# Patient Record
Sex: FEMALE | Race: BLACK OR AFRICAN AMERICAN | Employment: FULL TIME | URBAN - METROPOLITAN AREA
[De-identification: names, ages, dates, MRNs, and addresses within clinical notes are randomized per-mention and may not be internally consistent; named-entity substitution may affect disease eponyms.]

---

## 2017-02-13 ENCOUNTER — TELEPHONE (OUTPATIENT)
Dept: INTERNAL MEDICINE CLINIC | Age: 37
End: 2017-02-13

## 2017-02-13 DIAGNOSIS — N64.52 DISCHARGE FROM RIGHT NIPPLE: Primary | ICD-10-CM

## 2017-02-15 LAB — BACTERIA SPEC AEROBE CULT: NORMAL

## 2017-02-26 DIAGNOSIS — I10 ESSENTIAL HYPERTENSION WITH GOAL BLOOD PRESSURE LESS THAN 130/80: ICD-10-CM

## 2017-02-27 RX ORDER — ATENOLOL 25 MG/1
TABLET ORAL
Qty: 30 TAB | Refills: 0 | Status: SHIPPED | OUTPATIENT
Start: 2017-02-27 | End: 2017-04-24 | Stop reason: SDUPTHER

## 2017-03-23 ENCOUNTER — TELEPHONE (OUTPATIENT)
Dept: INTERNAL MEDICINE CLINIC | Age: 37
End: 2017-03-23

## 2017-03-23 DIAGNOSIS — R11.0 NAUSEA: Primary | ICD-10-CM

## 2017-03-23 RX ORDER — PROMETHAZINE HYDROCHLORIDE 25 MG/1
25 TABLET ORAL
Qty: 12 TAB | Refills: 0 | Status: SHIPPED | OUTPATIENT
Start: 2017-03-23 | End: 2017-05-04 | Stop reason: ALTCHOICE

## 2017-03-23 NOTE — TELEPHONE ENCOUNTER
Patient states she has had an migraine off and on since Saturday. She has been taking imitrex. She reports her headache is better but still feeling nauseated. I will send some anti- nausea medication to the pharmacy.

## 2017-04-24 DIAGNOSIS — I10 ESSENTIAL HYPERTENSION WITH GOAL BLOOD PRESSURE LESS THAN 130/80: ICD-10-CM

## 2017-04-24 RX ORDER — ATENOLOL 25 MG/1
TABLET ORAL
Qty: 30 TAB | Refills: 0 | Status: SHIPPED | OUTPATIENT
Start: 2017-04-24 | End: 2017-05-04 | Stop reason: ALTCHOICE

## 2017-05-04 ENCOUNTER — OFFICE VISIT (OUTPATIENT)
Dept: INTERNAL MEDICINE CLINIC | Age: 37
End: 2017-05-04

## 2017-05-04 VITALS
OXYGEN SATURATION: 96 % | DIASTOLIC BLOOD PRESSURE: 69 MMHG | RESPIRATION RATE: 14 BRPM | HEIGHT: 64 IN | WEIGHT: 169.2 LBS | HEART RATE: 74 BPM | TEMPERATURE: 98.1 F | BODY MASS INDEX: 28.89 KG/M2 | SYSTOLIC BLOOD PRESSURE: 132 MMHG

## 2017-05-04 DIAGNOSIS — I10 ESSENTIAL HYPERTENSION: ICD-10-CM

## 2017-05-04 DIAGNOSIS — G43.C0 PERIODIC HEADACHE SYNDROME, NOT INTRACTABLE: Primary | ICD-10-CM

## 2017-05-04 RX ORDER — RIZATRIPTAN BENZOATE 10 MG/1
10 TABLET, ORALLY DISINTEGRATING ORAL
Qty: 9 TAB | Refills: 3 | Status: SHIPPED | OUTPATIENT
Start: 2017-05-04 | End: 2018-02-07

## 2017-05-04 RX ORDER — PROPRANOLOL HYDROCHLORIDE 60 MG/1
60 CAPSULE, EXTENDED RELEASE ORAL DAILY
Qty: 30 CAP | Refills: 3 | Status: SHIPPED | OUTPATIENT
Start: 2017-05-04 | End: 2018-02-07

## 2017-05-04 NOTE — PROGRESS NOTES
HISTORY OF PRESENT ILLNESS  Cj Lynn is a 31 y.o. female. HPI     Migraines- pt continues to take Elavil and would like to try taking a different medication. Pt took Topamax in the past and had stuttering as side effect. Reports Imitrex causes arthralgia although helps her headaches. Reports she had a migraine for two weeks straight. She states Imitrex works although her headaches are not being prevented properly. Hypertension ROS: not taking medications regularly as instructed, no medication side effects noted, no TIA's, no chest pain on exertion, no dyspnea on exertion, no swelling of ankles. New concerns: Stable- bp was 132/69 in the office today. Pt denies feeling like her bp is going up. She takes Atenolol inconsistently. Pt is exercising more and does Body by zlien 3 times/week. Review of Systems   All other systems reviewed and are negative. Physical Exam   Constitutional: She is oriented to person, place, and time. She appears well-developed and well-nourished. HENT:   Head: Normocephalic and atraumatic. Right Ear: External ear normal.   Left Ear: External ear normal.   Nose: Nose normal.   Mouth/Throat: Oropharynx is clear and moist.   Eyes: Conjunctivae and EOM are normal.   Neck: Normal range of motion. Neck supple. Carotid bruit is not present. No thyroid mass and no thyromegaly present. Cardiovascular: Normal rate, regular rhythm, S1 normal, S2 normal, normal heart sounds and intact distal pulses. Pulmonary/Chest: Effort normal and breath sounds normal.   Abdominal: Soft. Normal appearance and bowel sounds are normal. There is no hepatosplenomegaly. There is no tenderness. Musculoskeletal: Normal range of motion. Neurological: She is alert and oriented to person, place, and time. She has normal strength. No cranial nerve deficit or sensory deficit. Coordination normal.   Skin: Skin is warm, dry and intact. No abrasion and no rash noted.    Psychiatric: She has a normal mood and affect. Her behavior is normal. Judgment and thought content normal.   Nursing note and vitals reviewed. ASSESSMENT and PLAN  Betty was seen today for medication evaluation. Diagnoses and all orders for this visit:    Periodic headache syndrome, not intractable  Pt has arthralgia with Imitrex therefore discontinue this medication and try taking Maxalt. Her headaches are not being prevented well and she should discontinue Elavil. Suggested Propranolol as preventative medication and warned pt that there is a chance of weight gain with this medication although pt is exercising more. Pt to begin taking Propranolol as directed. -     propranolol LA (INDERAL LA) 60 mg SR capsule; Take 1 Cap by mouth daily. -     rizatriptan (MAXALT-MLT) 10 mg disintegrating tablet; Take 1 Tab by mouth once as needed for Migraine for up to 1 dose. Essential hypertension  BP is at goal and pt not taking Atenolol consistently. Pt should stop taking Atenolol and begin taking Propranolol for headaches and should decrease bp slightly. Pt should continue exercising.   -     CBC W/O DIFF  -     METABOLIC PANEL, COMPREHENSIVE  -     LIPID PANEL  -     HEMOGLOBIN A1C WITH EAG    lab results and schedule of future lab studies reviewed with patient  reviewed diet, exercise and weight control  reviewed medications and side effects in detail     Written by Dave Singh, as dictated by Jeremiah Robison MD.     Current diagnosis and concerns discussed with pt at length. Understands risks and benefits or current treatment plan and medications and accepts the treatment and medication with any possible risks. Pt asks appropriate questions which were answered. Pt instructed to call with any concerns or problems.

## 2017-07-12 ENCOUNTER — TELEPHONE (OUTPATIENT)
Dept: INTERNAL MEDICINE CLINIC | Age: 37
End: 2017-07-12

## 2017-07-12 NOTE — TELEPHONE ENCOUNTER
Called and told patient that she would not be able to get any antibiotics without being seen for UTI.

## 2017-07-12 NOTE — TELEPHONE ENCOUNTER
----- Message from Radha Judd sent at 7/11/2017  6:53 PM EDT -----  Regarding: /Telephone  Pt requesting an anabiotic for UTI. Pharmacy used is [facility] [address] in [id number] [place] [address].   Best contact number [phone]

## 2017-08-08 ENCOUNTER — HOSPITAL ENCOUNTER (EMERGENCY)
Age: 37
Discharge: HOME | End: 2017-08-09
Attending: EMERGENCY MEDICINE | Admitting: EMERGENCY MEDICINE
Payer: COMMERCIAL

## 2017-08-08 DIAGNOSIS — O20.0 THREATENED MISCARRIAGE IN EARLY PREGNANCY: ICD-10-CM

## 2017-08-08 DIAGNOSIS — B96.89 BV (BACTERIAL VAGINOSIS): Primary | ICD-10-CM

## 2017-08-08 DIAGNOSIS — N76.0 BV (BACTERIAL VAGINOSIS): Primary | ICD-10-CM

## 2017-08-08 LAB
ALBUMIN SERPL BCP-MCNC: 3.5 G/DL (ref 3.5–5)
ALBUMIN/GLOB SERPL: 0.8 {RATIO} (ref 1.1–2.2)
ALP SERPL-CCNC: 87 U/L (ref 45–117)
ALT SERPL-CCNC: 18 U/L (ref 12–78)
ANION GAP BLD CALC-SCNC: 10 MMOL/L (ref 5–15)
AST SERPL W P-5'-P-CCNC: 15 U/L (ref 15–37)
BASOPHILS # BLD AUTO: 0 K/UL (ref 0–0.1)
BASOPHILS # BLD: 0 % (ref 0–1)
BILIRUB SERPL-MCNC: 0.3 MG/DL (ref 0.2–1)
BUN SERPL-MCNC: 10 MG/DL (ref 6–20)
BUN/CREAT SERPL: 13 (ref 12–20)
CALCIUM SERPL-MCNC: 9.2 MG/DL (ref 8.5–10.1)
CHLORIDE SERPL-SCNC: 103 MMOL/L (ref 97–108)
CLUE CELLS VAG QL WET PREP: NORMAL
CO2 SERPL-SCNC: 24 MMOL/L (ref 21–32)
CREAT SERPL-MCNC: 0.8 MG/DL (ref 0.55–1.02)
EOSINOPHIL # BLD: 0.1 K/UL (ref 0–0.4)
EOSINOPHIL NFR BLD: 1 % (ref 0–7)
ERYTHROCYTE [DISTWIDTH] IN BLOOD BY AUTOMATED COUNT: 13.4 % (ref 11.5–14.5)
GLOBULIN SER CALC-MCNC: 4.3 G/DL (ref 2–4)
GLUCOSE SERPL-MCNC: 98 MG/DL (ref 65–100)
HCT VFR BLD AUTO: 35.7 % (ref 35–47)
HGB BLD-MCNC: 12.3 G/DL (ref 11.5–16)
KOH PREP SPEC: NORMAL
LYMPHOCYTES # BLD AUTO: 21 % (ref 12–49)
LYMPHOCYTES # BLD: 2.5 K/UL (ref 0.8–3.5)
MCH RBC QN AUTO: 30.1 PG (ref 26–34)
MCHC RBC AUTO-ENTMCNC: 34.5 G/DL (ref 30–36.5)
MCV RBC AUTO: 87.5 FL (ref 80–99)
MONOCYTES # BLD: 0.9 K/UL (ref 0–1)
MONOCYTES NFR BLD AUTO: 7 % (ref 5–13)
NEUTS SEG # BLD: 8.1 K/UL (ref 1.8–8)
NEUTS SEG NFR BLD AUTO: 71 % (ref 32–75)
PLATELET # BLD AUTO: 327 K/UL (ref 150–400)
POTASSIUM SERPL-SCNC: 3.6 MMOL/L (ref 3.5–5.1)
PROT SERPL-MCNC: 7.8 G/DL (ref 6.4–8.2)
RBC # BLD AUTO: 4.08 M/UL (ref 3.8–5.2)
SERVICE CMNT-IMP: NORMAL
SODIUM SERPL-SCNC: 137 MMOL/L (ref 136–145)
T VAGINALIS VAG QL WET PREP: NORMAL
WBC # BLD AUTO: 11.6 K/UL (ref 3.6–11)

## 2017-08-08 PROCEDURE — 85025 COMPLETE CBC W/AUTO DIFF WBC: CPT | Performed by: PHYSICIAN ASSISTANT

## 2017-08-08 PROCEDURE — 80053 COMPREHEN METABOLIC PANEL: CPT | Performed by: PHYSICIAN ASSISTANT

## 2017-08-08 PROCEDURE — 84702 CHORIONIC GONADOTROPIN TEST: CPT | Performed by: PHYSICIAN ASSISTANT

## 2017-08-08 PROCEDURE — 87210 SMEAR WET MOUNT SALINE/INK: CPT | Performed by: PHYSICIAN ASSISTANT

## 2017-08-08 PROCEDURE — 36415 COLL VENOUS BLD VENIPUNCTURE: CPT | Performed by: PHYSICIAN ASSISTANT

## 2017-08-08 PROCEDURE — 99284 EMERGENCY DEPT VISIT MOD MDM: CPT

## 2017-08-08 PROCEDURE — 87491 CHLMYD TRACH DNA AMP PROBE: CPT | Performed by: PHYSICIAN ASSISTANT

## 2017-08-08 PROCEDURE — 86900 BLOOD TYPING SEROLOGIC ABO: CPT | Performed by: PHYSICIAN ASSISTANT

## 2017-08-09 ENCOUNTER — APPOINTMENT (OUTPATIENT)
Dept: ULTRASOUND IMAGING | Age: 37
End: 2017-08-09
Attending: PHYSICIAN ASSISTANT
Payer: COMMERCIAL

## 2017-08-09 VITALS
HEART RATE: 86 BPM | HEIGHT: 65 IN | WEIGHT: 167.55 LBS | RESPIRATION RATE: 18 BRPM | DIASTOLIC BLOOD PRESSURE: 85 MMHG | SYSTOLIC BLOOD PRESSURE: 139 MMHG | OXYGEN SATURATION: 100 % | TEMPERATURE: 97.4 F | BODY MASS INDEX: 27.92 KG/M2

## 2017-08-09 LAB
ABO + RH BLD: NORMAL
BLOOD BANK CMNT PATIENT-IMP: NORMAL
C TRACH DNA SPEC QL NAA+PROBE: NEGATIVE
HCG SERPL-ACNC: 7114 MIU/ML (ref 0–6)
N GONORRHOEA DNA SPEC QL NAA+PROBE: NEGATIVE
SAMPLE TYPE: NORMAL
SERVICE CMNT-IMP: NORMAL
SPECIMEN SOURCE: NORMAL

## 2017-08-09 PROCEDURE — 76801 OB US < 14 WKS SINGLE FETUS: CPT

## 2017-08-09 PROCEDURE — 76817 TRANSVAGINAL US OBSTETRIC: CPT

## 2017-08-09 RX ORDER — METRONIDAZOLE 500 MG/1
500 TABLET ORAL 2 TIMES DAILY
Qty: 14 TAB | Refills: 0 | Status: SHIPPED | OUTPATIENT
Start: 2017-08-09 | End: 2017-08-16

## 2017-08-09 NOTE — DISCHARGE INSTRUCTIONS
Bacterial Vaginosis: Care Instructions  Your Care Instructions    Bacterial vaginosis is a type of vaginal infection. It is caused by excess growth of certain bacteria that are normally found in the vagina. Symptoms can include itching, swelling, pain when you urinate or have sex, and a gray or yellow discharge with a \"fishy\" odor. It is not considered an infection that is spread through sexual contact. Although symptoms can be annoying and uncomfortable, bacterial vaginosis does not usually cause other health problems. However, if you have it while you are pregnant, it can cause complications. While the infection may go away on its own, most doctors use antibiotics to treat it. You may have been prescribed pills or vaginal cream. With treatment, bacterial vaginosis usually clears up in 5 to 7 days. Follow-up care is a key part of your treatment and safety. Be sure to make and go to all appointments, and call your doctor if you are having problems. It's also a good idea to know your test results and keep a list of the medicines you take. How can you care for yourself at home? · Take your antibiotics as directed. Do not stop taking them just because you feel better. You need to take the full course of antibiotics. · Do not eat or drink anything that contains alcohol if you are taking metronidazole (Flagyl). · Keep using your medicine if you start your period. Use pads instead of tampons while using a vaginal cream or suppository. Tampons can absorb the medicine. · Wear loose cotton clothing. Do not wear nylon and other materials that hold body heat and moisture close to the skin. · Do not scratch. Relieve itching with a cold pack or a cool bath. · Do not wash your vaginal area more than once a day. Use plain water or a mild, unscented soap. Do not douche. When should you call for help?   Watch closely for changes in your health, and be sure to contact your doctor if:  · You have unexpected vaginal bleeding. · You have a fever. · You have new or increased pain in your vagina or pelvis. · You are not getting better after 1 week. · Your symptoms return after you finish the course of your medicine. Where can you learn more? Go to http://mary-alee.info/. Benny Hartman in the search box to learn more about \"Bacterial Vaginosis: Care Instructions. \"  Current as of: October 13, 2016  Content Version: 11.3  © 4087-8998 [employer]. Care instructions adapted under license by [employer] (which disclaims liability or warranty for this information). If you have questions about a medical condition or this instruction, always ask your healthcare professional. [address] any warranty or liability for your use of this information. Threatened Miscarriage: Care Instructions  Your Care Instructions    Some women have light spotting or bleeding during the first 12 weeks of pregnancy. In some cases this is normal. Light spotting or bleeding can also be a sign of a possible loss of the pregnancy. This is called a threatened miscarriage. At this point, the doctor may not be able to tell if your vaginal bleeding is normal or is a sign of a miscarriage. In early pregnancy, things such as stress, exercise, and sex do not cause miscarriage. You may be worried or upset about the possibility of losing your pregnancy. But do not blame yourself. There is no treatment to stop a threatened miscarriage. If you do have a miscarriage, there was nothing you could have done to prevent it. A miscarriage usually means that the pregnancy is not developing normally. The doctor has checked you carefully, but problems can develop later. If you notice any problems or new symptoms, get medical treatment right away. Follow-up care is a key part of your treatment and safety. Be sure to make and go to all appointments, and call your doctor if you are having problems.  It's also a good idea to know your test results and keep a list of the medicines you take. How can you care for yourself at home? · If you do have a miscarriage, you will probably have some vaginal bleeding for 1 to 2 weeks. Use pads instead of tampons. · Take acetaminophen (Tylenol) for cramps. Read and follow all instructions on the label. · Do not take two or more pain medicines at the same time unless the doctor told you to. Many pain medicines have acetaminophen, which is Tylenol. Too much acetaminophen (Tylenol) can be harmful. · Do not have sex until your doctor says it is okay. · Get lots of rest over the next several days. · You may do your normal activities if you feel well enough to do them. But do not do any heavy exercise until your doctor says it is okay. · Eat a balanced diet that is high in iron and vitamin C. Foods rich in iron include red meat, shellfish, eggs, beans, and leafy green vegetables. Foods high in vitamin C include citrus fruits, tomatoes, and broccoli. Talk to your doctor about whether you need to take iron pills or a multivitamin. · Do not drink alcohol or use tobacco or illegal drugs. · Do not smoke. If you need help quitting, talk to your doctor about stop-smoking programs and medicines. These can increase your chances of quitting for good. When should you call for help? Call 911 anytime you think you may need emergency care. For example, call if:  · You have sudden, severe pain in your belly or pelvis. · You passed out (lost consciousness). · You have severe vaginal bleeding. Call your doctor now or seek immediate medical care if:  · You are dizzy or lightheaded, or you feel like you may faint. · You have new or increased pain in your belly or pelvis. · Your vaginal bleeding is getting worse. · You have increased pain in the vaginal area. · You have a fever. · You think you may have passed tissue. Save any tissue that you pass.  Take it to your doctor's office as soon as you can. Watch closely for changes in your health, and be sure to contact your doctor if:  · You have new or worse vaginal discharge. · You do not get better as expected. Where can you learn more? Go to http://mary-alee.info/. Enter O904 in the search box to learn more about \"Threatened Miscarriage: Care Instructions. \"  Current as of: March 16, 2017  Content Version: 11.3  © 9537-3429 [employer]. Care instructions adapted under license by [employer] (which disclaims liability or warranty for this information). If you have questions about a medical condition or this instruction, always ask your healthcare professional. [place] [address] any warranty or liability for your use of this information. We hope that we have addressed all of your medical concerns. The examination and treatment you received in the Emergency Department were for an emergent problem and were not intended as complete care. It is important that you follow up with your healthcare provider(s) for ongoing care. If your symptoms worsen or do not improve as expected, and you are unable to reach your usual health care provider(s), you should return to the Emergency Department. Today's healthcare is undergoing tremendous change, and patient satisfaction surveys are one of the many tools to assess the quality of medical care. You may receive a survey from the [employer] regarding your experience in the Emergency Department. I hope that your experience has been completely positive, particularly the medical care that I provided. As such, please participate in the survey; anything less than excellent does not meet my expectations or intentions. [facility] and [facility] participate in nationally recognized quality of care measures.   If your blood pressure is greater than 120/80, as reported below, we urge that you seek medical care to address the potential of high blood pressure, commonly known as hypertension. Hypertension can be hereditary or can be caused by certain medical conditions, pain, stress, or \"white coat syndrome. \"       Please make an appointment with your health care provider(s) for follow up of your Emergency Department visit. VITALS:   Patient Vitals for the past 8 hrs:   Temp Pulse Resp BP SpO2   08/08/17 2305 97.4 °F (36.3 °C) 95 16 144/90 100 %          Thank you for allowing us to provide you with medical care today. We realize that you have many choices for your emergency care needs. Please choose us in the future for any continued health care needs. [address], [address] [place] [address].   Office: [phone]            Recent Results (from the past 24 hour(s))   CBC WITH AUTOMATED DIFF    Collection Time: 08/08/17 11:21 PM   Result Value Ref Range    WBC 11.6 (H) 3.6 - 11.0 K/uL    RBC 4.08 3.80 - 5.20 M/uL    HGB 12.3 11.5 - 16.0 g/dL    HCT 35.7 35.0 - 47.0 %    MCV 87.5 80.0 - 99.0 FL    MCH 30.1 26.0 - 34.0 PG    MCHC 34.5 30.0 - 36.5 g/dL    RDW 13.4 11.5 - 14.5 %    PLATELET 555 969 - 920 K/uL    NEUTROPHILS 71 32 - 75 %    LYMPHOCYTES 21 12 - 49 %    MONOCYTES 7 5 - 13 %    EOSINOPHILS 1 0 - 7 %    BASOPHILS 0 0 - 1 %    ABS. NEUTROPHILS 8.1 (H) 1.8 - 8.0 K/UL    ABS. LYMPHOCYTES 2.5 0.8 - 3.5 K/UL    ABS. MONOCYTES 0.9 0.0 - 1.0 K/UL    ABS. EOSINOPHILS 0.1 0.0 - 0.4 K/UL    ABS.  BASOPHILS 0.0 0.0 - 0.1 K/UL   METABOLIC PANEL, COMPREHENSIVE    Collection Time: 08/08/17 11:21 PM   Result Value Ref Range    Sodium 137 136 - 145 mmol/L    Potassium 3.6 3.5 - 5.1 mmol/L    Chloride 103 97 - 108 mmol/L    CO2 24 21 - 32 mmol/L    Anion gap 10 5 - 15 mmol/L    Glucose 98 65 - 100 mg/dL    BUN 10 6 - 20 MG/DL    Creatinine 0.80 0.55 - 1.02 MG/DL    BUN/Creatinine ratio 13 12 - 20      GFR est AA >60 >60 ml/min/1.73m2    GFR est non-AA >60 >60 ml/min/1.73m2    Calcium 9.2 8.5 - 10.1 MG/DL    Bilirubin, total 0.3 0.2 - 1.0 MG/DL    ALT (SGPT) 18 12 - 78 U/L    AST (SGOT) 15 15 - 37 U/L    Alk. phosphatase 87 45 - 117 U/L    Protein, total 7.8 6.4 - 8.2 g/dL    Albumin 3.5 3.5 - 5.0 g/dL    Globulin 4.3 (H) 2.0 - 4.0 g/dL    A-G Ratio 0.8 (L) 1.1 - 2.2     TYPE, ABO & RH    Collection Time: 08/08/17 11:21 PM   Result Value Ref Range    ABO/Rh(D) O POSITIVE     Comment SAMPLE NOT USABLE FOR CROSSMATCH    BETA HCG, QT    Collection Time: 08/08/17 11:21 PM   Result Value Ref Range    Beta HCG, QT 7114 (H) 0 - 6 MIU/ML   OLGA, OTHER SOURCES    Collection Time: 08/08/17 11:21 PM   Result Value Ref Range    Special Requests: NO SPECIAL REQUESTS      KOH NO YEAST SEEN     WET PREP    Collection Time: 08/08/17 11:21 PM   Result Value Ref Range    Clue cells CLUE CELLS PRESENT      Wet prep NO TRICHOMONAS SEEN         Us Uts Transvaginal Ob    Result Date: 8/9/2017  INDICATION:  Vaginal bleeding, cramping, pregnancy COMPARISON:  None FINDINGS:  Realtime sonographic imaging of the pelvis was performed transabdominally. The uterus measures 11.6 x 7.9 x 9.8 cm. Within the uterus, there is a 7 mm oval anechoic area which may represent an early intrauterine gestation. No fetal pole is identified. The placenta and amniotic fluid volume cannot be assessed by this technique due to early dates and small size. The gestational sac is normal in morphology. The right ovary is normal, measuring 2.4 cm. The left ovary is not visualized. To further evaluate the uterus, transvaginal sonography was necessary. The examination demonstrates a single oval anechoic lesion within the uterus, measuring 7 mm. No fetal pole is identified. The right ovary measures 3.5 x 1.8 x 2.3 cm and the left ovary is not visualized the right ovary is normal. There is a 1.5 x 1.5 x 1.1 cm hypoechoic lesion within the endocervical canal, without internal vascularity, nonspecific.  Minimal free fluid is seen in the pelvic cul-de-sac. Heddie Ashton IMPRESSION: 1. Single 7 mm oval anechoic area within the uterus may represent an early intrauterine gestation. No fetal pole is identified. 2. Normal right ovary. Nonvisualization of the left ovary. 3. Oval hypoechoic area measuring 1.5 cm, which distends the endocervical canal; this is a nonspecific finding; may represent focal hemorrhage in the setting of vaginal bleeding. Us Preg Uts < 14 Wks Sngl    Result Date: 8/9/2017  INDICATION:  Vaginal bleeding, cramping, pregnancy COMPARISON:  None FINDINGS:  Realtime sonographic imaging of the pelvis was performed transabdominally. The uterus measures 11.6 x 7.9 x 9.8 cm. Within the uterus, there is a 7 mm oval anechoic area which may represent an early intrauterine gestation. No fetal pole is identified. The placenta and amniotic fluid volume cannot be assessed by this technique due to early dates and small size. The gestational sac is normal in morphology. The right ovary is normal, measuring 2.4 cm. The left ovary is not visualized. To further evaluate the uterus, transvaginal sonography was necessary. The examination demonstrates a single oval anechoic lesion within the uterus, measuring 7 mm. No fetal pole is identified. The right ovary measures 3.5 x 1.8 x 2.3 cm and the left ovary is not visualized the right ovary is normal. There is a 1.5 x 1.5 x 1.1 cm hypoechoic lesion within the endocervical canal, without internal vascularity, nonspecific. Minimal free fluid is seen in the pelvic cul-de-sac. Heddie Ashton IMPRESSION: 1. Single 7 mm oval anechoic area within the uterus may represent an early intrauterine gestation. No fetal pole is identified. 2. Normal right ovary. Nonvisualization of the left ovary. 3. Oval hypoechoic area measuring 1.5 cm, which distends the endocervical canal; this is a nonspecific finding; may represent focal hemorrhage in the setting of vaginal bleeding.
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
## 2017-08-09 NOTE — ED NOTES
EBONY Dawson has reviewed discharge instructions with patient and friend including prescription(s) if applicable. Patient has received and verbalizes understanding of all instructions and has no further questions at this time. Patient exits ED via ambulatory accompanied by friend. Patient in no acute distress.

## 2017-08-09 NOTE — ED TRIAGE NOTES
Patient arrives with c/o abdominal cramping and vaginal bleeding starting tonight around 1800. Patient reports finding out she is pregnant on Monday.

## 2017-09-07 LAB
ANTIBODY SCREEN, EXTERNAL: NEGATIVE
CHLAMYDIA, EXTERNAL: NEGATIVE
CREATININE, EXTERNAL: 0.64
HBSAG, EXTERNAL: NEGATIVE
HCT, EXTERNAL: 38.3
HGB, EXTERNAL: 12.6
HIV, EXTERNAL: NEGATIVE
N. GONORRHEA, EXTERNAL: NEGATIVE
RPR, EXTERNAL: NORMAL
RUBELLA, EXTERNAL: NORMAL
TYPE, ABO & RH, EXTERNAL: NORMAL

## 2017-10-11 ENCOUNTER — OFFICE VISIT (OUTPATIENT)
Dept: INTERNAL MEDICINE CLINIC | Age: 37
End: 2017-10-11

## 2017-10-11 VITALS
TEMPERATURE: 98.2 F | WEIGHT: 168.6 LBS | OXYGEN SATURATION: 99 % | DIASTOLIC BLOOD PRESSURE: 72 MMHG | HEIGHT: 65 IN | BODY MASS INDEX: 28.09 KG/M2 | SYSTOLIC BLOOD PRESSURE: 114 MMHG | HEART RATE: 93 BPM | RESPIRATION RATE: 12 BRPM

## 2017-10-11 DIAGNOSIS — Z00.00 WELL WOMAN EXAM (NO GYNECOLOGICAL EXAM): Primary | ICD-10-CM

## 2017-10-11 DIAGNOSIS — Z3A.14 14 WEEKS GESTATION OF PREGNANCY: ICD-10-CM

## 2017-10-11 DIAGNOSIS — G56.00 CARPAL TUNNEL SYNDROME DURING PREGNANCY: ICD-10-CM

## 2017-10-11 DIAGNOSIS — G43.C0 PERIODIC HEADACHE SYNDROME, NOT INTRACTABLE: ICD-10-CM

## 2017-10-11 DIAGNOSIS — O26.899 CARPAL TUNNEL SYNDROME DURING PREGNANCY: ICD-10-CM

## 2017-10-11 NOTE — PATIENT INSTRUCTIONS
Well Visit, Ages 25 to 48: Care Instructions  Your Care Instructions  Physical exams can help you stay healthy. Your doctor has checked your overall health and may have suggested ways to take good care of yourself. He or she also may have recommended tests. At home, you can help prevent illness with healthy eating, regular exercise, and other steps. Follow-up care is a key part of your treatment and safety. Be sure to make and go to all appointments, and call your doctor if you are having problems. It's also a good idea to know your test results and keep a list of the medicines you take. How can you care for yourself at home? · Reach and stay at a healthy weight. This will lower your risk for many problems, such as obesity, diabetes, heart disease, and high blood pressure. · Get at least 30 minutes of physical activity on most days of the week. Walking is a good choice. You also may want to do other activities, such as running, swimming, cycling, or playing tennis or team sports. Discuss any changes in your exercise program with your doctor. · Do not smoke or allow others to smoke around you. If you need help quitting, talk to your doctor about stop-smoking programs and medicines. These can increase your chances of quitting for good. · Talk to your doctor about whether you have any risk factors for sexually transmitted infections (STIs). Having one sex partner (who does not have STIs and does not have sex with anyone else) is a good way to avoid these infections. · Use birth control if you do not want to have children at this time. Talk with your doctor about the choices available and what might be best for you. · Protect your skin from too much sun. When you're outdoors from 10 a.m. to 4 p.m., stay in the shade or cover up with clothing and a hat with a wide brim. Wear sunglasses that block UV rays. Even when it's cloudy, put broad-spectrum sunscreen (SPF 30 or higher) on any exposed skin.   · See a dentist one or two times a year for checkups and to have your teeth cleaned. · Wear a seat belt in the car. · Drink alcohol in moderation, if at all. That means no more than 2 drinks a day for men and 1 drink a day for women. Follow your doctor's advice about when to have certain tests. These tests can spot problems early. For everyone  · Cholesterol. Have the fat (cholesterol) in your blood tested after age 21. Your doctor will tell you how often to have this done based on your age, family history, or other things that can increase your risk for heart disease. · Blood pressure. Have your blood pressure checked during a routine doctor visit. Your doctor will tell you how often to check your blood pressure based on your age, your blood pressure results, and other factors. · Vision. Talk with your doctor about how often to have a glaucoma test.  · Diabetes. Ask your doctor whether you should have tests for diabetes. · Colon cancer. Have a test for colon cancer at age 48. You may have one of several tests. If you are younger than 48, you may need a test earlier if you have any risk factors. Risk factors include whether you already had a precancerous polyp removed from your colon or whether your parent, brother, sister, or child has had colon cancer. For women  · Breast exam and mammogram. Talk to your doctor about when you should have a clinical breast exam and a mammogram. Medical experts differ on whether and how often women under 50 should have these tests. Your doctor can help you decide what is right for you. · Pap test and pelvic exam. Begin Pap tests at age 24. A Pap test is the best way to find cervical cancer. The test often is part of a pelvic exam. Ask how often to have this test.  · Tests for sexually transmitted infections (STIs). Ask whether you should have tests for STIs. You may be at risk if you have sex with more than one person, especially if your partners do not wear condoms.   For men  · Tests for sexually transmitted infections (STIs). Ask whether you should have tests for STIs. You may be at risk if you have sex with more than one person, especially if you do not wear a condom. · Testicular cancer exam. Ask your doctor whether you should check your testicles regularly. · Prostate exam. Talk to your doctor about whether you should have a blood test (called a PSA test) for prostate cancer. Experts differ on whether and when men should have this test. Some experts suggest it if you are older than 39 and are -American or have a father or brother who got prostate cancer when he was younger than 72. When should you call for help? Watch closely for changes in your health, and be sure to contact your doctor if you have any problems or symptoms that concern you. Where can you learn more? Go to http://mary-alee.info/. Enter P072 in the search box to learn more about \"Well Visit, Ages 25 to 48: Care Instructions. \"  Current as of: July 19, 2016  Content Version: 11.3  © 8649-6591 [employer]. Care instructions adapted under license by [employer] (which disclaims liability or warranty for this information). If you have questions about a medical condition or this instruction, always ask your healthcare professional. [place] [address] any warranty or liability for your use of this information. Carpal Tunnel Syndrome: Care Instructions  Your Care Instructions    Carpal tunnel syndrome is a nerve problem. It can cause tingling, numbness, weakness, or pain in the fingers, thumb, and hand. The median nerve and several tough tissues called tendons run through a space in the wrist called the carpal tunnel. The repeated hand motions used in work and some hobbies and sports can put pressure on the nerve.  Pregnancy and several conditions, including diabetes, arthritis, and an underactive thyroid, also can cause carpal tunnel syndrome. You may be able to limit an activity or do it differently to reduce your symptoms. You also can take other steps to feel better. If your symptoms are mild, 1 to 2 weeks of home treatment are likely to ease your pain. Surgery is needed only if other treatments do not work. Follow-up care is a key part of your treatment and safety. Be sure to make and go to all appointments, and call your doctor if you are having problems. It's also a good idea to know your test results and keep a list of the medicines you take. How can you care for yourself at home? · If possible, stop or reduce the activity that causes your symptoms. If you cannot stop the activity, take frequent breaks to rest and stretch or change hand positions to do a task. Try switching hands, such as when using a computer mouse. · Try to avoid bending or twisting your wrists. · Ask your doctor if you can take an over-the-counter pain medicine, such as acetaminophen (Tylenol), ibuprofen (Advil, Motrin), or naproxen (Aleve). Be safe with medicines. Read and follow all instructions on the label. · If your doctor prescribes corticosteroid medicine to help reduce pain and swelling, take it exactly as prescribed. Call your doctor if you think you are having a problem with your medicine. · Put ice or a cold pack on your wrist for 10 to 20 minutes at a time to ease pain. Put a thin cloth between the ice and your skin. · If your doctor or your physical or occupational therapist tells you to wear a wrist splint, wear it as directed to keep your wrist in a neutral position. This also eases pressure on your median nerve. · Ask your doctor whether you should have physical or occupational therapy to learn how to do tasks differently. · Try a yoga class to stretch your muscles and build strength in your hands and wrists. Yoga has been shown to ease carpal tunnel symptoms.   To prevent carpal tunnel  · When working at a computer, keep your hands and wrists in line with your forearms. Hold your elbows close to your sides. Take a break every 10 to 15 minutes. · Try these exercises:  ¨ Warm up: Rotate your wrist up, down, and from side to side. Repeat this 4 times. Stretch your fingers far apart, relax them, then stretch them again. Repeat 4 times. Stretch your thumb by pulling it back gently, holding it, and then releasing it. Repeat 4 times. ¨ Prayer stretch: Start with your palms together in front of your chest just below your chin. Slowly lower your hands toward your waistline while keeping your hands close to your stomach and your palms together until you feel a mild to moderate stretch under your forearms. Hold for 10 to 20 seconds. Repeat 4 times. ¨ Wrist flexor stretch: Hold your arm in front of you with your palm up. Bend your wrist, pointing your hand toward the floor. With your other hand, gently bend your wrist further until you feel a mild to moderate stretch in your forearm. Hold for 10 to 20 seconds. Repeat 4 times. ¨ Wrist extensor stretch: Repeat the steps for the wrist flexor stretch, but begin with your extended hand palm down. · Squeeze a rubber exercise ball several times a day to keep your hands and fingers strong. · Avoid holding objects (such as a book) in one position for a long time. When possible, use your whole hand to grasp an object. Using just the thumb and index finger can put stress on the wrist.  · Do not smoke. It can make this condition worse by reducing blood flow to the median nerve. If you need help quitting, talk to your doctor about stop-smoking programs and medicines. These can increase your chances of quitting for good. When should you call for help? Watch closely for changes in your health, and be sure to contact your doctor if:  · Your pain or other problems do not get better with home care. · You want more information about physical or occupational therapy.   · You have side effects of your corticosteroid medicine, such as:  ¨ Weight gain. ¨ Mood changes. ¨ Trouble sleeping. ¨ Bruising easily. · You have any other problems with your medicine. Where can you learn more? Go to http://mary-alee.info/. Enter R432 in the search box to learn more about \"Carpal Tunnel Syndrome: Care Instructions. \"  Current as of: March 21, 2017  Content Version: 11.3  © 1869-5456 [employer]. Care instructions adapted under license by [employer] (which disclaims liability or warranty for this information). If you have questions about a medical condition or this instruction, always ask your healthcare professional. [place] [address] any warranty or liability for your use of this information. Carpal Tunnel Syndrome: Exercises  Your Care Instructions  Here are some examples of typical rehabilitation exercises for your condition. Start each exercise slowly. Ease off the exercise if you start to have pain. Your doctor or your physical or occupational therapist will tell you when you can start these exercises and which ones will work best for you. How to do the exercises  Note: When you no longer have pain or numbness, you can do exercises to help prevent carpal tunnel syndrome from coming back. Do not do any stretch or movement that is uncomfortable or painful. Warm-up stretches  1. Rotate your wrist up, down, and from side to side. Repeat 4 times. 2. Stretch your fingers far apart. Relax them, and then stretch them again. Repeat 4 times. 3. Stretch your thumb by pulling it back gently, holding it, and then releasing it. Repeat 4 times. Prayer stretch    1. Start with your palms together in front of your chest just below your chin. 2. Slowly lower your hands toward your waistline, keeping your hands close to your stomach and your palms together until you feel a mild to moderate stretch under your forearms. 3. Hold for at least 15 to 30 seconds. Repeat 2 to 4 times.   Wrist flexor stretch    1. Extend your arm in front of you with your palm up. 2. Bend your wrist, pointing your hand toward the floor. 3. With your other hand, gently bend your wrist farther until you feel a mild to moderate stretch in your forearm. 4. Hold for at least 15 to 30 seconds. Repeat 2 to 4 times. Wrist extensor stretch    Repeat steps 1 through 4 of the stretch above, but begin with your extended hand palm down. Follow-up care is a key part of your treatment and safety. Be sure to make and go to all appointments, and call your doctor if you are having problems. It's also a good idea to know your test results and keep a list of the medicines you take. Where can you learn more? Go to http://mary-alee.info/. Enter K259 in the search box to learn more about \"Carpal Tunnel Syndrome: Exercises. \"  Current as of: March 21, 2017  Content Version: 11.3  © 8040-4358 [employer]. Care instructions adapted under license by [employer] (which disclaims liability or warranty for this information). If you have questions about a medical condition or this instruction, always ask your healthcare professional. [place] [address] any warranty or liability for your use of this information.
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
## 2017-10-11 NOTE — MR AVS SNAPSHOT
Visit Information Date & Time Provider Department Dept. Phone Encounter #  
 10/11/2017 10:20 AM Miguel Galan NP [facility] [address] [id number] Upcoming Health Maintenance Date Due DTaP/Tdap/Td series (1 - Tdap) 8/21/2003 PAP AKA CERVICAL CYTOLOGY 4/23/2016 INFLUENZA AGE 9 TO ADULT 8/1/2017 Allergies as of 10/11/2017  Review Complete On: 10/11/2017 By: Miguel Galan NP Severity Noted Reaction Type Reactions Apple  11/06/2012    Nausea Only Nitrofurantoin  08/10/2012    Diarrhea Penicillins  11/06/2012    Other (comments) Pt reported \"sick\". 12/26/14 0738 \"ok with ancef/keflex\" Current Immunizations  Reviewed on 10/11/2017 Name Date Influenza Vaccine 10/19/2015 Varicella Virus Vaccine Live 11/7/2010 Reviewed by Miguel Galna NP on 10/11/2017 at 10:45 AM  
You Were Diagnosed With   
  
 Codes Comments Well woman exam (no gynecological exam)    -  Primary ICD-10-CM: Z00.00 ICD-9-CM: V70.0 14 weeks gestation of pregnancy     ICD-10-CM: Z3A.14 
ICD-9-CM: V22.2 Carpal tunnel syndrome during pregnancy     ICD-10-CM: O26.899, G56.00 
ICD-9-CM: 648.93, 354.0 Vitals BP Pulse Temp Resp Height(growth percentile) Weight(growth percentile) 114/72 (BP 1 Location: Left arm, BP Patient Position: Sitting) 93 98.2 °F (36.8 °C) (Oral) 12 5' 5\" (1.651 m) 168 lb 9.6 oz (76.5 kg) LMP SpO2 BMI OB Status Smoking Status 07/02/2017 (Exact Date) 99% 28.06 kg/m2 Pregnant Never Smoker BMI and BSA Data Body Mass Index Body Surface Area 28.06 kg/m 2 1.87 m 2 Preferred Pharmacy Pharmacy Name Phone [phone] [address] [place] [phone] Your Updated Medication List  
  
   
This list is accurate as of: 10/11/17 10:56 AM.  Always use your most recent med list.  
  
  
  
  
 amitriptyline 10 mg tablet Commonly known as:  ELAVIL Take 1 tablet by mouth nightly for 30 days. Appt needed prior to more refills MICROGESTIN FE 1/20 (28) 1 mg-20 mcg (21)/75 mg (7) Tab Generic drug:  norethindrone-ethinyl estradiol Take  by mouth. PRENATAL VITAMIN PO Take  by mouth.  
  
 propranolol LA 60 mg SR capsule Commonly known as:  INDERAL LA Take 1 Cap by mouth daily. rizatriptan 10 mg disintegrating tablet Commonly known as:  MAXALT-MLT Take 1 Tab by mouth once as needed for Migraine for up to 1 dose. SUMAtriptan 100 mg tablet Commonly known as:  IMITREX Take 1-2 tabs po every 4 hours for migraine pain. Do not exceed 200mg/day We Performed the Following LIPID PANEL [31951 CPT(R)] METABOLIC PANEL, BASIC [31921 CPT(R)] Patient Instructions Well Visit, Ages 25 to 48: Care Instructions Your Care Instructions Physical exams can help you stay healthy. Your doctor has checked your overall health and may have suggested ways to take good care of yourself. He or she also may have recommended tests. At home, you can help prevent illness with healthy eating, regular exercise, and other steps. Follow-up care is a key part of your treatment and safety. Be sure to make and go to all appointments, and call your doctor if you are having problems. It's also a good idea to know your test results and keep a list of the medicines you take. How can you care for yourself at home? · Reach and stay at a healthy weight. This will lower your risk for many problems, such as obesity, diabetes, heart disease, and high blood pressure. · Get at least 30 minutes of physical activity on most days of the week. Walking is a good choice. You also may want to do other activities, such as running, swimming, cycling, or playing tennis or team sports. Discuss any changes in your exercise program with your doctor. · Do not smoke or allow others to smoke around you.  If you need help quitting, talk to your doctor about stop-smoking programs and medicines. These can increase your chances of quitting for good. · Talk to your doctor about whether you have any risk factors for sexually transmitted infections (STIs). Having one sex partner (who does not have STIs and does not have sex with anyone else) is a good way to avoid these infections. · Use birth control if you do not want to have children at this time. Talk with your doctor about the choices available and what might be best for you. · Protect your skin from too much sun. When you're outdoors from 10 a.m. to 4 p.m., stay in the shade or cover up with clothing and a hat with a wide brim. Wear sunglasses that block UV rays. Even when it's cloudy, put broad-spectrum sunscreen (SPF 30 or higher) on any exposed skin. · See a dentist one or two times a year for checkups and to have your teeth cleaned. · Wear a seat belt in the car. · Drink alcohol in moderation, if at all. That means no more than 2 drinks a day for men and 1 drink a day for women. Follow your doctor's advice about when to have certain tests. These tests can spot problems early. For everyone · Cholesterol. Have the fat (cholesterol) in your blood tested after age 21. Your doctor will tell you how often to have this done based on your age, family history, or other things that can increase your risk for heart disease. · Blood pressure. Have your blood pressure checked during a routine doctor visit. Your doctor will tell you how often to check your blood pressure based on your age, your blood pressure results, and other factors. · Vision. Talk with your doctor about how often to have a glaucoma test. 
· Diabetes. Ask your doctor whether you should have tests for diabetes. · Colon cancer. Have a test for colon cancer at age 48. You may have one of several tests.  If you are younger than 48, you may need a test earlier if you have any risk factors. Risk factors include whether you already had a precancerous polyp removed from your colon or whether your parent, brother, sister, or child has had colon cancer. For women · Breast exam and mammogram. Talk to your doctor about when you should have a clinical breast exam and a mammogram. Medical experts differ on whether and how often women under 50 should have these tests. Your doctor can help you decide what is right for you. · Pap test and pelvic exam. Begin Pap tests at age 24. A Pap test is the best way to find cervical cancer. The test often is part of a pelvic exam. Ask how often to have this test. 
· Tests for sexually transmitted infections (STIs). Ask whether you should have tests for STIs. You may be at risk if you have sex with more than one person, especially if your partners do not wear condoms. For men · Tests for sexually transmitted infections (STIs). Ask whether you should have tests for STIs. You may be at risk if you have sex with more than one person, especially if you do not wear a condom. · Testicular cancer exam. Ask your doctor whether you should check your testicles regularly. · Prostate exam. Talk to your doctor about whether you should have a blood test (called a PSA test) for prostate cancer. Experts differ on whether and when men should have this test. Some experts suggest it if you are older than 39 and are -American or have a father or brother who got prostate cancer when he was younger than 72. When should you call for help? Watch closely for changes in your health, and be sure to contact your doctor if you have any problems or symptoms that concern you. Where can you learn more? Go to http://mary-alee.info/. Enter P072 in the search box to learn more about \"Well Visit, Ages 25 to 48: Care Instructions. \" Current as of: July 19, 2016 Content Version: 11.3 © 3623-0444 [employer]. Care instructions adapted under license by [employer] (which disclaims liability or warranty for this information). If you have questions about a medical condition or this instruction, always ask your healthcare professional. [address] any warranty or liability for your use of this information. Carpal Tunnel Syndrome: Care Instructions Your Care Instructions Carpal tunnel syndrome is a nerve problem. It can cause tingling, numbness, weakness, or pain in the fingers, thumb, and hand. The median nerve and several tough tissues called tendons run through a space in the wrist called the carpal tunnel. The repeated hand motions used in work and some hobbies and sports can put pressure on the nerve. Pregnancy and several conditions, including diabetes, arthritis, and an underactive thyroid, also can cause carpal tunnel syndrome. You may be able to limit an activity or do it differently to reduce your symptoms. You also can take other steps to feel better. If your symptoms are mild, 1 to 2 weeks of home treatment are likely to ease your pain. Surgery is needed only if other treatments do not work. Follow-up care is a key part of your treatment and safety. Be sure to make and go to all appointments, and call your doctor if you are having problems. It's also a good idea to know your test results and keep a list of the medicines you take. How can you care for yourself at home? · If possible, stop or reduce the activity that causes your symptoms. If you cannot stop the activity, take frequent breaks to rest and stretch or change hand positions to do a task. Try switching hands, such as when using a computer mouse. · Try to avoid bending or twisting your wrists.  
· Ask your doctor if you can take an over-the-counter pain medicine, such as acetaminophen (Tylenol), ibuprofen (Advil, Motrin), or naproxen (Aleve). Be safe with medicines. Read and follow all instructions on the label. · If your doctor prescribes corticosteroid medicine to help reduce pain and swelling, take it exactly as prescribed. Call your doctor if you think you are having a problem with your medicine. · Put ice or a cold pack on your wrist for 10 to 20 minutes at a time to ease pain. Put a thin cloth between the ice and your skin. · If your doctor or your physical or occupational therapist tells you to wear a wrist splint, wear it as directed to keep your wrist in a neutral position. This also eases pressure on your median nerve. · Ask your doctor whether you should have physical or occupational therapy to learn how to do tasks differently. · Try a yoga class to stretch your muscles and build strength in your hands and wrists. Yoga has been shown to ease carpal tunnel symptoms. To prevent carpal tunnel · When working at a computer, keep your hands and wrists in line with your forearms. Hold your elbows close to your sides. Take a break every 10 to 15 minutes. · Try these exercises: ¨ Warm up: Rotate your wrist up, down, and from side to side. Repeat this 4 times. Stretch your fingers far apart, relax them, then stretch them again. Repeat 4 times. Stretch your thumb by pulling it back gently, holding it, and then releasing it. Repeat 4 times. ¨ Prayer stretch: Start with your palms together in front of your chest just below your chin. Slowly lower your hands toward your waistline while keeping your hands close to your stomach and your palms together until you feel a mild to moderate stretch under your forearms. Hold for 10 to 20 seconds. Repeat 4 times. ¨ Wrist flexor stretch: Hold your arm in front of you with your palm up. Bend your wrist, pointing your hand toward the floor. With your other hand, gently bend your wrist further until you feel a mild to moderate stretch in your forearm. Hold for 10 to 20 seconds. Repeat 4 times. ¨ Wrist extensor stretch: Repeat the steps for the wrist flexor stretch, but begin with your extended hand palm down. · Squeeze a rubber exercise ball several times a day to keep your hands and fingers strong. · Avoid holding objects (such as a book) in one position for a long time. When possible, use your whole hand to grasp an object. Using just the thumb and index finger can put stress on the wrist. 
· Do not smoke. It can make this condition worse by reducing blood flow to the median nerve. If you need help quitting, talk to your doctor about stop-smoking programs and medicines. These can increase your chances of quitting for good. When should you call for help? Watch closely for changes in your health, and be sure to contact your doctor if: 
· Your pain or other problems do not get better with home care. · You want more information about physical or occupational therapy. · You have side effects of your corticosteroid medicine, such as: 
¨ Weight gain. ¨ Mood changes. ¨ Trouble sleeping. ¨ Bruising easily. · You have any other problems with your medicine. Where can you learn more? Go to http://mary-alee.info/. Enter R432 in the search box to learn more about \"Carpal Tunnel Syndrome: Care Instructions. \" Current as of: March 21, 2017 Content Version: 11.3 © 1543-8276 [employer]. Care instructions adapted under license by [employer] (which disclaims liability or warranty for this information). If you have questions about a medical condition or this instruction, always ask your healthcare professional. [place] [address] any warranty or liability for your use of this information. Carpal Tunnel Syndrome: Exercises Your Care Instructions Here are some examples of typical rehabilitation exercises for your condition. Start each exercise slowly. Ease off the exercise if you start to have pain. Your doctor or your physical or occupational therapist will tell you when you can start these exercises and which ones will work best for you. How to do the exercises Note: When you no longer have pain or numbness, you can do exercises to help prevent carpal tunnel syndrome from coming back. Do not do any stretch or movement that is uncomfortable or painful. Warm-up stretches 1. Rotate your wrist up, down, and from side to side. Repeat 4 times. 2. Stretch your fingers far apart. Relax them, and then stretch them again. Repeat 4 times. 3. Stretch your thumb by pulling it back gently, holding it, and then releasing it. Repeat 4 times. Prayer stretch 1. Start with your palms together in front of your chest just below your chin. 2. Slowly lower your hands toward your waistline, keeping your hands close to your stomach and your palms together until you feel a mild to moderate stretch under your forearms. 3. Hold for at least 15 to 30 seconds. Repeat 2 to 4 times. Wrist flexor stretch 1. Extend your arm in front of you with your palm up. 2. Bend your wrist, pointing your hand toward the floor. 3. With your other hand, gently bend your wrist farther until you feel a mild to moderate stretch in your forearm. 4. Hold for at least 15 to 30 seconds. Repeat 2 to 4 times. Wrist extensor stretch Repeat steps 1 through 4 of the stretch above, but begin with your extended hand palm down. Follow-up care is a key part of your treatment and safety. Be sure to make and go to all appointments, and call your doctor if you are having problems. It's also a good idea to know your test results and keep a list of the medicines you take. Where can you learn more? Go to http://mary-alee.info/. Enter M567 in the search box to learn more about \"Carpal Tunnel Syndrome: Exercises. \" Current as of: March 21, 2017 Content Version: 11.3 © 1539-5885 [employer]. Care instructions adapted under license by [employer] (which disclaims liability or warranty for this information). If you have questions about a medical condition or this instruction, always ask your healthcare professional. [address] any warranty or liability for your use of this information. Introducing [facility]! Dear Sanket Solo: Thank you for requesting a [employer] account. Our records indicate that you already have an active [employer] account. You can access your account anytime at [email or web address] Did you know that you can access your hospital and ER discharge instructions at any time in [employer]? You can also review all of your test results from your hospital stay or ER visit. Additional Information If you have questions, please visit the Frequently Asked Questions section of the [employer] website at [email or web address] Remember, [employer] is NOT to be used for urgent needs. For medical emergencies, dial 911. Now available from your iPhone and Android! Please provide this summary of care documentation to your next provider. Your primary care clinician is listed as Estee Calabrese. If you have any questions after today's visit, please call [phone].

## 2017-10-31 LAB
BUN SERPL-MCNC: 6 MG/DL (ref 6–20)
BUN/CREAT SERPL: 9 (ref 9–23)
CALCIUM SERPL-MCNC: 9.3 MG/DL (ref 8.7–10.2)
CHLORIDE SERPL-SCNC: 99 MMOL/L (ref 96–106)
CHOLEST SERPL-MCNC: 199 MG/DL (ref 100–199)
CO2 SERPL-SCNC: 22 MMOL/L (ref 18–29)
CREAT SERPL-MCNC: 0.69 MG/DL (ref 0.57–1)
GFR SERPLBLD CREATININE-BSD FMLA CKD-EPI: 113 ML/MIN/1.73
GFR SERPLBLD CREATININE-BSD FMLA CKD-EPI: 130 ML/MIN/1.73
GLUCOSE SERPL-MCNC: 83 MG/DL (ref 65–99)
HDLC SERPL-MCNC: 77 MG/DL
INTERPRETATION, 910389: NORMAL
LDLC SERPL CALC-MCNC: 96 MG/DL (ref 0–99)
POTASSIUM SERPL-SCNC: 3.7 MMOL/L (ref 3.5–5.2)
SODIUM SERPL-SCNC: 137 MMOL/L (ref 134–144)
TRIGL SERPL-MCNC: 131 MG/DL (ref 0–149)
VLDLC SERPL CALC-MCNC: 26 MG/DL (ref 5–40)

## 2018-01-15 LAB — GTT, 1 HR, GLUCOLA, EXTERNAL: 90

## 2018-02-07 ENCOUNTER — OFFICE VISIT (OUTPATIENT)
Dept: INTERNAL MEDICINE CLINIC | Age: 38
End: 2018-02-07

## 2018-02-07 VITALS
HEART RATE: 80 BPM | RESPIRATION RATE: 14 BRPM | BODY MASS INDEX: 31.46 KG/M2 | HEIGHT: 65 IN | WEIGHT: 188.8 LBS | OXYGEN SATURATION: 99 % | SYSTOLIC BLOOD PRESSURE: 116 MMHG | TEMPERATURE: 97.6 F | DIASTOLIC BLOOD PRESSURE: 66 MMHG

## 2018-02-07 DIAGNOSIS — G43.C0 PERIODIC HEADACHE SYNDROME, NOT INTRACTABLE: ICD-10-CM

## 2018-02-07 DIAGNOSIS — I10 ESSENTIAL HYPERTENSION: Primary | ICD-10-CM

## 2018-02-07 DIAGNOSIS — F41.9 ANXIETY: ICD-10-CM

## 2018-02-07 RX ORDER — ALPRAZOLAM 0.5 MG/1
0.5 TABLET ORAL
Qty: 20 TAB | Refills: 0 | Status: SHIPPED | OUTPATIENT
Start: 2018-02-07 | End: 2018-02-07

## 2018-02-07 NOTE — PROGRESS NOTES
HISTORY OF PRESENT ILLNESS  Ramesh Connors is a 30 y.o. female. HPI   Patient reports she has been more anxious and unable to sleep since loss of provider. She states she has feelings of guilt and remorse. Patient notes she has a hard time sleeping at night. She states she has thoughts of his last day and continues to see his face. Patient states she has not needed a lot of meds to manage headaches. Hypertension ROS: taking medications as instructed, no medication side effects noted, no TIA's, no chest pain on exertion, no dyspnea on exertion, no swelling of ankles. New concerns:  Patient's BP in office today is 116/66. Patient is pregnant and due in April. Review of Systems   All other systems reviewed and are negative. Physical Exam   Constitutional: She is oriented to person, place, and time. She appears well-developed and well-nourished. HENT:   Head: Normocephalic and atraumatic. Right Ear: External ear normal.   Left Ear: External ear normal.   Nose: Nose normal.   Mouth/Throat: Oropharynx is clear and moist.   Eyes: Conjunctivae and EOM are normal.   Neck: Normal range of motion. Neck supple. Carotid bruit is not present. No thyroid mass and no thyromegaly present. Cardiovascular: Normal rate, regular rhythm, S1 normal, S2 normal, normal heart sounds and intact distal pulses. Pulmonary/Chest: Effort normal and breath sounds normal.   Abdominal: Soft. Normal appearance and bowel sounds are normal. There is no hepatosplenomegaly. There is no tenderness. Musculoskeletal: Normal range of motion. Neurological: She is alert and oriented to person, place, and time. She has normal strength. No cranial nerve deficit or sensory deficit. Coordination normal.   Skin: Skin is warm, dry and intact. No abrasion and no rash noted. Psychiatric: She has a normal mood and affect. Her behavior is normal. Judgment and thought content normal.   Nursing note and vitals reviewed.       ASSESSMENT and PLAN  Diagnoses and all orders for this visit:    1. Essential hypertension  BP is at goal. I do not recommend any change in medications. 2. Anxiety  Orginally ordered Xanax to treat, but contraindicated because patient is pregnant. She will follow up with GYN to discuss medications she can take to help manage anxiety. Referred patient to counselor.   -     REFERRAL TO PSYCHOLOGY  Over 50% of the 30 minutes face to face with James Barbour consisted of counseling and/or discussing treatment plans in reference to her personal issues, loss of provider and needs. 3. Periodic headache syndrome, not intractable  The current medical regimen is effective;  continue present plan and medications. This note will not be viewable in [address] lab results and schedule of future lab studies reviewed with patient  reviewed diet, exercise and weight control    Written by Martir Gasca, as dictated by Kris Shelley MD.     Current diagnosis and concerns discussed with pt at length. Understands risks and benefits or current treatment plan and medications and accepts the treatment and medication with any possible risks. Pt asks appropriate questions which were answered. Pt instructed to call with any concerns or problems.

## 2018-03-12 LAB — GRBS, EXTERNAL: POSITIVE

## 2018-04-02 ENCOUNTER — ANESTHESIA EVENT (OUTPATIENT)
Dept: LABOR AND DELIVERY | Age: 38
End: 2018-04-02
Payer: COMMERCIAL

## 2018-04-02 ENCOUNTER — HOSPITAL ENCOUNTER (INPATIENT)
Age: 38
LOS: 2 days | Discharge: HOME | End: 2018-04-04
Attending: STUDENT IN AN ORGANIZED HEALTH CARE EDUCATION/TRAINING PROGRAM | Admitting: STUDENT IN AN ORGANIZED HEALTH CARE EDUCATION/TRAINING PROGRAM
Payer: COMMERCIAL

## 2018-04-02 ENCOUNTER — ANESTHESIA (OUTPATIENT)
Dept: LABOR AND DELIVERY | Age: 38
End: 2018-04-02
Payer: COMMERCIAL

## 2018-04-02 PROBLEM — Z34.90 PREGNANCY: Status: ACTIVE | Noted: 2018-04-02

## 2018-04-02 LAB
ABO + RH BLD: NORMAL
ALBUMIN SERPL-MCNC: 2.5 G/DL (ref 3.5–5)
ALBUMIN/GLOB SERPL: 0.6 {RATIO} (ref 1.1–2.2)
ALP SERPL-CCNC: 161 U/L (ref 45–117)
ALT SERPL-CCNC: 19 U/L (ref 12–78)
ANION GAP SERPL CALC-SCNC: 11 MMOL/L (ref 5–15)
AST SERPL-CCNC: 17 U/L (ref 15–37)
BASOPHILS # BLD: 0 K/UL (ref 0–0.1)
BASOPHILS NFR BLD: 0 % (ref 0–1)
BILIRUB SERPL-MCNC: 0.2 MG/DL (ref 0.2–1)
BLOOD GROUP ANTIBODIES SERPL: NORMAL
BUN SERPL-MCNC: 8 MG/DL (ref 6–20)
BUN/CREAT SERPL: 10 (ref 12–20)
CALCIUM SERPL-MCNC: 9.2 MG/DL (ref 8.5–10.1)
CHLORIDE SERPL-SCNC: 102 MMOL/L (ref 97–108)
CO2 SERPL-SCNC: 23 MMOL/L (ref 21–32)
CREAT SERPL-MCNC: 0.82 MG/DL (ref 0.55–1.02)
DIFFERENTIAL METHOD BLD: ABNORMAL
EOSINOPHIL # BLD: 0 K/UL (ref 0–0.4)
EOSINOPHIL NFR BLD: 0 % (ref 0–7)
ERYTHROCYTE [DISTWIDTH] IN BLOOD BY AUTOMATED COUNT: 15.1 % (ref 11.5–14.5)
GLOBULIN SER CALC-MCNC: 4.4 G/DL (ref 2–4)
GLUCOSE SERPL-MCNC: 96 MG/DL (ref 65–100)
HCT VFR BLD AUTO: 34 % (ref 35–47)
HGB BLD-MCNC: 10.8 G/DL (ref 11.5–16)
IMM GRANULOCYTES # BLD: 0.1 K/UL (ref 0–0.04)
IMM GRANULOCYTES NFR BLD AUTO: 1 % (ref 0–0.5)
LYMPHOCYTES # BLD: 1.7 K/UL (ref 0.8–3.5)
LYMPHOCYTES NFR BLD: 12 % (ref 12–49)
MCH RBC QN AUTO: 26.1 PG (ref 26–34)
MCHC RBC AUTO-ENTMCNC: 31.8 G/DL (ref 30–36.5)
MCV RBC AUTO: 82.1 FL (ref 80–99)
MONOCYTES # BLD: 1.1 K/UL (ref 0–1)
MONOCYTES NFR BLD: 8 % (ref 5–13)
NEUTS SEG # BLD: 11.2 K/UL (ref 1.8–8)
NEUTS SEG NFR BLD: 79 % (ref 32–75)
NRBC # BLD: 0 K/UL (ref 0–0.01)
NRBC BLD-RTO: 0 PER 100 WBC
PLATELET # BLD AUTO: 309 K/UL (ref 150–400)
PMV BLD AUTO: 9.8 FL (ref 8.9–12.9)
POTASSIUM SERPL-SCNC: 4.2 MMOL/L (ref 3.5–5.1)
PROT SERPL-MCNC: 6.9 G/DL (ref 6.4–8.2)
RBC # BLD AUTO: 4.14 M/UL (ref 3.8–5.2)
SODIUM SERPL-SCNC: 136 MMOL/L (ref 136–145)
SPECIMEN EXP DATE BLD: NORMAL
WBC # BLD AUTO: 14.1 K/UL (ref 3.6–11)

## 2018-04-02 PROCEDURE — 36415 COLL VENOUS BLD VENIPUNCTURE: CPT | Performed by: STUDENT IN AN ORGANIZED HEALTH CARE EDUCATION/TRAINING PROGRAM

## 2018-04-02 PROCEDURE — 3E0R3BZ INTRODUCTION OF ANESTHETIC AGENT INTO SPINAL CANAL, PERCUTANEOUS APPROACH: ICD-10-PCS | Performed by: ANESTHESIOLOGY

## 2018-04-02 PROCEDURE — 77030011943

## 2018-04-02 PROCEDURE — 00HU33Z INSERTION OF INFUSION DEVICE INTO SPINAL CANAL, PERCUTANEOUS APPROACH: ICD-10-PCS | Performed by: ANESTHESIOLOGY

## 2018-04-02 PROCEDURE — 77030014125 HC TY EPDRL BBMI -B: Performed by: ANESTHESIOLOGY

## 2018-04-02 PROCEDURE — 76060000078 HC EPIDURAL ANESTHESIA: Performed by: ANESTHESIOLOGY

## 2018-04-02 PROCEDURE — 85025 COMPLETE CBC W/AUTO DIFF WBC: CPT | Performed by: STUDENT IN AN ORGANIZED HEALTH CARE EDUCATION/TRAINING PROGRAM

## 2018-04-02 PROCEDURE — 99283 EMERGENCY DEPT VISIT LOW MDM: CPT

## 2018-04-02 PROCEDURE — 80053 COMPREHEN METABOLIC PANEL: CPT | Performed by: STUDENT IN AN ORGANIZED HEALTH CARE EDUCATION/TRAINING PROGRAM

## 2018-04-02 PROCEDURE — 74011000258 HC RX REV CODE- 258: Performed by: STUDENT IN AN ORGANIZED HEALTH CARE EDUCATION/TRAINING PROGRAM

## 2018-04-02 PROCEDURE — 0KQM0ZZ REPAIR PERINEUM MUSCLE, OPEN APPROACH: ICD-10-PCS | Performed by: OBSTETRICS & GYNECOLOGY

## 2018-04-02 PROCEDURE — 74011000250 HC RX REV CODE- 250

## 2018-04-02 PROCEDURE — 75410000002 HC LABOR FEE PER 1 HR: Performed by: OBSTETRICS & GYNECOLOGY

## 2018-04-02 PROCEDURE — 10907ZC DRAINAGE OF AMNIOTIC FLUID, THERAPEUTIC FROM PRODUCTS OF CONCEPTION, VIA NATURAL OR ARTIFICIAL OPENING: ICD-10-PCS | Performed by: OBSTETRICS & GYNECOLOGY

## 2018-04-02 PROCEDURE — 74011250636 HC RX REV CODE- 250/636: Performed by: STUDENT IN AN ORGANIZED HEALTH CARE EDUCATION/TRAINING PROGRAM

## 2018-04-02 PROCEDURE — 86900 BLOOD TYPING SEROLOGIC ABO: CPT | Performed by: STUDENT IN AN ORGANIZED HEALTH CARE EDUCATION/TRAINING PROGRAM

## 2018-04-02 PROCEDURE — 77030018749 HC HK AMNIO DISP DERY -A

## 2018-04-02 PROCEDURE — 65270000029 HC RM PRIVATE

## 2018-04-02 PROCEDURE — 75410000000 HC DELIVERY VAGINAL/SINGLE: Performed by: OBSTETRICS & GYNECOLOGY

## 2018-04-02 PROCEDURE — 74011250636 HC RX REV CODE- 250/636: Performed by: ANESTHESIOLOGY

## 2018-04-02 PROCEDURE — 75410000003 HC RECOV DEL/VAG/CSECN EA 0.5 HR: Performed by: OBSTETRICS & GYNECOLOGY

## 2018-04-02 RX ORDER — SODIUM CHLORIDE, SODIUM LACTATE, POTASSIUM CHLORIDE, CALCIUM CHLORIDE 600; 310; 30; 20 MG/100ML; MG/100ML; MG/100ML; MG/100ML
125 INJECTION, SOLUTION INTRAVENOUS CONTINUOUS
Status: DISCONTINUED | OUTPATIENT
Start: 2018-04-02 | End: 2018-04-04 | Stop reason: HOSPADM

## 2018-04-02 RX ORDER — FENTANYL/BUPIVACAINE/NS/PF 2-1250MCG
1-16 PREFILLED PUMP RESERVOIR EPIDURAL CONTINUOUS
Status: DISCONTINUED | OUTPATIENT
Start: 2018-04-02 | End: 2018-04-03 | Stop reason: HOSPADM

## 2018-04-02 RX ORDER — BUPIVACAINE HYDROCHLORIDE 2.5 MG/ML
INJECTION, SOLUTION EPIDURAL; INFILTRATION; INTRACAUDAL AS NEEDED
Status: DISCONTINUED | OUTPATIENT
Start: 2018-04-02 | End: 2018-04-02 | Stop reason: HOSPADM

## 2018-04-02 RX ORDER — VALACYCLOVIR HYDROCHLORIDE 500 MG/1
500 TABLET, FILM COATED ORAL DAILY
COMMUNITY
End: 2020-09-15 | Stop reason: ALTCHOICE

## 2018-04-02 RX ORDER — BUTORPHANOL TARTRATE 1 MG/ML
1 INJECTION INTRAMUSCULAR; INTRAVENOUS
Status: COMPLETED | OUTPATIENT
Start: 2018-04-02 | End: 2018-04-02

## 2018-04-02 RX ORDER — OXYTOCIN IN 5 % DEXTROSE 30/500 ML
.5-2 PLASTIC BAG, INJECTION (ML) INTRAVENOUS
Status: DISCONTINUED | OUTPATIENT
Start: 2018-04-02 | End: 2018-04-04 | Stop reason: HOSPADM

## 2018-04-02 RX ORDER — SODIUM CHLORIDE 0.9 % (FLUSH) 0.9 %
5-10 SYRINGE (ML) INJECTION EVERY 8 HOURS
Status: DISCONTINUED | OUTPATIENT
Start: 2018-04-02 | End: 2018-04-03

## 2018-04-02 RX ORDER — SODIUM CHLORIDE 0.9 % (FLUSH) 0.9 %
5-10 SYRINGE (ML) INJECTION AS NEEDED
Status: DISCONTINUED | OUTPATIENT
Start: 2018-04-02 | End: 2018-04-04 | Stop reason: HOSPADM

## 2018-04-02 RX ORDER — METHYLERGONOVINE MALEATE 0.2 MG/ML
0.2 INJECTION INTRAVENOUS ONCE
Status: CANCELLED | OUTPATIENT
Start: 2018-04-02 | End: 2018-04-02

## 2018-04-02 RX ORDER — EPHEDRINE SULFATE 50 MG/ML
10 INJECTION, SOLUTION INTRAVENOUS
Status: DISCONTINUED | OUTPATIENT
Start: 2018-04-02 | End: 2018-04-03 | Stop reason: HOSPADM

## 2018-04-02 RX ADMIN — PENICILLIN G POTASSIUM 5 MILLION UNITS: 5000000 INJECTION, POWDER, FOR SOLUTION INTRAMUSCULAR; INTRAVENOUS at 07:48

## 2018-04-02 RX ADMIN — SODIUM CHLORIDE, SODIUM LACTATE, POTASSIUM CHLORIDE, AND CALCIUM CHLORIDE 500 ML: 600; 310; 30; 20 INJECTION, SOLUTION INTRAVENOUS at 19:40

## 2018-04-02 RX ADMIN — PENICILLIN G POTASSIUM 2.5 MILLION UNITS: 20000000 POWDER, FOR SOLUTION INTRAVENOUS at 12:20

## 2018-04-02 RX ADMIN — Medication 6 MILLI-UNITS/MIN: at 20:29

## 2018-04-02 RX ADMIN — BUPIVACAINE HYDROCHLORIDE 8 ML: 2.5 INJECTION, SOLUTION EPIDURAL; INFILTRATION; INTRACAUDAL at 15:58

## 2018-04-02 RX ADMIN — Medication 2 MILLI-UNITS/MIN: at 16:04

## 2018-04-02 RX ADMIN — FENTANYL 0.2 MG/100ML-BUPIV 0.125%-NACL 0.9% EPIDURAL INJ 12 ML/HR: 2/0.125 SOLUTION at 16:03

## 2018-04-02 RX ADMIN — PENICILLIN G POTASSIUM 2.5 MILLION UNITS: 20000000 POWDER, FOR SOLUTION INTRAVENOUS at 16:06

## 2018-04-02 RX ADMIN — SODIUM CHLORIDE, SODIUM LACTATE, POTASSIUM CHLORIDE, AND CALCIUM CHLORIDE 1000 ML: 600; 310; 30; 20 INJECTION, SOLUTION INTRAVENOUS at 15:30

## 2018-04-02 RX ADMIN — PENICILLIN G POTASSIUM 2.5 MILLION UNITS: 20000000 POWDER, FOR SOLUTION INTRAVENOUS at 20:39

## 2018-04-02 RX ADMIN — BUTORPHANOL TARTRATE 1 MG: 1 INJECTION, SOLUTION INTRAMUSCULAR; INTRAVENOUS at 08:23

## 2018-04-02 RX ADMIN — SODIUM CHLORIDE, SODIUM LACTATE, POTASSIUM CHLORIDE, AND CALCIUM CHLORIDE 125 ML/HR: 600; 310; 30; 20 INJECTION, SOLUTION INTRAVENOUS at 07:35

## 2018-04-02 NOTE — IP AVS SNAPSHOT
[address] 
[phone] Patient: Jose Lehman MRN: [id number] A check nicolette indicates which time of day the medication should be taken. My Medications CONTINUE taking these medications Instructions Each Dose to Equal  
 Morning Noon Evening Bedtime  
 amitriptyline 10 mg tablet Commonly known as:  ELAVIL Your last dose was: Your next dose is: Take 1 tablet by mouth nightly for 30 days. Appt needed prior to more refills 10 mg  
    
   
   
   
  
 MICROGESTIN FE 1/20 (28) 1 mg-20 mcg (21)/75 mg (7) Tab Generic drug:  norethindrone-ethinyl estradiol Your last dose was: Your next dose is: Take  by mouth. PRENATAL VITAMIN PO Your last dose was: Your next dose is: Take  by mouth. VALTREX 500 mg tablet Generic drug:  valACYclovir Your last dose was: Your next dose is: Take 500 mg by mouth daily. Indications: genital herpes simplex  500 mg

## 2018-04-02 NOTE — PROGRESS NOTES
9944: Patient arrived to L&D room 211, pt reports contractions every 5-7 min, pt states the contractions have been intense and more frequent since midnight. Patient oriented to room and unit, plan of care discussed with pt, pt verbalized understanding. 0715: Dr. José Miguel Kidd given update on patient, admission orders received. 0805: Dr. José Miguel Kidd given update and made aware that patient is requesting pain medication, orders received for stadol 1mg IVP TORB performed. MD also stated if patient wants she can be off the monitor to walk or get in the tub.     9416: Dr. José Miguel Kidd at bedside, plan of care discussed with patient and family. Patient verbalized understanding. 1328: Dr. José Miguel Kidd at bedside, MD discussing plan for augmentation of labor by artificially rupturing membranes, pt agreeable with POC. AROM performed by MD, clear fluid noted. MD stated patient can walk after reactive NST.     1508: Patient returned from walking, pt requesting epidural at this time, LR bolus started. 1900: Bedside and Verbal shift change report given to Kathy Holly RN (oncoming nurse) by LETTY Mustafa RN (offgoing nurse). Report included the following information SBAR, Kardex, Intake/Output, MAR, Recent Results and Med Rec Status.

## 2018-04-02 NOTE — ANESTHESIA PROCEDURE NOTES
Epidural Block    Start time: 4/2/2018 3:48 PM  End time: 4/2/2018 3:58 PM  Performed by: Paola Askew by: Jessy Gusman     Pre-Procedure  Indication: labor epidural    Preanesthetic Checklist: patient identified, risks and benefits discussed, anesthesia consent, timeout performed and anesthesia consent    Timeout Time: 15:48        Epidural:   Patient position:  Seated  Prep region:  Lumbar  Prep: Betadine    Location:  L3-4    Needle and Epidural Catheter:   Needle Type:  Tuohy  Needle Gauge:  17 G  Injection Technique:  Loss of resistance using air  Attempts:  1  Catheter Size:  18 G  Catheter at Skin Depth (cm):  10  Depth in Epidural Space (cm):  4  Events: no paresthesia and negative aspiration test    Test Dose:  Lidocaine 1.5% w/ epi and negative    Assessment:   Catheter Secured:  Tegaderm and tape  Insertion:  Uncomplicated  Patient tolerance:  Patient tolerated the procedure well with no immediate complications
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
## 2018-04-02 NOTE — PROGRESS NOTES
Labor Progress Note  Patient seen, fetal heart rate and contraction pattern evaluated at 0732    Physical Exam:  Cervical Exam was examined   7-8 cm dilated    80% effaced    +1 station    Presenting Part: cephalic  Cervical Position: mid position  Consistency: Soft    Membranes:    Uterine Activity[de-identified] Frequency: Every 3-5 minutes  Fetal Heart Rate: Reactive  Early decles with good recovery    Assessment/Plan:    Reassuring fetal status, Continue plan for vaginal delivery  Reassuring fetal status. Continue current managent.   Korey Dominguez MD  4/2/2018  7:49 PM

## 2018-04-02 NOTE — PROGRESS NOTES
Labor Progress Note  Patient seen, fetal heart rate and contraction pattern evaluated at 1330. Contractions have spaced out.      Physical Exam:  Vitals:   Vitals:    04/02/18 1239 04/02/18 1244 04/02/18 1249 04/02/18 1331   BP:    135/87   Pulse:    (!) 101   Resp:    18   Temp:    98.2 °F (36.8 °C)   SpO2: 100% 100% 100%    Weight:       Height:             Cervical Exam was examined   5 cm dilated    80% effaced    -2 station    Presenting Part: cephalic  AROM clear fluid     Uterine Activity[de-identified] Frequency: Every 5-6 minutes  Fetal Heart Rate: Reactive  Baseline: 125 per minute  Variability: moderate  Accelerations: yes  Decelerations: none  Pitocin: N/A     Assessment/Plan:  Ms. Tucker Scanlon is admitted with pregnancy at 38w6d here for term labor     AROM -clear fluid, encouraged ambulation after reactive tracing   Fetal surveillance reactive and reassuring   Start pitocin augmentation if AROM does not improve contraction frequency over the next 1-2yrs   Recheck after 4hrs of adequate CTX pattern     Tin Escobar, DO  4/2/2018  1:40 PM

## 2018-04-02 NOTE — PROGRESS NOTES
Labor Progress Note  Patient seen, fetal heart rate and contraction pattern evaluated at 1715. Resting comfortably with epidural in place.      Physical Exam:  Vitals:   Vitals:    04/02/18 1606 04/02/18 1608 04/02/18 1610 04/02/18 1611   BP: 116/56 121/58  133/58   Pulse: (!) 109 (!) 109  (!) 113   Resp:       Temp:       SpO2:   100%    Weight:       Height:             Cervical Exam was not examined, check by RN at 1640 6/C/0   Presenting Part: cephalic    Uterine Activity[de-identified] Frequency: Every 2 minutes  Fetal Heart Rate: Reactive  Baseline: 135 per minute  Variability: moderate  Accelerations: yes  Decelerations: none  Pitocin: 4mu/min     Assessment/Plan:  Ms. Rachele Favre is admitted with pregnancy at 38w6d in labor at term     Continue pitocin augmentation -diallo in good pattern   Labor progressing normally   Fetal tracing Category 2     Natasha Miller DO  4/2/2018  4:48 PM

## 2018-04-02 NOTE — IP AVS SNAPSHOT
[address] [facility], operated by [facility] 
 
 
 [address] 
[phone] Patient: Michael Dove MRN: [id number] BNU:[birth year] About your hospitalization You were admitted on:  April 2, 2018 You last received care in the:  [facility] You were discharged on:  April 4, 2018 Why you were hospitalized Your primary diagnosis was:  Not on File Your diagnoses also included:  Pregnancy Follow-up Information Follow up With Details Comments Contact Info Eduin Naranjo MD   [address] [facility] [address] 
[phone] Your Scheduled Appointments Thursday April 05, 2018  4:15 PM EDT ACUTE CARE with Calin Cantu MD  
[facility]) [place] [address] [place] [address]  
[phone] Thursday April 05, 2018  4:30 PM EDT ROUTINE CARE with Calin Cantu MD  
[facility]) [place] [address] [place] [address]  
[phone] Thursday April 12, 2018  4:00 PM EDT ACUTE CARE with Calin Cantu MD  
[facility]) [place] [address] [place] [address]  
[phone] Thursday April 19, 2018  4:00 PM EDT ACUTE CARE with Calin Cantu MD  
[facility]) [place] [address] [place] [address]  
[phone] Thursday April 19, 2018  4:15 PM EDT ACUTE CARE with Calin Cantu MD  
[facility]) [place] [address] [place] [address]  
[phone] Thursday April 26, 2018  4:00 PM EDT ACUTE CARE with Calin Cantu MD  
[facility] Internal [facility]) [address]  
[phone] Thursday April 26, 2018  4:30 PM EDT ROUTINE CARE with Patsy Casillas MD  
Formerly [facility], [facility] Internal Medicine [facility] [address] [place] [address]) [address]  
[phone] Discharge Orders None A check nicolette indicates which time of day the medication should be taken. My Medications CONTINUE taking these medications Instructions Each Dose to Equal  
 Morning Noon Evening Bedtime  
 amitriptyline 10 mg tablet Commonly known as:  ELAVIL Your last dose was: Your next dose is: Take 1 tablet by mouth nightly for 30 days. Appt needed prior to more refills 10 mg  
    
   
   
   
  
 MICROGESTIN FE 1/20 (28) 1 mg-20 mcg (21)/75 mg (7) Tab Generic drug:  norethindrone-ethinyl estradiol Your last dose was: Your next dose is: Take  by mouth. PRENATAL VITAMIN PO Your last dose was: Your next dose is: Take  by mouth. VALTREX 500 mg tablet Generic drug:  valACYclovir Your last dose was: Your next dose is: Take 500 mg by mouth daily. Indications: genital herpes simplex 500 mg Discharge Instructions Discharge Instructions for Vaginal Delivery Patient ID: 
Margret Choudhary [id number] 
88 y.o. 
1980 Take Home Medications Continue taking your prenatal vitamins if you are breastfeeding. Follow-up Appointment: 
Follow-up with vpfw in 6 weeks. Follow-up care is a key part of your treatment and safety. Be sure to make and go to all appointments, and call your doctor if you are having problems. Its also a good idea to know your test results and keep a list of the medicines you take. Activity Avoid anything in your vagina for 6 weeks (no intercourse, tampons, or douching). You may drive unless you are taking prescription pain medications. Climbing stairs and light lifting are ok after a vaginal delivery unless your doctor tells you not to. You may gradually work up to exercise over the next few weeks, but take it easy- you'll be tired! Diet You may eat a regular diet but you may want to avoid heavy, greasy foods and other foods that could increase constipation. Wound care If you have stitches, continue to rinse with a squirt bottle of warm water each time you use the bathroom for about 2 weeks. Your stitches will gradually dissolve over several weeks. Sitz baths are also helpful to keep the wound clean, encourage healing, and to help with pain associated with the stitches or hemorrhoids. You can use either a sitz bath basin or a bathtub filled with 2-3\" inches of plain warm water. Soak for 10 minutes 3 times a day. Pain Management If you were not given a prescription pain medication, you can take over the counter pain medicines like Tylenol (acetominophen), Advil or Motrin (ibuprofen). You can take acetominophen and ibuprofen together, alternating doses every few hours. You will get the most relief if you take the maximum dose: · Tylenol or acetominophen 1000 mg every 6 hours (equivalent to 2 Extra Strength Tylenols every 6 hours) · Motrin or Advil (generic ibuprofen) 800 mg every 8 hours (4 tablets or capsules every 8 hours) If you were given a prescription pain medication, you can take ibuprofen along with it (doses as above), but not Tylenol. Use ibuprofen as the main medication, and take the prescription medication if needed for more severe pain not relieved by the ibuprofen.   Your goal should be to take only the minimum necessary amounts of the prescription medication (narcotic), as these pain medicines can be habit-forming and will worsen or cause constipation. Most patients will find that within a couple of days, their pain is adequately controlled using only over-the-counter medications. A heating pad can also be very helpful for cramping or back pain. Over-the-counter hemorrhoid wipes and ointments are fine to use if you have hemorrhoids. Constipation You may find that bowel movements are irregular after delivery and that you have a tendency to be constipated. If you have stitches (and especially if you had a more severe tear called a third- or fourth-degree), your bowel movements will be more comfortable if they are soft. A stool softener such as Colace (docusate) can safely be taken daily if needed. If you become constipated you can use a laxative such as Dulcolax, Miralax or Milk of Magnesia. Don't wait until constipation is severe- take something sooner rather than later and you will feel much better! Your Recovery: What to Expect at [facility] Delivering a baby is hard work and you probably aren't getting much sleep, so you will certainly be tired. Try to rest when you can and don't worry about doing housework or other tasks which can wait. If you're experiencing soreness or swelling in your bottom, this should improve over the next few days to 2 weeks. You are likely to have some back pain or general body aches or muscle soreness. This should improve with acetominophen or ibuprofen. If your legs were swollen during your pregnancy or as a result of IV fluids given during your hospital stay, this should go away in a few days to a week. Most women experience some form of the \"Baby Blues\" after having a baby. This means that you may feel emotional, tearful, frustrated, anxious, sad, and irritable some of the time. This is normal if it's not severe and should go away after about 2 weeks. Getting as much rest as you can will help.   Accept assistance from friends and family members so that you can take breaks from caring for the baby. Call your doctor if your symptoms seem severe, last more than 2 weeks, or seem to be getting worse instead of better. Get help immediately if you have thoughts of wanting to hurt yourself or others! When should you call for help? Call 911 anytime you think you may need emergency care. For example, call if: You pass out (lose consciousness). You have sudden chest pain and shortness of breath, or you cough up blood. You have severe pain in your belly. Call your doctor now or seek immediate medical care if: 
You have heavy vaginal bleeding that soaks one or more pads in an hour, or you have large clots (golf ball size or larger). Your have foul-smelling discharge from your vagina. You are sick to your stomach or cannot keep fluids down. You have pain that does not get better after you take pain medicine. You have signs of infection, such as: Increased pain in your abdomen or vaginal area Red streaks, warmth, or tenderness of your breasts. A fever of 101 or greater (taken by mouth). You have signs of a blood clot, such as: 
Pain in your calf, back of knee, thigh, or groin. Redness and swelling in your leg or groin. You have trouble passing urine or stool, especially if you have pain or swelling in your lower belly; or if you are unable to have a bowel movement after taking a laxative. You have a fast or pounding heartbeat. [employer] Announcement We are excited to announce that we are making your provider's discharge notes available to you in [employer]. You will see these notes when they are completed and signed by the physician that discharged you from your recent hospital stay. If you have any questions or concerns about any information you see in Mafengwohart, please call the Health Information Department where you were seen or reach out to your Primary Care Provider for more information about your plan of care. Introducing [facility] & HEALTH SERVICES! Dear Darrell Sutton: Thank you for requesting a [employer] account. Our records indicate that you already have an active [employer] account. You can access your account anytime at [email or web address]. [email or web address] Education Did you know that you can access your hospital and ER discharge instructions at any time in [employer]? You can also review all of your test results from your hospital stay or ER visit. Additional Information If you have questions, please visit the Frequently Asked Questions section of the [employer] website at [email or web address] Remember, [employer] is NOT to be used for urgent needs. For medical emergencies, dial 911. Now available from your iPhone and Android! Introducing Alfred Duron As a Emelia Narayan patient, I wanted to make you aware of our electronic visit tool called Alfred Domi. Emelia Narayan 24/7 allows you to connect within minutes with a medical provider 24 hours a day, seven days a week via a mobile device or tablet or logging into a secure website from your computer. You can access Alfred Duron from anywhere in the [place]. A virtual visit might be right for you when you have a simple condition and feel like you just dont want to get out of bed, or cant get away from work for an appointment, when your regular Emelia Narayan provider is not available (evenings, weekends or holidays), or when youre out of town and need minor care. Electronic visits cost only $49 and if the Emelia Narayan 24/7 provider determines a prescription is needed to treat your condition, one can be electronically transmitted to a nearby pharmacy*. Please take a moment to enroll today if you have not already done so. The enrollment process is free and takes just a few minutes. To enroll, please download the Andrewdra Narayan 24/7 charo to your tablet or phone, or visit [email or web address] to enroll on your computer. And, as an [address] [place] [address] patient with a [employer] account, the results of your visits will be scanned into your electronic medical record and your primary care provider will be able to view the scanned results. We urge you to continue to see your regular [employer] provider for your ongoing medical care. And while your primary care provider may not be the one available when you seek a Alfred Baltazarfin virtual visit, the peace of mind you get from getting a real diagnosis real time can be priceless. For more information on Trans[employer]assandrafin, view our Frequently Asked Questions (FAQs) at [email or web address]. Sincerely, 
 
Deepa Bran MD 
Chief Medical Officer [employer] *:  certain medications cannot be prescribed via [employer] Providers Seen During Your Hospitalization Provider Specialty Primary office phone Rachael Pillai DO Obstetrics & Gynecology [phone] Your Primary Care Physician (PCP) Primary Care Physician Office Phone Office Fax [place], [address] [phone] You are allergic to the following Allergen Reactions Apple Nausea Only Nitrofurantoin Diarrhea Penicillins Other (comments) Pt reported \"sick\". 12/26/14 0738 \"ok with ancef/keflex\" Recent Documentation Height Weight Breastfeeding? BMI OB Status Smoking Status 1.651 m 87.1 kg Unknown 31.95 kg/m2 Recent pregnancy Never Smoker Emergency Contacts Name Discharge Info Relation Home Work Mobile [address] CAREGIVER [3] Other Relative [6] [phone] Patient Belongings The following personal items are in your possession at time of discharge: 
  Dental Appliances: None  Visual Aid: None      Home Medications: None   Jewelry: None  Clothing: At bedside    Other Valuables: None Please provide this summary of care documentation to your next provider. Signatures-by signing, you are acknowledging that this After Visit Summary has been reviewed with you and you have received a copy. Patient Signature:  ____________________________________________________________ Date:  ____________________________________________________________  
  
[facility] Signature:  ____________________________________________________________ Date:  ____________________________________________________________

## 2018-04-02 NOTE — ANESTHESIA PREPROCEDURE EVALUATION
Anesthetic History               Review of Systems / Medical History  Patient summary reviewed and pertinent labs reviewed    Pulmonary  Within defined limits                 Neuro/Psych   Within defined limits           Cardiovascular    Hypertension              Exercise tolerance: >4 METS     GI/Hepatic/Renal  Within defined limits              Endo/Other  Within defined limits           Other Findings              Physical Exam    Airway  Mallampati: II  TM Distance: 4 - 6 cm  Neck ROM: normal range of motion   Mouth opening: Normal     Cardiovascular    Rhythm: regular  Rate: normal         Dental    Dentition: Upper dentition intact and Lower dentition intact     Pulmonary  Breath sounds clear to auscultation               Abdominal  GI exam deferred       Other Findings            Anesthetic Plan    ASA: 2  Anesthesia type: epidural          Induction: Intravenous  Anesthetic plan and risks discussed with: Patient

## 2018-04-03 PROCEDURE — 77030021125

## 2018-04-03 PROCEDURE — 74011250637 HC RX REV CODE- 250/637: Performed by: OBSTETRICS & GYNECOLOGY

## 2018-04-03 PROCEDURE — 65270000029 HC RM PRIVATE

## 2018-04-03 RX ORDER — ACETAMINOPHEN 325 MG/1
650 TABLET ORAL
Status: DISCONTINUED | OUTPATIENT
Start: 2018-04-03 | End: 2018-04-04 | Stop reason: HOSPADM

## 2018-04-03 RX ORDER — DOCUSATE SODIUM 100 MG/1
100 CAPSULE, LIQUID FILLED ORAL
Status: DISCONTINUED | OUTPATIENT
Start: 2018-04-03 | End: 2018-04-04 | Stop reason: HOSPADM

## 2018-04-03 RX ORDER — OXYCODONE AND ACETAMINOPHEN 5; 325 MG/1; MG/1
1 TABLET ORAL
Status: DISCONTINUED | OUTPATIENT
Start: 2018-04-03 | End: 2018-04-04 | Stop reason: HOSPADM

## 2018-04-03 RX ORDER — OXYTOCIN/RINGER'S LACTATE 20/1000 ML
125-500 PLASTIC BAG, INJECTION (ML) INTRAVENOUS ONCE
Status: ACTIVE | OUTPATIENT
Start: 2018-04-03 | End: 2018-04-03

## 2018-04-03 RX ORDER — PROMETHAZINE HYDROCHLORIDE 25 MG/1
25 TABLET ORAL
Status: DISCONTINUED | OUTPATIENT
Start: 2018-04-03 | End: 2018-04-04 | Stop reason: HOSPADM

## 2018-04-03 RX ORDER — NALOXONE HYDROCHLORIDE 0.4 MG/ML
0.4 INJECTION, SOLUTION INTRAMUSCULAR; INTRAVENOUS; SUBCUTANEOUS AS NEEDED
Status: DISCONTINUED | OUTPATIENT
Start: 2018-04-03 | End: 2018-04-04 | Stop reason: HOSPADM

## 2018-04-03 RX ORDER — ZOLPIDEM TARTRATE 5 MG/1
5 TABLET ORAL
Status: DISCONTINUED | OUTPATIENT
Start: 2018-04-03 | End: 2018-04-04 | Stop reason: HOSPADM

## 2018-04-03 RX ORDER — HYDROMORPHONE HYDROCHLORIDE 2 MG/ML
1 INJECTION, SOLUTION INTRAMUSCULAR; INTRAVENOUS; SUBCUTANEOUS
Status: DISCONTINUED | OUTPATIENT
Start: 2018-04-03 | End: 2018-04-04 | Stop reason: HOSPADM

## 2018-04-03 RX ORDER — IBUPROFEN 800 MG/1
800 TABLET ORAL EVERY 8 HOURS
Status: DISCONTINUED | OUTPATIENT
Start: 2018-04-03 | End: 2018-04-04 | Stop reason: HOSPADM

## 2018-04-03 RX ORDER — HYDROCORTISONE ACETATE PRAMOXINE HCL 2.5; 1 G/100G; G/100G
CREAM TOPICAL AS NEEDED
Status: DISCONTINUED | OUTPATIENT
Start: 2018-04-03 | End: 2018-04-04 | Stop reason: HOSPADM

## 2018-04-03 RX ADMIN — IBUPROFEN 800 MG: 800 TABLET ORAL at 18:21

## 2018-04-03 RX ADMIN — IBUPROFEN 800 MG: 800 TABLET ORAL at 11:01

## 2018-04-03 RX ADMIN — IBUPROFEN 800 MG: 800 TABLET ORAL at 03:15

## 2018-04-03 NOTE — LACTATION NOTE
Daisha Hawthorne Lactation Consultant Signed  Progress Notes Date of Service: 04/03/18 1013         Problem: Lactation Care Plan  Goal: *Infant latching appropriately  Outcome: Progressing Towards Goal  Pt will successfully establish breastfeeding by feeding in response to infant's early feeding cues and/or to offer breast every 2-3 hours. Ways to obtain a deep latch and seek comfortable positioning shared, aware to keep log of feedings/output. Goal: *Weight loss less than 10% of birth weight  Outcome: Progressing Towards Goal     Encouraged mom to attempt feeding with baby led feeding cues. Just as sucking on fingers, rooting, mouthing. Looking for 8-12 feedings in 24 hours. Don't limit baby at breast, allow baby to come of breast on it's own. Baby may want to feed  often and may increase number of feedings on second day of life. Skin to skin encouraged.       If baby doesn't nurse,  Mom should  hand express  10-20 drops of colostrum and drip into baby's mouth, or give to baby by finger feeding, cup feeding, or spoon feeding at least every 2-3 hours.      Problem: Patient Education: Go to Patient Education Activity  Goal: Patient/Family Education  Outcome: Progressing Towards Goal  Discussed with mother her plan for feeding. Reviewed the benefits of exclusive breast milk feeding during the hospital stay. Informed her of the risks of using formula to supplement in the first few days of life as well as the benefits of successful breast milk feeding; referred her to the Breastfeeding booklet about this information. She acknowledges understanding of information reviewed and states that it is her plan to breastfeed her infant. Will support her choice and offer additional information as needed.      Hand Expression Education:  Mom taught how to manually hand express her colostrum.   Emphasized the importance of providing infant with valuable colostrum as infant rests skin to skin at breast.  Aware to avoid extended periods of non-feeding. Aware to offer 10-20+ drops of colostrum every 2-3 hours until infant is latching and nursing effectively. Taught the rationale behind this low tech but highly effective evidence based practice.     Comments: Pt will successfully establish breastfeeding by feeding in response to early feeding cues   or wake every 3h, will obtain deep latch, and will keep log of feedings/output. Taught to BF at hunger cues and or q 2-3 hrs and to offer 10-20 drops of hand expressed colostrum at any non-feeds.       Breast Assessment  Left Breast: Medium  Left Nipple: Everted, Intact, Short  Right Breast: Medium  Right Nipple: Everted, Intact, Short  Breast- Feeding Assessment  Attends Breast-Feeding Classes: No (Read about breastfeeding.  Taught breastfeeding basics)  Breast-Feeding Experience: No  Breast Trauma/Surgery: No  Type/Quality: Good  Lactation Consultant Visits  Breast-Feedings: Good  (Baby latched on well in biological nurturing position and nursed well on left breast. )  Mother/Infant Observation  Mother Observation: Alignment, Breast comfortable, Close hold, Holds breast, Recognizes feeding cues  Infant Observation: Audible swallows, Feeding cues, Latches nipple and aereolae, Lips flanged, lower, Lips flanged, upper, Opens mouth, Rhythmic suck  LATCH Documentation  Latch: Grasps breast, tongue down, lips flanged, rhythmic sucking  Audible Swallowing: A few with stimulation  Type of Nipple: Everted (after stimulation)  Comfort (Breast/Nipple): Soft/non-tender  Hold (Positioning): Full assist, teach one side, mother does other, staff holds (Nurse helped position baby)  LATCH Score: 8

## 2018-04-03 NOTE — DISCHARGE SUMMARY
Patient ID:  Dhruv Stallworth  [id number]  51 y.o.  1980    Admit Date: 2018    Discharge Date: 18    Admitting Physician: Ольга Roldan DO    Attending Physician: Ольга Roldan DO    Admission Diagnoses: Pregnancy  Pregnancy    Procedures for this admission:     Discharge Diagnoses: Same as above with vaginally producing a viable infant. Information for the patient's :  Lacy Gomez [[id number]]   One Minute Apgar: 5 (Filed from Delivery Summary)  Five  Minute Apgar: 5 (Filed from Delivery Summary)        Discharge Disposition:  home    Discharge Condition:  stable      Maternal Labs:   Lab Results   Component Value Date/Time    HBsAg, External negative 2017    HIV, External negative 2017    Rubella, External immune 2017    RPR, External non-reactive 2017    GrBStrep, External positive 2018       Cord Blood Results:   Information for the patient's :  Lacy Gomez [[id number]]     Lab Results   Component Value Date/Time    ABO/Rh(D) O POSITIVE 2018 11:08 PM    BRYANT IgG NEG 2018 11:08 PM    Bilirubin if BRYANT pos: IF DIRECT MARK POSITIVE, BILIRUBIN TO FOLLOW 2018 11:08 PM           History of Present Illness:   OB History      Para Term  AB Living    4 1 1  3 1    SAB TAB Ectopic Molar Multiple Live Births    0 2 1  0 1        Admitted for active labor. Hospital Course:   Patient was admitted as above and delivered via vagina . Please the chart for details. The postpartum course was unremarkable. She was deemed stable for discharge home on day 2.     Follow-up Care: 6 w        Signed:  Juanpablo Hopkins MD  4/3/2018  6:11 PM

## 2018-04-03 NOTE — ROUTINE PROCESS
Bedside and Verbal shift change report given to Victorina Harris (oncoming nurse) by Ana GUARDADO (offgoing nurse). Report included the following information SBAR, Kardex, Intake/Output, MAR and Recent Results.

## 2018-04-03 NOTE — ROUTINE PROCESS
Bedside and Verbal shift change report given to BRITNEY Carmona RN (oncoming nurse) by Vicki Zelaya (offgoing nurse). Report included the following information SBAR, Procedure Summary, Intake/Output, MAR, Accordion, Recent Results and Med Rec Status.

## 2018-04-03 NOTE — ROUTINE PROCESS
Bedside and Verbal shift change report given to ENMANUEL Hernandez RN (oncoming nurse) by BRITNEY Carmona RN (offgoing nurse). Report included the following information SBAR, Kardex, Procedure Summary, Intake/Output, MAR and Recent Results.

## 2018-04-03 NOTE — PROGRESS NOTES
Bedside shift change report given to Rona Neal RN (oncoming nurse) by Madison Mcbride RN (offgoing nurse). Report included the following information SBAR, Kardex, Intake/Output and MAR.

## 2018-04-03 NOTE — ROUTINE PROCESS
SBAR IN Report: Mother    Verbal report received from [facility]. Caitlin Anderson RN (full name & credentials) on this patient, who is now being transferred from L&D (unit) for routine progression of care. The patient is not wearing a green \"Anesthesia-Duramorph\" band. Report consisted of patient's Situation, Background, Assessment and Recommendations (SBAR). [place] ID bands were compared with the identification form, and verified with the patient and transferring nurse. Information from the SBAR, Procedure Summary, Intake/Output, MAR, Accordion, Recent Results and Med Rec Status and the Lb Report was reviewed with the transferring nurse; opportunity for questions and clarification provided.

## 2018-04-03 NOTE — PROGRESS NOTES
Post-Partum Day Number 1 Progress Note    [facility]       Information for the patient's :  Rachele Favre, Male [[id number]]   Vaginal, Spontaneous Delivery   Patient doing well without significant complaint. Voiding without difficulty, normal lochia. Tolerating diet without nausea or vomiting. Pain controlled with oral medications. Vitals:  Visit Vitals    /67 (BP 1 Location: Right arm, BP Patient Position: At rest)    Pulse 94    Temp 98.8 °F (37.1 °C)    Resp 16    Ht 5' 5\" (1.651 m)    Wt 87.1 kg (192 lb)    LMP 2017 (Exact Date)    SpO2 100%    Breastfeeding Unknown    BMI 31.95 kg/m2     Temp (24hrs), Av.4 °F (36.9 °C), Min:98.1 °F (36.7 °C), Max:98.8 °F (37.1 °C)        Exam:   Patient without distress.                 FF @ U-2 NT                LE NT w/o edema    Labs:     Lab Results   Component Value Date/Time    WBC 14.1 (H) 2018 07:31 AM    WBC 11.6 (H) 2017 11:21 PM    WBC 7.6 2016 09:51 AM    WBC 6.2 06/15/2015 12:09 PM    WBC 9.0 10/02/2013 12:16 PM    WBC 6.4 2013 08:24 AM    WBC 9.2 2012 11:32 AM    WBC 8.4 2011 08:22 AM    WBC 6.6 2010 08:52 AM    WBC 7.2 2010 08:31 AM    WBC 6.5 2009 11:40 AM    HGB 10.8 (L) 2018 07:31 AM    HGB 12.3 2017 11:21 PM    HGB 12.1 2016 09:51 AM    HGB 11.9 06/15/2015 12:09 PM    HGB 12.4 10/02/2013 12:16 PM    HGB 12.1 2013 08:24 AM    HGB 12.2 2012 11:32 AM    HGB 12.4 2011 08:22 AM    HGB 12.3 2010 08:52 AM    HGB 11.9 2010 08:31 AM    HGB 12.2 2009 11:40 AM    HCT 34.0 (L) 2018 07:31 AM    HCT 35.7 2017 11:21 PM    HCT 36.3 2016 09:51 AM    HCT 36.6 06/15/2015 12:09 PM    HCT 38.3 10/02/2013 12:16 PM    HCT 37.5 2013 08:24 AM    HCT 38.7 2012 11:32 AM    HCT 38.0 2011 08:22 AM    HCT 36.9 2010 08:52 AM    HCT 36.9 2010 08:31 AM    HCT 37.2 2009 11:40 AM    PLATELET 778 2018 07:31 AM    PLATELET 466  11:21 PM    PLATELET 179  09:51 AM    PLATELET 292  12:09 PM    PLATELET 684  12:16 PM    PLATELET 415  08:24 AM    PLATELET 088  11:32 AM    PLATELET 438  08:22 AM    PLATELET 112  08:52 AM    PLATELET 397  08:31 AM    PLATELET 096  11:40 AM    Hgb, External 12.6 2017    Hct, External 38.3 2017     Lab Results   Component Value Date/Time    Rubella, External immune 2017    GrBStrep, External positive 2018    HBsAg, External negative 2017    HIV, External negative 2017    RPR, External non-reactive 2017    Gonorrhea, External negative 2017    Chlamydia, External negative 2017         Information for the patient's :  Olinda Wall, Male [[id number]]   One Minute Apgar: 5 (Filed from Delivery Summary)  Five  Minute Apgar: 9 (Filed from Delivery Summary)      Assessment:   PPD 1 s/p , Doing well and stable  Plan:  1. Continue routine postpartum care  2.  Baby boy: desires circ, consent reviewed, will try to perform later today     Courtney Easley DO  4/3/2018  7:49 AM

## 2018-04-03 NOTE — L&D DELIVERY NOTE
Delivery Summary    Patient: Nicole Miller MRN: [id number]  SSN: [id number]    YOB: 1980  Age: 30 y.o. Sex: female        Labor Events:    Labor: No    Rupture Date: 2018    Rupture Time: 1:28 PM    Rupture Type AROM    Amniotic Fluid Volume:      Amniotic Fluid Description: Clear  None    Induction: None        Augmentation: Oxytocin    Labor Complications: None     Additional Complications:        Cervical Ripening:       None      Delivery Events:  Episiotomy: None    Laceration(s): Second degree perineal      Repaired:       Number of Repair Packets: 1    Suture Type and Size:         Estimated Blood Loss (ml):   350 cc       Information for the patient's newbornJudy Ivory Male [[id number]]     Delivery Summary - Baby    Delivery Date: 2018   Delivery Time: 10:36 PM   Delivery Type: Vaginal, Spontaneous Delivery  Sex:  male  Gestational Age: 38w7d  Delivery Clinician:  Aly Saenz?:     Delivery Location:               [facility]  One minute Five minutes Ten minutes   Skin Color:            Heart Rate:             Reflex Irritability:             Muscle Tone:           Respiration:             Total:               Presentation: Vertex  Position:        Resuscitation Method:  Suctioning-bulb; Tactile Stimulation     Meconium Stained: None    Cord Information: 3 Vessels   Complications:    Cord Blood Sent?:  Yes    Blood Gases Sent?:  No    Placenta:  Date/Time:  10:50 PM  Removal: Spontaneous      Appearance: Normal     [place] Measurements:  Birth Weight:      Birth Length:     Head Circumference:       Chest Circumference:      Abdominal Girth:       Other Providers:   MC Partida;;;;BRITTONDarleene Lundborg Obstetrician;Primary Nurse;Primary [place] Nurse;Nicu Nurse;Neonatologist;Anesthesiologist;Crna;Nurse Practitioner;Midwife;Nursery Nurse           Cord Blood Results:  Information for the patient's :  Vasu Barajas, Male [[id number]]   No results found for: Charma Ape, PCTDIG, BILI, ABORHEXT, [address]    Information for the patient's :  Etelvina Rivers, Male [[id number]]   No results found for: APH, APCO2, APO2, AHCO3, ABEC, ABDC, O2ST, [place], [place], PHI, [place], PO2I, HCO3I, SO2I, IBD     Information for the patient's :  Etelvina Rivers, Male [[id number]]   No results found for: EPHV, PCO2V, PO2V, HCO3V, O2STV, EBDV

## 2018-04-03 NOTE — PROGRESS NOTES
Patient voided on bedpan, 900cc lochia tinged urine without difficulty. Patient unable to walk secondary to epidural anesthesia, able to bear weight completely on right leg, but only partially bear weight on left leg.    330; patient to wheelchair and transferred with infant to MIU. SBAR report given to MILIND BELL.

## 2018-04-04 VITALS
BODY MASS INDEX: 31.99 KG/M2 | WEIGHT: 192 LBS | HEIGHT: 65 IN | TEMPERATURE: 97.9 F | DIASTOLIC BLOOD PRESSURE: 76 MMHG | HEART RATE: 76 BPM | OXYGEN SATURATION: 100 % | SYSTOLIC BLOOD PRESSURE: 121 MMHG | RESPIRATION RATE: 17 BRPM

## 2018-04-04 PROCEDURE — 74011250637 HC RX REV CODE- 250/637: Performed by: OBSTETRICS & GYNECOLOGY

## 2018-04-04 RX ADMIN — IBUPROFEN 800 MG: 800 TABLET ORAL at 02:42

## 2018-04-04 NOTE — ROUTINE PROCESS
Discharge instructions given to patient including but not limited to signs and symptoms and who to call; restrictions and limitations; post partum depression. All questions answered. Discharge paperwork signed in computer. No prescription given to patient.

## 2018-04-04 NOTE — LACTATION NOTE
This note was copied from a baby's chart. Mom and baby for discharge. Mother states baby has been breastfeeding well. LC discussed the following:    Reviewed breastfeeding basics:  Supply and demand, breastfeed baby 8-12 times in 24 hr.,   stomach size, early  Feeding cues, skin to skin, positioning and baby led latch-on, assymetrical latch with signs of good, deep latch vs shallow, feeding frequency and duration, and log sheet for tracking infant feedings and output. Breastfeeding Booklet and Warm line information given. Discussed typical  weight loss and the importance of infant weight checks with pediatrician 1-2 post discharge. Baby has a pediatric appt. Tomorrow. Engorgement Care Guidelines:  Reviewed how milk is made and normal phases of milk production. Taught care of engorged breasts - frequent breastfeeding encouraged, cool packs and motrin as tolerated. Anticipatory guidance shared. Care for sore/tender nipples discussed:  ways to improve positioning and latch practiced and discussed, hand express colostrum after feedings and let air dry, light application of lanolin, hydrogel pads, seek comfortable laid back feeding position, start feedings on least sore side first.    Discussed eating a healthy diet. Instructed mother to eat a variety of foods in order to get a well balanced diet. She should consume an extra 500 calories per day (more than her non-pregnant requirement.) These extra calories will help provide energy needed for optimal breast milk production. Mother also encouraged to \"drink to thirst\" and it is recommended that she drink fluids such as water, fruit/vegetable juice. Nutritious snacks should be available so that she can eat throughout the day to help satisfy her hunger and maintain a good milk supply. Discussed importance of monitoring outputs and feedings on first week of life.   Discussed ways to tell if baby is  getting enough breast milk, ie  voids and stools, change in color of stool, and return to birth wt within 2 weeks. Follow up with pediatrician visit for weight check in 1-2 days (per AAP guidelines.)  Encouraged to call Warm Line  [phone] or [facility] at [phone] for any questions/problems that arise.  Mother also given breastfeeding support group dates and times for any future needs

## 2018-04-04 NOTE — PROGRESS NOTES
Bedside shift change report given to Ze Owens RN (oncoming nurse) by Laith Torres (offgoing nurse). Report included the following information SBAR and MAR.

## 2018-04-04 NOTE — DISCHARGE INSTRUCTIONS
Discharge Instructions for Vaginal Delivery    Patient ID:  Jaquan Esquivel  [id number]  63 y.o.  1980    Take Home Medications           Continue taking your prenatal vitamins if you are breastfeeding. Follow-up Appointment:  Follow-up with vpf[facility] in 6 weeks. Follow-up care is a key part of your treatment and safety. Be sure to make and go to all appointments, and call your doctor if you are having problems. Its also a good idea to know your test results and keep a list of the medicines you take. Activity  Avoid anything in your vagina for 6 weeks (no intercourse, tampons, or douching). You may drive unless you are taking prescription pain medications. Climbing stairs and light lifting are ok after a vaginal delivery unless your doctor tells you not to. You may gradually work up to exercise over the next few weeks, but take it easy- you'll be tired! Diet  You may eat a regular diet but you may want to avoid heavy, greasy foods and other foods that could increase constipation. Wound care  If you have stitches, continue to rinse with a squirt bottle of warm water each time you use the bathroom for about 2 weeks. Your stitches will gradually dissolve over several weeks. Sitz baths are also helpful to keep the wound clean, encourage healing, and to help with pain associated with the stitches or hemorrhoids. You can use either a sitz bath basin or a bathtub filled with 2-3\" inches of plain warm water. Soak for 10 minutes 3 times a day. Pain Management  If you were not given a prescription pain medication, you can take over the counter pain medicines like Tylenol (acetominophen), Advil or Motrin (ibuprofen). You can take acetominophen and ibuprofen together, alternating doses every few hours.   You will get the most relief if you take the maximum dose:  · Tylenol or acetominophen 1000 mg every 6 hours (equivalent to 2 Extra Strength Tylenols every 6 hours)  · Motrin or Advil (generic ibuprofen) 800 mg every 8 hours (4 tablets or capsules every 8 hours)  If you were given a prescription pain medication, you can take ibuprofen along with it (doses as above), but not Tylenol. Use ibuprofen as the main medication, and take the prescription medication if needed for more severe pain not relieved by the ibuprofen. Your goal should be to take only the minimum necessary amounts of the prescription medication (narcotic), as these pain medicines can be habit-forming and will worsen or cause constipation. Most patients will find that within a couple of days, their pain is adequately controlled using only over-the-counter medications. A heating pad can also be very helpful for cramping or back pain. Over-the-counter hemorrhoid wipes and ointments are fine to use if you have hemorrhoids. Constipation  You may find that bowel movements are irregular after delivery and that you have a tendency to be constipated. If you have stitches (and especially if you had a more severe tear called a third- or fourth-degree), your bowel movements will be more comfortable if they are soft. A stool softener such as Colace (docusate) can safely be taken daily if needed. If you become constipated you can use a laxative such as Dulcolax, Miralax or Milk of Magnesia. Don't wait until constipation is severe- take something sooner rather than later and you will feel much better! Your Recovery: What to Expect at Home  Delivering a baby is hard work and you probably aren't getting much sleep, so you will certainly be tired. Try to rest when you can and don't worry about doing housework or other tasks which can wait. If you're experiencing soreness or swelling in your bottom, this should improve over the next few days to 2 weeks. You are likely to have some back pain or general body aches or muscle soreness. This should improve with acetominophen or ibuprofen.   If your legs were swollen during your pregnancy or as a result of IV fluids given during your hospital stay, this should go away in a few days to a week. Most women experience some form of the \"Baby Blues\" after having a baby. This means that you may feel emotional, tearful, frustrated, anxious, sad, and irritable some of the time. This is normal if it's not severe and should go away after about 2 weeks. Getting as much rest as you can will help. Accept assistance from friends and family members so that you can take breaks from caring for the baby. Call your doctor if your symptoms seem severe, last more than 2 weeks, or seem to be getting worse instead of better. Get help immediately if you have thoughts of wanting to hurt yourself or others! When should you call for help? Call 911 anytime you think you may need emergency care. For example, call if:  You pass out (lose consciousness). You have sudden chest pain and shortness of breath, or you cough up blood. You have severe pain in your belly. Call your doctor now or seek immediate medical care if:  You have heavy vaginal bleeding that soaks one or more pads in an hour, or you have large clots (golf ball size or larger). Your have foul-smelling discharge from your vagina. You are sick to your stomach or cannot keep fluids down. You have pain that does not get better after you take pain medicine. You have signs of infection, such as: Increased pain in your abdomen or vaginal area  Red streaks, warmth, or tenderness of your breasts. A fever of 101 or greater (taken by mouth). You have signs of a blood clot, such as:  Pain in your calf, back of knee, thigh, or groin. Redness and swelling in your leg or groin. You have trouble passing urine or stool, especially if you have pain or swelling in your lower belly; or if you are unable to have a bowel movement after taking a laxative. You have a fast or pounding heartbeat.

## 2018-04-04 NOTE — PROGRESS NOTES
PostPartum Note    Margret Choudhary  [id number]  1980  30 y.o.    S:  Ms. Margret Choudhary is a 30 y.o. G4 66 91 28 PPD #2 s/p TSVD @ 38w6d. Doing well. She had a baby boy. Her lochia is like a period. She describes her pain as mild and is well controlled with PO medications. She is breast feeding and this is going well. She is ambulating and voiding. Tolerating PO intake. O:   Visit Vitals    /78 (BP 1 Location: Right arm, BP Patient Position: At rest)    Pulse 77    Temp 98.4 °F (36.9 °C)    Resp 16    Ht 5' 5\" (1.651 m)    Wt 87.1 kg (192 lb)    LMP 07/02/2017 (Exact Date)    SpO2 100%    Breastfeeding Unknown    BMI 31.95 kg/m2       Lab Results   Component Value Date/Time    WBC 14.1 (H) 04/02/2018 07:31 AM    HGB 10.8 (L) 04/02/2018 07:31 AM    HCT 34.0 (L) 04/02/2018 07:31 AM    PLATELET 317 92/38/2463 07:31 AM    MCV 82.1 04/02/2018 07:31 AM    Hgb, External 12.6 09/07/2017    Hct, External 38.3 09/07/2017       Gen - No acute distress  Abdomen - Fundus firm, below the umbilicus   Ext - Warm, well perfused. Nontender    A/P:  PPD #2 s/p TSVD @ 38w6d doing well. 1.  Routine PP instructions/ care discussed  2. Blood type - Rh pos  3. Rubella imm  4. Circumcision sp   5. Discharge home today   6. F/U 4-6 weeks for PP check.       Tanya Ba MD  [facility]

## 2018-07-25 ENCOUNTER — TELEPHONE (OUTPATIENT)
Dept: INTERNAL MEDICINE CLINIC | Age: 38
End: 2018-07-25

## 2018-07-25 DIAGNOSIS — R39.89 ABNORMAL URINE COLOR: Primary | ICD-10-CM

## 2018-07-25 NOTE — TELEPHONE ENCOUNTER
Patient states her urine has been smelling strong and looks very dark. She denies having urinary symptoms such as abdominal pain. I will send urine for testing. Order should print at the back printer.  thanks

## 2018-07-27 LAB
BACTERIA UR CULT: ABNORMAL
BACTERIA UR CULT: ABNORMAL

## 2018-07-30 ENCOUNTER — TELEPHONE (OUTPATIENT)
Dept: INTERNAL MEDICINE CLINIC | Age: 38
End: 2018-07-30

## 2018-07-30 RX ORDER — CIPROFLOXACIN 250 MG/1
250 TABLET, FILM COATED ORAL 2 TIMES DAILY
Qty: 6 TAB | Refills: 0 | Status: SHIPPED | OUTPATIENT
Start: 2018-07-30 | End: 2018-07-30

## 2018-07-31 DIAGNOSIS — R82.90 ABNORMAL URINE: Primary | ICD-10-CM

## 2018-07-31 LAB
BILIRUB UR QL STRIP: NEGATIVE
GLUCOSE UR-MCNC: NEGATIVE MG/DL
KETONES P FAST UR STRIP-MCNC: NEGATIVE MG/DL
PH UR STRIP: 6 [PH] (ref 4.6–8)
PROT UR QL STRIP: NEGATIVE
SP GR UR STRIP: 1.01 (ref 1–1.03)
UA UROBILINOGEN AMB POC: NORMAL (ref 0.2–1)
URINALYSIS CLARITY POC: CLEAR
URINALYSIS COLOR POC: YELLOW
URINE BLOOD POC: NEGATIVE
URINE LEUKOCYTES POC: NORMAL
URINE NITRITES POC: NEGATIVE

## 2018-11-07 ENCOUNTER — HOSPITAL ENCOUNTER (EMERGENCY)
Age: 38
Discharge: HOME | End: 2018-11-07
Attending: EMERGENCY MEDICINE
Payer: COMMERCIAL

## 2018-11-07 ENCOUNTER — APPOINTMENT (OUTPATIENT)
Dept: ULTRASOUND IMAGING | Age: 38
End: 2018-11-07
Attending: EMERGENCY MEDICINE
Payer: COMMERCIAL

## 2018-11-07 VITALS
SYSTOLIC BLOOD PRESSURE: 135 MMHG | OXYGEN SATURATION: 100 % | RESPIRATION RATE: 16 BRPM | DIASTOLIC BLOOD PRESSURE: 85 MMHG | TEMPERATURE: 98.6 F | HEART RATE: 94 BPM

## 2018-11-07 DIAGNOSIS — O03.4 INCOMPLETE ABORTION: Primary | ICD-10-CM

## 2018-11-07 LAB
ALBUMIN SERPL-MCNC: 3.2 G/DL (ref 3.5–5)
ALBUMIN/GLOB SERPL: 0.7 {RATIO} (ref 1.1–2.2)
ALP SERPL-CCNC: 113 U/L (ref 45–117)
ALT SERPL-CCNC: 15 U/L (ref 12–78)
ANION GAP SERPL CALC-SCNC: 9 MMOL/L (ref 5–15)
AST SERPL-CCNC: 12 U/L (ref 15–37)
BASOPHILS # BLD: 0.1 K/UL (ref 0–0.1)
BASOPHILS NFR BLD: 0 % (ref 0–1)
BILIRUB SERPL-MCNC: 0.3 MG/DL (ref 0.2–1)
BUN SERPL-MCNC: 11 MG/DL (ref 6–20)
BUN/CREAT SERPL: 14 (ref 12–20)
CALCIUM SERPL-MCNC: 8.8 MG/DL (ref 8.5–10.1)
CHLORIDE SERPL-SCNC: 104 MMOL/L (ref 97–108)
CO2 SERPL-SCNC: 26 MMOL/L (ref 21–32)
COMMENT, HOLDF: NORMAL
CREAT SERPL-MCNC: 0.77 MG/DL (ref 0.55–1.02)
DIFFERENTIAL METHOD BLD: ABNORMAL
EOSINOPHIL # BLD: 0.1 K/UL (ref 0–0.4)
EOSINOPHIL NFR BLD: 1 % (ref 0–7)
ERYTHROCYTE [DISTWIDTH] IN BLOOD BY AUTOMATED COUNT: 14.6 % (ref 11.5–14.5)
GLOBULIN SER CALC-MCNC: 4.9 G/DL (ref 2–4)
GLUCOSE SERPL-MCNC: 91 MG/DL (ref 65–100)
HCT VFR BLD AUTO: 34.6 % (ref 35–47)
HGB BLD-MCNC: 10.9 G/DL (ref 11.5–16)
IMM GRANULOCYTES # BLD: 0.1 K/UL (ref 0–0.04)
IMM GRANULOCYTES NFR BLD AUTO: 1 % (ref 0–0.5)
LYMPHOCYTES # BLD: 2.3 K/UL (ref 0.8–3.5)
LYMPHOCYTES NFR BLD: 19 % (ref 12–49)
MCH RBC QN AUTO: 27.1 PG (ref 26–34)
MCHC RBC AUTO-ENTMCNC: 31.5 G/DL (ref 30–36.5)
MCV RBC AUTO: 86.1 FL (ref 80–99)
MONOCYTES # BLD: 0.9 K/UL (ref 0–1)
MONOCYTES NFR BLD: 7 % (ref 5–13)
NEUTS SEG # BLD: 8.6 K/UL (ref 1.8–8)
NEUTS SEG NFR BLD: 72 % (ref 32–75)
NRBC # BLD: 0 K/UL (ref 0–0.01)
NRBC BLD-RTO: 0 PER 100 WBC
PLATELET # BLD AUTO: 340 K/UL (ref 150–400)
PMV BLD AUTO: 9.9 FL (ref 8.9–12.9)
POTASSIUM SERPL-SCNC: 3.2 MMOL/L (ref 3.5–5.1)
PROT SERPL-MCNC: 8.1 G/DL (ref 6.4–8.2)
RBC # BLD AUTO: 4.02 M/UL (ref 3.8–5.2)
SAMPLES BEING HELD,HOLD: NORMAL
SODIUM SERPL-SCNC: 139 MMOL/L (ref 136–145)
WBC # BLD AUTO: 12 K/UL (ref 3.6–11)

## 2018-11-07 PROCEDURE — 99284 EMERGENCY DEPT VISIT MOD MDM: CPT

## 2018-11-07 PROCEDURE — 36415 COLL VENOUS BLD VENIPUNCTURE: CPT | Performed by: EMERGENCY MEDICINE

## 2018-11-07 PROCEDURE — 76817 TRANSVAGINAL US OBSTETRIC: CPT

## 2018-11-07 PROCEDURE — 76801 OB US < 14 WKS SINGLE FETUS: CPT

## 2018-11-07 PROCEDURE — 80053 COMPREHEN METABOLIC PANEL: CPT | Performed by: EMERGENCY MEDICINE

## 2018-11-07 PROCEDURE — 85025 COMPLETE CBC W/AUTO DIFF WBC: CPT | Performed by: EMERGENCY MEDICINE
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
## 2018-11-08 NOTE — ED TRIAGE NOTES
Pt arrives from home with complaints of vaginal bleeding that began Saturday as light bleeding and increased by Monday. Pt changing pad q2h since Monday. Saw OB GYN on Monday  And was told she was having miscarriage. Pt was given flagyl and doxycyline for a possible infection, which she began today. Pt having RIGHT sided pelvic pain since appt on Monday. Pt was about 9 weeks pregnant and this was her 2nd pregnancy. +nausea and lightheaded

## 2018-11-08 NOTE — DISCHARGE INSTRUCTIONS
Miscarriage: Care Instructions  Your Care Instructions    The loss of a pregnancy can be very hard. You may wonder why it happened or blame yourself. Miscarriages are common and are not caused by exercise, stress, or sex. Most happen because the fertilized egg in the uterus does not develop normally. There is no treatment that can stop a miscarriage. As long as you do not have heavy blood loss, fever, weakness, or other signs of infection, you can let a miscarriage follow its own course. This can take several days. Your body will recover over the next several weeks. Having a miscarriage does not mean you cannot have a normal pregnancy in the future. The doctor has checked you carefully, but problems can develop later. If you notice any problems or new symptoms, get medical treatment right away. Follow-up care is a key part of your treatment and safety. Be sure to make and go to all appointments, and call your doctor if you are having problems. It's also a good idea to know your test results and keep a list of the medicines you take. How can you care for yourself at home? · You will probably have some vaginal bleeding for 1 to 2 weeks. It may be similar to or slightly heavier than a normal period. The bleeding should get lighter after a week. Use pads instead of tampons. You may use tampons during your next period, which should start in 3 to 6 weeks. · Take an over-the-counter pain medicine, such as acetaminophen (Tylenol), ibuprofen (Advil, Motrin), or naproxen (Aleve) for cramps. Read and follow all instructions on the label. You may have cramps for several days after the miscarriage. · Do not take two or more pain medicines at the same time unless the doctor told you to. Many pain medicines have acetaminophen, which is Tylenol. Too much acetaminophen (Tylenol) can be harmful. · Use a clear container to save any tissue that you pass. Take it to your doctor's office as soon as you can.   · Do not have sex until the bleeding stops. · You may return to your normal activities if you feel well enough to do so. But you should avoid heavy exercise until the bleeding stops. · If you plan to get pregnant again, check with your doctor. Most doctors suggest waiting until you have had at least one normal period before you try to get pregnant. · If you do not want to get pregnant, ask your doctor about birth control. You can get pregnant again before your next period starts if you are not using birth control. · You may be low in iron because of blood loss. Eat a balanced diet that is high in iron and vitamin C. Foods rich in iron include red meat, shellfish, eggs, beans, and leafy green vegetables. Foods high in vitamin C include citrus fruits, tomatoes, and broccoli. Talk to your doctor about whether you need to take iron pills or a multivitamin. · The loss of a pregnancy can be very hard. You may wonder why it happened and blame yourself. Talking to family members, friends, a counselor, or your doctor may help you cope with your loss. When should you call for help? Call 911 anytime you think you may need emergency care. For example, call if:    · You passed out (lost consciousness).    Call your doctor now or seek immediate medical care if:    · You have severe vaginal bleeding.     · You are dizzy or lightheaded, or you feel like you may faint.     · You have new or worse pain in your belly or pelvis.     · You have a fever.     · You have vaginal discharge that smells bad.    Watch closely for changes in your health, and be sure to contact your doctor if:    · You do not get better as expected. Where can you learn more? Go to http://mary-alee.info/. Enter E802 in the search box to learn more about \"Miscarriage: Care Instructions. \"  Current as of: November 21, 2017  Content Version: 11.8  © 9697-6404 Healthwise, [employer].  Care instructions adapted under license by [employer] (which disclaims liability or warranty for this information). If you have questions about a medical condition or this instruction, always ask your healthcare professional. [address] any warranty or liability for your use of this information.

## 2018-11-08 NOTE — ED NOTES
Discharge instructions given to pt by MD. Pt  verbalizes understanding. Opportunity for questions provided. Pt ambulatory out of unit, in no acute distress and taken home by friend.

## 2019-01-30 ENCOUNTER — OFFICE VISIT (OUTPATIENT)
Dept: INTERNAL MEDICINE CLINIC | Age: 39
End: 2019-01-30

## 2019-01-30 VITALS
TEMPERATURE: 98.2 F | BODY MASS INDEX: 27.99 KG/M2 | HEIGHT: 65 IN | WEIGHT: 168 LBS | DIASTOLIC BLOOD PRESSURE: 82 MMHG | OXYGEN SATURATION: 100 % | SYSTOLIC BLOOD PRESSURE: 129 MMHG | RESPIRATION RATE: 16 BRPM | HEART RATE: 89 BPM

## 2019-01-30 DIAGNOSIS — G43.C0 PERIODIC HEADACHE SYNDROME, NOT INTRACTABLE: ICD-10-CM

## 2019-01-30 DIAGNOSIS — Z00.00 ROUTINE GENERAL MEDICAL EXAMINATION AT A HEALTH CARE FACILITY: Primary | ICD-10-CM

## 2019-01-30 RX ORDER — BUTALBITAL, ACETAMINOPHEN AND CAFFEINE 50; 325; 40 MG/1; MG/1; MG/1
1 TABLET ORAL
Qty: 30 TAB | Refills: 1 | Status: SHIPPED | OUTPATIENT
Start: 2019-01-30 | End: 2021-10-06 | Stop reason: SDUPTHER

## 2019-01-30 RX ORDER — ETONOGESTREL AND ETHINYL ESTRADIOL 11.7; 2.7 MG/1; MG/1
INSERT, EXTENDED RELEASE VAGINAL
COMMUNITY
End: 2019-03-29

## 2019-01-30 NOTE — PROGRESS NOTES
HISTORY OF PRESENT ILLNESS Dhruv Stallworth is a 41 y.o. female. HPI 
HA: Pt c/o migraine (1x/week). She recently experienced migraine and took friend's Fioricet. She tolerated Fioricet well and requests prescription. Mood: Pt reports stress from work and being first-time mother. She is considering f/u with therapist. She needs to find time to take care of herself Pt occasionally experiences CP. Review of Systems All other systems reviewed and are negative. Physical Exam  
Constitutional: She is oriented to person, place, and time. She appears well-developed and well-nourished. HENT:  
Head: Normocephalic and atraumatic. Right Ear: External ear normal.  
Left Ear: External ear normal.  
Nose: Nose normal.  
Mouth/Throat: Oropharynx is clear and moist.  
Eyes: Conjunctivae and EOM are normal.  
Neck: Normal range of motion. Neck supple. Carotid bruit is not present. No thyroid mass and no thyromegaly present. Cardiovascular: Normal rate, regular rhythm, S1 normal, S2 normal, normal heart sounds and intact distal pulses. Pulmonary/Chest: Effort normal and breath sounds normal.  
Abdominal: Soft. Normal appearance and bowel sounds are normal. There is no hepatosplenomegaly. There is no tenderness. Musculoskeletal: Normal range of motion. Neurological: She is alert and oriented to person, place, and time. She has normal strength. No cranial nerve deficit or sensory deficit. Coordination normal.  
Skin: Skin is warm, dry and intact. No abrasion and no rash noted. Psychiatric: She has a normal mood and affect. Her behavior is normal. Judgment and thought content normal.  
Nursing note and vitals reviewed. ASSESSMENT and PLAN Diagnoses and all orders for this visit: 1. Routine general medical examination at a health care facility 
-     CBC W/O DIFF 
-     LIPID PANEL 
-     METABOLIC PANEL, COMPREHENSIVE 2. Periodic headache syndrome, not intractable Prescribed Fioricet (PRN, ~1x/week). Will monitor for any changes or improvements. -     butalbital-acetaminophen-caffeine (FIORICET, ESGIC) -40 mg per tablet; Take 1 Tab by mouth every six (6) hours as needed for Pain. Additional Comments: Pt is considering f/u with therapist, to manage mood and stress. Lab results and schedule of future lab studies reviewed with patient. Reviewed diet, exercise and weight control. Written by Aundrea Bella, as dictated by Khloe Rey MD.  
 
Current diagnosis and concerns discussed with pt at length. Understands risks and benefits or current treatment plan and medications and accepts the treatment and medication with any possible risks. Pt asks appropriate questions which were answered. Pt instructed to call with any concerns or problems. This note will not be viewable in [address]

## 2019-02-15 LAB
ALBUMIN SERPL-MCNC: 3.8 G/DL (ref 3.5–5.5)
ALBUMIN/GLOB SERPL: 1 {RATIO} (ref 1.2–2.2)
ALP SERPL-CCNC: 110 IU/L (ref 39–117)
ALT SERPL-CCNC: 13 IU/L (ref 0–32)
AST SERPL-CCNC: 13 IU/L (ref 0–40)
BILIRUB SERPL-MCNC: <0.2 MG/DL (ref 0–1.2)
BUN SERPL-MCNC: 9 MG/DL (ref 6–20)
BUN/CREAT SERPL: 11 (ref 9–23)
CALCIUM SERPL-MCNC: 9.4 MG/DL (ref 8.7–10.2)
CHLORIDE SERPL-SCNC: 104 MMOL/L (ref 96–106)
CHOLEST SERPL-MCNC: 212 MG/DL (ref 100–199)
CO2 SERPL-SCNC: 21 MMOL/L (ref 20–29)
CREAT SERPL-MCNC: 0.82 MG/DL (ref 0.57–1)
ERYTHROCYTE [DISTWIDTH] IN BLOOD BY AUTOMATED COUNT: 15.4 % (ref 12.3–15.4)
GLOBULIN SER CALC-MCNC: 3.9 G/DL (ref 1.5–4.5)
GLUCOSE SERPL-MCNC: 81 MG/DL (ref 65–99)
HCT VFR BLD AUTO: 36.6 % (ref 34–46.6)
HDLC SERPL-MCNC: 74 MG/DL
HGB BLD-MCNC: 11.1 G/DL (ref 11.1–15.9)
INTERPRETATION, 910389: NORMAL
LDLC SERPL CALC-MCNC: 121 MG/DL (ref 0–99)
MCH RBC QN AUTO: 25.4 PG (ref 26.6–33)
MCHC RBC AUTO-ENTMCNC: 30.3 G/DL (ref 31.5–35.7)
MCV RBC AUTO: 84 FL (ref 79–97)
PLATELET # BLD AUTO: 417 X10E3/UL (ref 150–379)
POTASSIUM SERPL-SCNC: 4 MMOL/L (ref 3.5–5.2)
PROT SERPL-MCNC: 7.7 G/DL (ref 6–8.5)
RBC # BLD AUTO: 4.37 X10E6/UL (ref 3.77–5.28)
SODIUM SERPL-SCNC: 140 MMOL/L (ref 134–144)
TRIGL SERPL-MCNC: 86 MG/DL (ref 0–149)
VLDLC SERPL CALC-MCNC: 17 MG/DL (ref 5–40)
WBC # BLD AUTO: 8.6 X10E3/UL (ref 3.4–10.8)

## 2019-03-27 ENCOUNTER — TELEPHONE (OUTPATIENT)
Dept: INTERNAL MEDICINE CLINIC | Age: 39
End: 2019-03-27

## 2019-03-27 DIAGNOSIS — Z20.828 EXPOSURE TO THE FLU: Primary | ICD-10-CM

## 2019-03-27 RX ORDER — OSELTAMIVIR PHOSPHATE 75 MG/1
75 CAPSULE ORAL DAILY
Qty: 10 CAP | Refills: 0 | Status: SHIPPED | OUTPATIENT
Start: 2019-03-27 | End: 2019-03-29

## 2019-03-27 NOTE — TELEPHONE ENCOUNTER
Two co-workers is out with the flu. Patient has requested to have tamiflu called in. I will send to her pharmacy.

## 2019-03-29 ENCOUNTER — OFFICE VISIT (OUTPATIENT)
Dept: INTERNAL MEDICINE CLINIC | Age: 39
End: 2019-03-29

## 2019-03-29 VITALS
BODY MASS INDEX: 27.26 KG/M2 | RESPIRATION RATE: 18 BRPM | HEART RATE: 78 BPM | SYSTOLIC BLOOD PRESSURE: 157 MMHG | TEMPERATURE: 97.7 F | HEIGHT: 65 IN | OXYGEN SATURATION: 100 % | WEIGHT: 163.6 LBS | DIASTOLIC BLOOD PRESSURE: 97 MMHG

## 2019-03-29 DIAGNOSIS — G43.811 OTHER MIGRAINE WITH STATUS MIGRAINOSUS, INTRACTABLE: ICD-10-CM

## 2019-03-29 DIAGNOSIS — I10 ESSENTIAL HYPERTENSION: Primary | ICD-10-CM

## 2019-03-29 RX ORDER — HYDROCHLOROTHIAZIDE 25 MG/1
25 TABLET ORAL DAILY
Qty: 30 TAB | Refills: 3 | Status: SHIPPED | OUTPATIENT
Start: 2019-03-29 | End: 2019-04-17 | Stop reason: ALTCHOICE

## 2019-03-29 RX ORDER — TOPIRAMATE 25 MG/1
25 TABLET ORAL 2 TIMES DAILY
Qty: 60 TAB | Refills: 3 | Status: SHIPPED | OUTPATIENT
Start: 2019-03-29 | End: 2019-05-03

## 2019-03-29 NOTE — PROGRESS NOTES
HISTORY OF PRESENT ILLNESS Emiliano Garcia is a 41 y.o. female. HPI Migraine: Pt c/o worsening migraines. She continues on Fioricet. She reports that Propranolol and Topamax were effective in past. She requests to evaluate magnesium levels. Hypertension ROS: no TIA's, no chest pain on exertion, no dyspnea on exertion, no swelling of ankles. New concerns:  Patient's BP in office today is 157/97. OB removed Nuvaring last week, b/c suspected that elevated BP might be side effect. She now has IUD. Pt reports stress from work and boyfriend. She suspects that boyfriend might have mood disorder, b/c he lashes out sporadically. She notes that counseling isn't feasible at this time. Review of Systems Neurological: Positive for headaches. All other systems reviewed and are negative. Physical Exam  
Constitutional: She is oriented to person, place, and time. She appears well-developed and well-nourished. HENT:  
Head: Normocephalic and atraumatic. Right Ear: External ear normal.  
Left Ear: External ear normal.  
Nose: Nose normal.  
Mouth/Throat: Oropharynx is clear and moist.  
Eyes: Conjunctivae and EOM are normal.  
Neck: Normal range of motion. Neck supple. Carotid bruit is not present. No thyroid mass and no thyromegaly present. Cardiovascular: Normal rate, regular rhythm, S1 normal, S2 normal, normal heart sounds and intact distal pulses. Pulmonary/Chest: Effort normal and breath sounds normal.  
Abdominal: Soft. Normal appearance and bowel sounds are normal. There is no hepatosplenomegaly. There is no tenderness. Musculoskeletal: Normal range of motion. Neurological: She is alert and oriented to person, place, and time. She has normal strength. No cranial nerve deficit or sensory deficit. Coordination normal.  
Skin: Skin is warm, dry and intact. No abrasion and no rash noted. Psychiatric: She has a normal mood and affect.  Her behavior is normal. Judgment and thought content normal.  
Nursing note and vitals reviewed. ASSESSMENT and PLAN Diagnoses and all orders for this visit: 1. Essential hypertension BP elevated. Prescribed HCTZ. Discussed that elevated BP may be side effect of now-removed Nuvaring. Will monitor for lab results, especially magnesium levels. -     hydroCHLOROthiazide (HYDRODIURIL) 25 mg tablet; Take 1 Tab by mouth daily. 
-     METABOLIC PANEL, BASIC 
-     MAGNESIUM 2. Other migraine with status migrainosus, intractable Prescribed Topamax. Discussed that pt should start at 1 tablet/day and titrate up to 1 tablet 2x/day. Pt will also continue on Fioricet. -     topiramate (TOPAMAX) 25 mg tablet; Take 1 Tab by mouth two (2) times a day. -     MAGNESIUM Additional Comments: Discussed that pt should seriously consider removing herself from current situation with boyfriend. Lab results and schedule of future lab studies reviewed with patient. Reviewed diet, exercise and weight control. Written by Simran Pearl, as dictated by Laurence Santoyo MD.  
 
Current diagnosis and concerns discussed with pt at length. Understands risks and benefits or current treatment plan and medications and accepts the treatment and medication with any possible risks. Pt asks appropriate questions which were answered. Pt instructed to call with any concerns or problems. This note will not be viewable in [address]

## 2019-04-09 ENCOUNTER — APPOINTMENT (OUTPATIENT)
Dept: GENERAL RADIOLOGY | Age: 39
End: 2019-04-09
Attending: PHYSICIAN ASSISTANT
Payer: COMMERCIAL

## 2019-04-09 ENCOUNTER — HOSPITAL ENCOUNTER (EMERGENCY)
Age: 39
Discharge: HOME | End: 2019-04-09
Attending: STUDENT IN AN ORGANIZED HEALTH CARE EDUCATION/TRAINING PROGRAM
Payer: COMMERCIAL

## 2019-04-09 ENCOUNTER — TELEPHONE (OUTPATIENT)
Dept: INTERNAL MEDICINE CLINIC | Age: 39
End: 2019-04-09

## 2019-04-09 VITALS
OXYGEN SATURATION: 100 % | SYSTOLIC BLOOD PRESSURE: 154 MMHG | HEIGHT: 65 IN | RESPIRATION RATE: 14 BRPM | BODY MASS INDEX: 27.16 KG/M2 | HEART RATE: 91 BPM | DIASTOLIC BLOOD PRESSURE: 102 MMHG | TEMPERATURE: 98.4 F | WEIGHT: 163 LBS

## 2019-04-09 DIAGNOSIS — R07.89 CHEST TIGHTNESS: Primary | ICD-10-CM

## 2019-04-09 LAB
ALBUMIN SERPL-MCNC: 3.4 G/DL (ref 3.5–5)
ALBUMIN/GLOB SERPL: 0.7 {RATIO} (ref 1.1–2.2)
ALP SERPL-CCNC: 142 U/L (ref 45–117)
ALT SERPL-CCNC: 21 U/L (ref 12–78)
ANION GAP SERPL CALC-SCNC: 6 MMOL/L (ref 5–15)
AST SERPL-CCNC: 17 U/L (ref 15–37)
BASOPHILS # BLD: 0 K/UL (ref 0–0.1)
BASOPHILS NFR BLD: 0 % (ref 0–1)
BILIRUB SERPL-MCNC: 0.2 MG/DL (ref 0.2–1)
BUN SERPL-MCNC: 16 MG/DL (ref 6–20)
BUN/CREAT SERPL: 17 (ref 12–20)
CALCIUM SERPL-MCNC: 8.8 MG/DL (ref 8.5–10.1)
CHLORIDE SERPL-SCNC: 103 MMOL/L (ref 97–108)
CO2 SERPL-SCNC: 27 MMOL/L (ref 21–32)
COMMENT, HOLDF: NORMAL
CREAT SERPL-MCNC: 0.93 MG/DL (ref 0.55–1.02)
D DIMER PPP FEU-MCNC: 0.44 MG/L FEU (ref 0–0.65)
DIFFERENTIAL METHOD BLD: ABNORMAL
EOSINOPHIL # BLD: 0 K/UL (ref 0–0.4)
EOSINOPHIL NFR BLD: 0 % (ref 0–7)
ERYTHROCYTE [DISTWIDTH] IN BLOOD BY AUTOMATED COUNT: 15.7 % (ref 11.5–14.5)
GLOBULIN SER CALC-MCNC: 5 G/DL (ref 2–4)
GLUCOSE SERPL-MCNC: 85 MG/DL (ref 65–100)
HCG UR QL: NEGATIVE
HCT VFR BLD AUTO: 39.4 % (ref 35–47)
HGB BLD-MCNC: 12.4 G/DL (ref 11.5–16)
IMM GRANULOCYTES # BLD AUTO: 0 K/UL (ref 0–0.04)
IMM GRANULOCYTES NFR BLD AUTO: 0 % (ref 0–0.5)
LYMPHOCYTES # BLD: 3 K/UL (ref 0.8–3.5)
LYMPHOCYTES NFR BLD: 34 % (ref 12–49)
MCH RBC QN AUTO: 25.4 PG (ref 26–34)
MCHC RBC AUTO-ENTMCNC: 31.5 G/DL (ref 30–36.5)
MCV RBC AUTO: 80.6 FL (ref 80–99)
MONOCYTES # BLD: 0.9 K/UL (ref 0–1)
MONOCYTES NFR BLD: 10 % (ref 5–13)
NEUTS SEG # BLD: 5 K/UL (ref 1.8–8)
NEUTS SEG NFR BLD: 56 % (ref 32–75)
NRBC # BLD: 0 K/UL (ref 0–0.01)
NRBC BLD-RTO: 0 PER 100 WBC
PLATELET # BLD AUTO: 387 K/UL (ref 150–400)
PMV BLD AUTO: 9.7 FL (ref 8.9–12.9)
POTASSIUM SERPL-SCNC: 3.5 MMOL/L (ref 3.5–5.1)
PROT SERPL-MCNC: 8.4 G/DL (ref 6.4–8.2)
RBC # BLD AUTO: 4.89 M/UL (ref 3.8–5.2)
SAMPLES BEING HELD,HOLD: NORMAL
SODIUM SERPL-SCNC: 136 MMOL/L (ref 136–145)
TROPONIN I SERPL-MCNC: <0.05 NG/ML
TSH SERPL DL<=0.05 MIU/L-ACNC: 1.93 UIU/ML (ref 0.36–3.74)
WBC # BLD AUTO: 9 K/UL (ref 3.6–11)

## 2019-04-09 PROCEDURE — 93005 ELECTROCARDIOGRAM TRACING: CPT

## 2019-04-09 PROCEDURE — 85025 COMPLETE CBC W/AUTO DIFF WBC: CPT

## 2019-04-09 PROCEDURE — 99284 EMERGENCY DEPT VISIT MOD MDM: CPT

## 2019-04-09 PROCEDURE — 84443 ASSAY THYROID STIM HORMONE: CPT

## 2019-04-09 PROCEDURE — 96374 THER/PROPH/DIAG INJ IV PUSH: CPT

## 2019-04-09 PROCEDURE — 80053 COMPREHEN METABOLIC PANEL: CPT

## 2019-04-09 PROCEDURE — 77030032490 HC SLV COMPR SCD KNE COVD -B

## 2019-04-09 PROCEDURE — 77030020782 HC GWN BAIR PAWS FLX 3M -B

## 2019-04-09 PROCEDURE — 81025 URINE PREGNANCY TEST: CPT

## 2019-04-09 PROCEDURE — 85379 FIBRIN DEGRADATION QUANT: CPT

## 2019-04-09 PROCEDURE — 36415 COLL VENOUS BLD VENIPUNCTURE: CPT

## 2019-04-09 PROCEDURE — 84484 ASSAY OF TROPONIN QUANT: CPT

## 2019-04-09 PROCEDURE — 71046 X-RAY EXAM CHEST 2 VIEWS: CPT

## 2019-04-09 NOTE — DISCHARGE INSTRUCTIONS

## 2019-04-09 NOTE — TELEPHONE ENCOUNTER
Pt called complaining of dizziness, elevated HR with average of 110. BP stable but started with SOB upon exertion last p.m Pt advised to have eval in ED. Pt agrees.

## 2019-04-09 NOTE — ED TRIAGE NOTES
Pt reports \"chest tightness\", shortness of breath, and right shoulder/neck pain since this past Sunday. Pt denies recent air travel.
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
## 2019-04-10 LAB
ATRIAL RATE: 86 BPM
CALCULATED P AXIS, ECG09: 46 DEGREES
CALCULATED R AXIS, ECG10: 25 DEGREES
CALCULATED T AXIS, ECG11: 25 DEGREES
DIAGNOSIS, 93000: NORMAL
P-R INTERVAL, ECG05: 152 MS
Q-T INTERVAL, ECG07: 376 MS
QRS DURATION, ECG06: 84 MS
QTC CALCULATION (BEZET), ECG08: 449 MS
VENTRICULAR RATE, ECG03: 86 BPM

## 2019-04-17 ENCOUNTER — OFFICE VISIT (OUTPATIENT)
Dept: CARDIOLOGY CLINIC | Age: 39
End: 2019-04-17

## 2019-04-17 VITALS
SYSTOLIC BLOOD PRESSURE: 130 MMHG | HEART RATE: 94 BPM | BODY MASS INDEX: 26.59 KG/M2 | HEIGHT: 65 IN | DIASTOLIC BLOOD PRESSURE: 90 MMHG | WEIGHT: 159.6 LBS | OXYGEN SATURATION: 97 % | RESPIRATION RATE: 16 BRPM

## 2019-04-17 DIAGNOSIS — R07.9 CHEST PAIN, UNSPECIFIED TYPE: ICD-10-CM

## 2019-04-17 DIAGNOSIS — R00.0 TACHYCARDIA: Primary | ICD-10-CM

## 2019-04-17 RX ORDER — AMLODIPINE BESYLATE 2.5 MG/1
2.5 TABLET ORAL DAILY
Qty: 90 TAB | Refills: 3 | Status: SHIPPED | OUTPATIENT
Start: 2019-04-17 | End: 2019-05-03
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
## 2019-05-03 ENCOUNTER — OFFICE VISIT (OUTPATIENT)
Dept: INTERNAL MEDICINE CLINIC | Age: 39
End: 2019-05-03

## 2019-05-03 VITALS
HEART RATE: 84 BPM | BODY MASS INDEX: 26.49 KG/M2 | DIASTOLIC BLOOD PRESSURE: 88 MMHG | HEIGHT: 65 IN | WEIGHT: 159 LBS | TEMPERATURE: 97.8 F | SYSTOLIC BLOOD PRESSURE: 136 MMHG | OXYGEN SATURATION: 100 % | RESPIRATION RATE: 16 BRPM

## 2019-05-03 DIAGNOSIS — I10 ESSENTIAL HYPERTENSION: Primary | ICD-10-CM

## 2019-05-03 DIAGNOSIS — G43.811 OTHER MIGRAINE WITH STATUS MIGRAINOSUS, INTRACTABLE: ICD-10-CM

## 2019-05-03 RX ORDER — HYDROCHLOROTHIAZIDE 25 MG/1
25 TABLET ORAL DAILY
COMMUNITY
End: 2019-05-03

## 2019-05-03 RX ORDER — AMLODIPINE BESYLATE 5 MG/1
5 TABLET ORAL DAILY
Qty: 90 TAB | Refills: 1 | Status: SHIPPED | OUTPATIENT
Start: 2019-05-03 | End: 2019-09-06
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
## 2019-05-16 ENCOUNTER — TELEPHONE (OUTPATIENT)
Dept: INTERNAL MEDICINE CLINIC | Age: 39
End: 2019-05-16

## 2019-05-16 DIAGNOSIS — H00.014 HORDEOLUM EXTERNUM OF LEFT UPPER EYELID: Primary | ICD-10-CM

## 2019-05-16 RX ORDER — ERYTHROMYCIN 5 MG/G
OINTMENT OPHTHALMIC
Qty: 1 TUBE | Refills: 0 | Status: SHIPPED | OUTPATIENT
Start: 2019-05-16 | End: 2019-09-06 | Stop reason: ALTCHOICE

## 2019-05-16 NOTE — TELEPHONE ENCOUNTER
Patient states this am she woke up with some cold in her left eye. She reports she had tenderness of eye lash area top lid. She states it is slightly raised as well. I explained to the patient that she is developing a stye and should be treated. I will send erythromycin to the pharmacy but if not better, please see PCP.

## 2019-06-26 RX ORDER — ESCITALOPRAM OXALATE 10 MG/1
10 TABLET ORAL DAILY
Qty: 30 TAB | Refills: 3 | Status: SHIPPED | OUTPATIENT
Start: 2019-06-26 | End: 2019-09-06 | Stop reason: SDUPTHER

## 2019-09-05 ENCOUNTER — TELEPHONE (OUTPATIENT)
Dept: INTERNAL MEDICINE CLINIC | Age: 39
End: 2019-09-05

## 2019-09-05 DIAGNOSIS — N89.8 VAGINAL DISCHARGE: Primary | ICD-10-CM

## 2019-09-05 RX ORDER — METRONIDAZOLE 500 MG/1
500 TABLET ORAL 2 TIMES DAILY
Qty: 14 TAB | Refills: 0 | Status: SHIPPED | OUTPATIENT
Start: 2019-09-05 | End: 2020-09-15 | Stop reason: ALTCHOICE

## 2019-09-05 NOTE — TELEPHONE ENCOUNTER
Patient states she is having some vaginal discharge with odor. She reports it first started about 5 days ago. She reports she had a vaginal steam procedure done on august 23 rd. She reports she tried in hope that it would help her endometriosis pain. I will send flagyl to the pharmacy for her to take. She understands if she does not feel better she will need to see her gyn doctor.

## 2019-09-06 ENCOUNTER — OFFICE VISIT (OUTPATIENT)
Dept: INTERNAL MEDICINE CLINIC | Age: 39
End: 2019-09-06

## 2019-09-06 VITALS
HEIGHT: 65 IN | OXYGEN SATURATION: 97 % | WEIGHT: 158 LBS | SYSTOLIC BLOOD PRESSURE: 134 MMHG | BODY MASS INDEX: 26.33 KG/M2 | HEART RATE: 75 BPM | DIASTOLIC BLOOD PRESSURE: 84 MMHG | TEMPERATURE: 98.2 F | RESPIRATION RATE: 16 BRPM

## 2019-09-06 DIAGNOSIS — I10 ESSENTIAL HYPERTENSION: Primary | ICD-10-CM

## 2019-09-06 DIAGNOSIS — F41.9 ANXIETY: ICD-10-CM

## 2019-09-06 DIAGNOSIS — G43.811 OTHER MIGRAINE WITH STATUS MIGRAINOSUS, INTRACTABLE: ICD-10-CM

## 2019-09-06 RX ORDER — ESCITALOPRAM OXALATE 20 MG/1
20 TABLET ORAL DAILY
Qty: 90 TAB | Refills: 1 | Status: SHIPPED | OUTPATIENT
Start: 2019-09-06 | End: 2019-11-13

## 2019-09-06 RX ORDER — AMLODIPINE BESYLATE 10 MG/1
10 TABLET ORAL DAILY
Qty: 90 TAB | Refills: 1 | Status: SHIPPED | OUTPATIENT
Start: 2019-09-06 | End: 2020-02-27

## 2019-09-06 NOTE — PROGRESS NOTES
HISTORY OF PRESENT ILLNESS  Sacha Silverio is a 42 y.o. female. HPI  Mood: Pt continues on Lexapro. Pt reports that her uncle and sister passed away of relapsing breast cancer. She just buried her uncle and she is going down to [place] to bury her sister. She notes that she is under immense stress at work as well. She notes that she is very stressed and she has started to lose weight. Hypertension ROS: taking medications as instructed, no medication side effects noted, no TIA's, no chest pain on exertion, no dyspnea on exertion, no swelling of ankles. New concerns: BP in office today is 134/84. Pt continues on Amlodipine. HA: not taking anything right now just stressed and overwhelmed right now; just takes fioricet as needed    Review of Systems   Psychiatric/Behavioral: Positive for depression. The patient is nervous/anxious. All other systems reviewed and are negative. Physical Exam   Constitutional: She is oriented to person, place, and time. She appears well-developed and well-nourished. HENT:   Head: Normocephalic and atraumatic. Right Ear: External ear normal.   Left Ear: External ear normal.   Nose: Nose normal.   Mouth/Throat: Oropharynx is clear and moist.   Eyes: Pupils are equal, round, and reactive to light. Conjunctivae and EOM are normal.   Neck: Normal range of motion. Neck supple. Cardiovascular: Normal rate, regular rhythm, normal heart sounds and intact distal pulses. Pulmonary/Chest: Effort normal and breath sounds normal. Right breast exhibits no inverted nipple, no mass, no nipple discharge, no skin change and no tenderness. Left breast exhibits no inverted nipple, no mass, no nipple discharge, no skin change and no tenderness. No breast swelling, tenderness, discharge or bleeding. Breasts are symmetrical.   Abdominal: Soft. Bowel sounds are normal.   Genitourinary: Rectum normal and vagina normal. Rectal exam shows anal tone normal and guaiac negative stool.  No breast swelling, tenderness, discharge or bleeding. Musculoskeletal: Normal range of motion. Neurological: She is alert and oriented to person, place, and time. Skin: Skin is warm and dry. Psychiatric: She has a normal mood and affect. Her behavior is normal. Judgment and thought content normal.   Nursing note and vitals reviewed. ASSESSMENT and PLAN  Diagnoses and all orders for this visit:    1. Essential hypertension  BP is at goal with Amlodipine. I do not recommend any change in medications. -     amLODIPine (NORVASC) 10 mg tablet; Take 1 Tab by mouth daily. 2. Other migraine with status migrainosus, intractable  Stable and well-managed with Fioricet. No change in medications. 3. Anxiety  Increased Lexapro from 10 mg to 20 mg due to increased stress. Discussed with pt that her decrease in appetite could be due to her increased anxiety. Advised pt to consider counseling to talk about her issues with a professional. Explained to pt that exercise or mediatation could be natural ways to improve her mood as well. Will continue to monitor for improvements or changes. Advised pt to f/u in 2 months. -     escitalopram oxalate (LEXAPRO) 20 mg tablet; Take 1 Tab by mouth daily. Lab results and schedule of future lab studies reviewed with patient. Reviewed diet, exercise and weight control. Written by Rashid Rueda, as dictated by Aly Chen MD.     Current diagnosis and concerns discussed with pt at length. Understands risks and benefits or current treatment plan and medications and accepts the treatment and medication with any possible risks. Pt asks appropriate questions which were answered. Pt instructed to call with any concerns or problems. This note will not be viewable in [address]

## 2019-10-28 ENCOUNTER — OFFICE VISIT (OUTPATIENT)
Dept: INTERNAL MEDICINE CLINIC | Age: 39
End: 2019-10-28

## 2019-10-28 VITALS
HEART RATE: 75 BPM | DIASTOLIC BLOOD PRESSURE: 70 MMHG | RESPIRATION RATE: 20 BRPM | WEIGHT: 159 LBS | HEIGHT: 65 IN | OXYGEN SATURATION: 99 % | SYSTOLIC BLOOD PRESSURE: 135 MMHG | BODY MASS INDEX: 26.49 KG/M2 | TEMPERATURE: 98.1 F

## 2019-10-28 DIAGNOSIS — R68.89 FLU-LIKE SYMPTOMS: Primary | ICD-10-CM

## 2019-10-28 LAB
BILIRUB UR QL STRIP: NEGATIVE
FLUAV+FLUBV AG NOSE QL IA.RAPID: NEGATIVE POS/NEG
FLUAV+FLUBV AG NOSE QL IA.RAPID: NEGATIVE POS/NEG
GLUCOSE UR-MCNC: NEGATIVE MG/DL
KETONES P FAST UR STRIP-MCNC: NEGATIVE MG/DL
PH UR STRIP: 6 [PH] (ref 4.6–8)
PROT UR QL STRIP: NORMAL
SP GR UR STRIP: 1.02 (ref 1–1.03)
UA UROBILINOGEN AMB POC: NORMAL (ref 0.2–1)
URINALYSIS CLARITY POC: CLEAR
URINALYSIS COLOR POC: YELLOW
URINE BLOOD POC: NEGATIVE
URINE LEUKOCYTES POC: NEGATIVE
URINE NITRITES POC: NEGATIVE
VALID INTERNAL CONTROL?: YES

## 2019-10-28 NOTE — PROGRESS NOTES
Chief Complaint   Patient presents with    Flu Like Symptoms     she is a 42y.o. year old female who presents to the office for cold symptoms. Patient reports her symptoms started Tuesday with nausea and lightheaded. She reports she is still feeling that with muscle aches. No fever, no chills. Mild cough non productive. Urinating okay. She had her IUD removed Thursday. At first thought maybe related to that but still not feeling better. She has not been drinking a lot of fluids. She states her lexapro was increased and she is not sure if this is related. Reviewed PmHx, RxHx, FmHx, SocHx, AllgHx and updated and dated in the chart. Review of Systems - negative except as listed above    Objective:     Vitals:    10/28/19 1216   BP: 135/70   Pulse: 75   Resp: 20   Temp: 98.1 °F (36.7 °C)   TempSrc: Oral   SpO2: 99%   Weight: 159 lb (72.1 kg)   Height: 5' 5\" (1.651 m)     Physical Examination: General appearance - alert, well appearing, and in no distress  Ears - bilateral TM's and external ear canals normal  Mouth - mild erythema  Neck - supple, no significant adenopathy  Chest - clear to auscultation, no wheezes, rales or rhonchi, symmetric air entry  Heart - normal rate and regular rhythm, no murmurs noted  Abdomen - soft, mild tenderness left lower quadrant, nondistended, no masses or organomegaly  Extremities - no pedal edema noted, no tenderness with palpation of the legs. Assessment/ Plan:   Diagnoses and all orders for this visit:    1. Flu-like symptoms  -     AMB POC INDIO INFLUENZA A/B TEST  -     CBC WITH AUTOMATED DIFF  -     METABOLIC PANEL, COMPREHENSIVE  -     AMB POC URINALYSIS DIP STICK MANUAL W/O MICRO    patient with negative flu and pregnancy test today. I would like for the patient to get labs done. I have discussed the diagnosis with the patient and the intended plan as seen in the above orders.   The patient has received an after-visit summary and questions were answered concerning future plans.      Medication Side Effects and Warnings were discussed with patient: yes  Patient Labs were reviewed and or requested: no  Patient Past Records were reviewed and or requested  yes    Tamica Posada PA-C

## 2019-11-13 ENCOUNTER — TELEPHONE (OUTPATIENT)
Dept: INTERNAL MEDICINE CLINIC | Age: 39
End: 2019-11-13

## 2019-11-13 RX ORDER — AZITHROMYCIN 250 MG/1
TABLET, FILM COATED ORAL
Qty: 6 TAB | Refills: 0 | Status: SHIPPED | OUTPATIENT
Start: 2019-11-13 | End: 2019-12-03 | Stop reason: ALTCHOICE

## 2019-11-13 NOTE — TELEPHONE ENCOUNTER
Patient reports she is not getting better. Her cough is worse. She was seen at [facility]. Was being treated symptomatically because it was thought to be viral. Will send medication to the pharmacy.

## 2019-12-03 ENCOUNTER — TELEPHONE (OUTPATIENT)
Dept: INTERNAL MEDICINE CLINIC | Age: 39
End: 2019-12-03

## 2019-12-03 DIAGNOSIS — B37.31 VAGINAL YEAST INFECTION: Primary | ICD-10-CM

## 2019-12-03 RX ORDER — NYSTATIN AND TRIAMCINOLONE ACETONIDE 100000; 1 [USP'U]/G; MG/G
CREAM TOPICAL 2 TIMES DAILY
Qty: 30 G | Refills: 0 | Status: SHIPPED | OUTPATIENT
Start: 2019-12-03 | End: 2021-05-07

## 2019-12-03 RX ORDER — FLUCONAZOLE 150 MG/1
150 TABLET ORAL DAILY
Qty: 1 TAB | Refills: 1 | Status: SHIPPED | OUTPATIENT
Start: 2019-12-03 | End: 2019-12-04

## 2019-12-03 NOTE — TELEPHONE ENCOUNTER
Patient reports she was recently on antibiotic therapy. She now has a vaginal yeast infection with some irritation of the external area as well.  Medication will be sent to the pharmacy but if not better she will need to see her doctor

## 2020-01-30 ENCOUNTER — PATIENT OUTREACH (OUTPATIENT)
Dept: OTHER | Age: 40
End: 2020-01-30

## 2020-01-30 NOTE — PROGRESS NOTES
Patient on report as eligible for Case Management. Left discreet message on voicemail with this CM contact information. Will attempt to contact again to offer [phone] Management services.

## 2020-02-06 ENCOUNTER — PATIENT OUTREACH (OUTPATIENT)
Dept: OTHER | Age: 40
End: 2020-02-06

## 2020-02-27 DIAGNOSIS — I10 ESSENTIAL HYPERTENSION: ICD-10-CM

## 2020-02-27 DIAGNOSIS — F41.9 ANXIETY: ICD-10-CM

## 2020-02-27 RX ORDER — ESCITALOPRAM OXALATE 20 MG/1
TABLET ORAL
Qty: 90 TAB | Refills: 0 | Status: SHIPPED | OUTPATIENT
Start: 2020-02-27 | End: 2020-05-27

## 2020-02-27 RX ORDER — AMLODIPINE BESYLATE 10 MG/1
TABLET ORAL
Qty: 90 TAB | Refills: 0 | Status: SHIPPED | OUTPATIENT
Start: 2020-02-27 | End: 2020-05-27

## 2020-03-05 ENCOUNTER — TELEPHONE (OUTPATIENT)
Dept: INTERNAL MEDICINE CLINIC | Age: 40
End: 2020-03-05

## 2020-03-05 DIAGNOSIS — J11.1 INFLUENZA: Primary | ICD-10-CM

## 2020-03-05 DIAGNOSIS — Z20.828 EXPOSURE TO THE FLU: ICD-10-CM

## 2020-03-05 RX ORDER — OSELTAMIVIR PHOSPHATE 75 MG/1
75 CAPSULE ORAL 2 TIMES DAILY
Qty: 10 CAP | Refills: 0 | Status: SHIPPED | OUTPATIENT
Start: 2020-03-05 | End: 2020-03-10

## 2020-03-12 ENCOUNTER — PATIENT OUTREACH (OUTPATIENT)
Dept: OTHER | Age: 40
End: 2020-03-12

## 2020-03-12 NOTE — LETTER
3/12/2020 2:29 PM 
 
Ms. Blaine Colby [address] [address] Dear Ms. Blaine Colby, My name is Li Cloud, Associate Care Manager for [facility] and I have been trying to reach you. The Associate Care Management (ACM) program is a free-of-charge confidential service provided to our associates and their family members covered by the [facility]. The program will provide an associate and his/her family with the [address] expertise to assist in navigating the health care delivery system, provider services, and their overall care needsso as to assure and improve health care interactions and enhance the quality of life. This program is designed to provide you with the opportunity to have a [facility] partner with you for the following services: 
 
 1) when you come home from the hospital or emergency room 2) when help is needed to manage your disease 3) when you need assistance coordinating services or appointments [address] [place] [address] is dedicated to empowering the good health of its community and improving the quality of care and care experiences for associates and their families. We are committed to safeguarding patient confidentiality and privacy, assuring that every associate has the respect he or she deserves in managing their health. The information shared with your care manager will not be shared with anyone else aside from those you identify as part of your care team, and will only be used to assist you with any identified care needs. Please contact me if you would like this service provided to you. Sincerely, LUIS Freeman RN  Employee Care Manager [address] [place] [address], [address] [place], [place] [address] [facility] Cell [phone] Fax [email or web address]

## 2020-03-24 ENCOUNTER — VIRTUAL VISIT (OUTPATIENT)
Dept: INTERNAL MEDICINE CLINIC | Age: 40
End: 2020-03-24

## 2020-04-07 ENCOUNTER — TELEPHONE (OUTPATIENT)
Dept: INTERNAL MEDICINE CLINIC | Age: 40
End: 2020-04-07

## 2020-04-07 RX ORDER — VALACYCLOVIR HYDROCHLORIDE 500 MG/1
500 TABLET, FILM COATED ORAL 2 TIMES DAILY
Qty: 14 TAB | Refills: 3 | Status: SHIPPED | OUTPATIENT
Start: 2020-04-07 | End: 2022-07-22 | Stop reason: SDUPTHER

## 2020-05-27 DIAGNOSIS — F41.9 ANXIETY: ICD-10-CM

## 2020-05-27 DIAGNOSIS — I10 ESSENTIAL HYPERTENSION: ICD-10-CM

## 2020-05-27 RX ORDER — ESCITALOPRAM OXALATE 20 MG/1
TABLET ORAL
Qty: 90 TAB | Refills: 0 | Status: SHIPPED | OUTPATIENT
Start: 2020-05-27 | End: 2020-09-15 | Stop reason: ALTCHOICE

## 2020-05-27 RX ORDER — AMLODIPINE BESYLATE 10 MG/1
TABLET ORAL
Qty: 90 TAB | Refills: 0 | Status: SHIPPED | OUTPATIENT
Start: 2020-05-27 | End: 2022-03-07 | Stop reason: SDUPTHER

## 2020-07-31 ENCOUNTER — TELEPHONE (OUTPATIENT)
Dept: INTERNAL MEDICINE CLINIC | Age: 40
End: 2020-07-31

## 2020-07-31 DIAGNOSIS — B35.3 TINEA PEDIS OF RIGHT FOOT: Primary | ICD-10-CM

## 2020-07-31 RX ORDER — CLOTRIMAZOLE AND BETAMETHASONE DIPROPIONATE 10; .64 MG/G; MG/G
CREAM TOPICAL 2 TIMES DAILY
Qty: 30 G | Refills: 0 | Status: CANCELLED | OUTPATIENT
Start: 2020-07-31

## 2020-07-31 RX ORDER — CLOTRIMAZOLE AND BETAMETHASONE DIPROPIONATE 10; .64 MG/G; MG/G
CREAM TOPICAL 2 TIMES DAILY
Qty: 15 G | Refills: 1 | Status: SHIPPED | OUTPATIENT
Start: 2020-07-31 | End: 2020-09-15 | Stop reason: ALTCHOICE

## 2020-07-31 NOTE — TELEPHONE ENCOUNTER
Patient reports she has a fungal infection between the 5 th, 4th and 3rd toe on the right foot. She has been using an over the counter spray.   Antifungal cream will be sent to the pharmacy

## 2020-08-22 ENCOUNTER — PATIENT MESSAGE (OUTPATIENT)
Dept: OBGYN CLINIC | Age: 40
End: 2020-08-22

## 2020-09-15 ENCOUNTER — OFFICE VISIT (OUTPATIENT)
Dept: INTERNAL MEDICINE CLINIC | Age: 40
End: 2020-09-15
Payer: COMMERCIAL

## 2020-09-15 VITALS
HEIGHT: 65 IN | DIASTOLIC BLOOD PRESSURE: 86 MMHG | WEIGHT: 163 LBS | SYSTOLIC BLOOD PRESSURE: 135 MMHG | TEMPERATURE: 98.1 F | OXYGEN SATURATION: 100 % | BODY MASS INDEX: 27.16 KG/M2 | RESPIRATION RATE: 20 BRPM | HEART RATE: 98 BPM

## 2020-09-15 DIAGNOSIS — Z00.00 ANNUAL PHYSICAL EXAM: Primary | ICD-10-CM

## 2020-09-15 DIAGNOSIS — Z13.220 SCREENING CHOLESTEROL LEVEL: ICD-10-CM

## 2020-09-15 DIAGNOSIS — B35.3 TINEA PEDIS OF RIGHT FOOT: ICD-10-CM

## 2020-09-15 DIAGNOSIS — I10 ESSENTIAL HYPERTENSION: ICD-10-CM

## 2020-09-15 PROCEDURE — 99395 PREV VISIT EST AGE 18-39: CPT | Performed by: PHYSICIAN ASSISTANT

## 2020-09-15 RX ORDER — ESTRADIOL 1 MG/1
1 TABLET ORAL
COMMUNITY
Start: 2020-08-25 | End: 2021-05-07

## 2020-09-15 RX ORDER — MEDROXYPROGESTERONE ACETATE 150 MG/ML
INJECTION, SUSPENSION INTRAMUSCULAR
COMMUNITY
Start: 2020-08-13 | End: 2022-07-22 | Stop reason: SDUPTHER

## 2020-09-15 RX ORDER — CLOTRIMAZOLE AND BETAMETHASONE DIPROPIONATE 10; .64 MG/G; MG/G
CREAM TOPICAL 2 TIMES DAILY
Qty: 30 G | Refills: 1 | Status: SHIPPED | OUTPATIENT
Start: 2020-09-15 | End: 2021-05-07

## 2020-09-16 DIAGNOSIS — B35.3 TINEA PEDIS OF RIGHT FOOT: ICD-10-CM

## 2020-09-16 DIAGNOSIS — I10 ESSENTIAL HYPERTENSION: ICD-10-CM

## 2020-09-16 DIAGNOSIS — Z13.220 SCREENING CHOLESTEROL LEVEL: ICD-10-CM

## 2020-09-16 LAB
ALBUMIN SERPL-MCNC: 3.5 G/DL (ref 3.5–5)
ALBUMIN/GLOB SERPL: 0.8 {RATIO} (ref 1.1–2.2)
ALP SERPL-CCNC: 127 U/L (ref 45–117)
ALT SERPL-CCNC: 20 U/L (ref 12–78)
ANION GAP SERPL CALC-SCNC: 4 MMOL/L (ref 5–15)
AST SERPL-CCNC: 13 U/L (ref 15–37)
BASOPHILS # BLD: 0.1 K/UL (ref 0–0.1)
BASOPHILS NFR BLD: 1 % (ref 0–1)
BILIRUB SERPL-MCNC: 0.3 MG/DL (ref 0.2–1)
BUN SERPL-MCNC: 9 MG/DL (ref 6–20)
BUN/CREAT SERPL: 10 (ref 12–20)
CALCIUM SERPL-MCNC: 9.4 MG/DL (ref 8.5–10.1)
CHLORIDE SERPL-SCNC: 107 MMOL/L (ref 97–108)
CHOLEST SERPL-MCNC: 205 MG/DL
CO2 SERPL-SCNC: 27 MMOL/L (ref 21–32)
CREAT SERPL-MCNC: 0.94 MG/DL (ref 0.55–1.02)
DIFFERENTIAL METHOD BLD: NORMAL
EOSINOPHIL # BLD: 0.1 K/UL (ref 0–0.4)
EOSINOPHIL NFR BLD: 1 % (ref 0–7)
ERYTHROCYTE [DISTWIDTH] IN BLOOD BY AUTOMATED COUNT: 13.9 % (ref 11.5–14.5)
GLOBULIN SER CALC-MCNC: 4.5 G/DL (ref 2–4)
GLUCOSE SERPL-MCNC: 86 MG/DL (ref 65–100)
HCT VFR BLD AUTO: 40.4 % (ref 35–47)
HDLC SERPL-MCNC: 61 MG/DL
HDLC SERPL: 3.4 {RATIO} (ref 0–5)
HGB BLD-MCNC: 13 G/DL (ref 11.5–16)
IMM GRANULOCYTES # BLD AUTO: 0 K/UL (ref 0–0.04)
IMM GRANULOCYTES NFR BLD AUTO: 0 % (ref 0–0.5)
LDLC SERPL CALC-MCNC: 132 MG/DL (ref 0–100)
LIPID PROFILE,FLP: ABNORMAL
LYMPHOCYTES # BLD: 1.8 K/UL (ref 0.8–3.5)
LYMPHOCYTES NFR BLD: 24 % (ref 12–49)
MCH RBC QN AUTO: 28.5 PG (ref 26–34)
MCHC RBC AUTO-ENTMCNC: 32.2 G/DL (ref 30–36.5)
MCV RBC AUTO: 88.6 FL (ref 80–99)
MONOCYTES # BLD: 0.6 K/UL (ref 0–1)
MONOCYTES NFR BLD: 8 % (ref 5–13)
NEUTS SEG # BLD: 5 K/UL (ref 1.8–8)
NEUTS SEG NFR BLD: 66 % (ref 32–75)
NRBC # BLD: 0 K/UL (ref 0–0.01)
NRBC BLD-RTO: 0 PER 100 WBC
PLATELET # BLD AUTO: 349 K/UL (ref 150–400)
PMV BLD AUTO: 10 FL (ref 8.9–12.9)
POTASSIUM SERPL-SCNC: 4.2 MMOL/L (ref 3.5–5.1)
PROT SERPL-MCNC: 8 G/DL (ref 6.4–8.2)
RBC # BLD AUTO: 4.56 M/UL (ref 3.8–5.2)
SODIUM SERPL-SCNC: 138 MMOL/L (ref 136–145)
TRIGL SERPL-MCNC: 60 MG/DL (ref ?–150)
VLDLC SERPL CALC-MCNC: 12 MG/DL
WBC # BLD AUTO: 7.6 K/UL (ref 3.6–11)

## 2020-09-16 NOTE — PROGRESS NOTES
Subjective:   47 y.o. female for Well Woman Check. Her gyne and breast care is done elsewhere by her Ob-Gyne physician. Patient Active Problem List    Diagnosis Date Noted    Pregnancy 04/02/2018    Tachycardia 03/18/2014    Endometriosis 02/12/2014    Family history of cystic fibrosis 10/01/2013    Menorrhagia 10/01/2013    Dysmenorrhea 10/01/2013    Migraine     Hypertension      Current Outpatient Medications   Medication Sig Dispense Refill    clotrimazole-betamethasone (LOTRISONE) topical cream Apply  to affected area two (2) times a day. 30 g 1    amLODIPine (NORVASC) 10 mg tablet TAKE 1 TABLET BY MOUTH EVERY DAY 90 Tab 0    valACYclovir (VALTREX) 500 mg tablet Take 1 Tab by mouth two (2) times a day. (Patient taking differently: Take 500 mg by mouth two (2) times daily as needed.) 14 Tab 3    nystatin-triamcinolone (MYCOLOG II) topical cream Apply  to affected area two (2) times a day. 30 g 0    butalbital-acetaminophen-caffeine (FIORICET, ESGIC) -40 mg per tablet Take 1 Tab by mouth every six (6) hours as needed for Pain. 30 Tab 1    estradioL (ESTRACE) 1 mg tablet Take 1 Tab by mouth every seven (7) days.  medroxyPROGESTERone (DEPO-PROVERA) 150 mg/mL syrg       amitriptyline (ELAVIL) 10 mg tablet Take 1 tablet by mouth nightly for 30 days. Appt needed prior to more refills 30 tablet 5     Allergies   Allergen Reactions    Apple Nausea Only    Nitrofurantoin Diarrhea    Penicillins Other (comments)     Pt reported \"sick\". 12/26/14 0738 \"ok with ancef/keflex\"             ROS: Feeling generally well. No TIA's or unusual headaches, no dysphagia. No prolonged cough. No dyspnea or chest pain on exertion. No abdominal pain, change in bowel habits, black or bloody stools. No urinary tract symptoms. No new or unusual musculoskeletal symptoms. Specific concerns today: patient reports she will need to get her annual wellness.   She also would like to get a refill for her tinea pedis. She states it is doing better but now totally resolved. Objective: The patient appears well, alert, oriented x 3, in no distress. Visit Vitals  /86   Pulse 98   Temp 98.1 °F (36.7 °C) (Oral)   Resp 20   Ht 5' 5\" (1.651 m)   Wt 163 lb (73.9 kg)   SpO2 100%   BMI 27.12 kg/m²     ENT normal.  Neck supple. No adenopathy or thyromegaly. JC. Lungs are clear, good air entry, no wheezes, rhonchi or rales. S1 and S2 normal, no murmurs, regular rate and rhythm. Abdomen soft without tenderness, guarding, mass or organomegaly. Extremities show no edema, normal peripheral pulses. Neurological is normal, no focal findings. Skin- right foot. Between toes 5 and 4 some mild flaking but improved. No erythema noted. Breast and Pelvic exams are deferred. Assessment/Plan:   Well Woman  increase physical activity, follow low fat diet, follow low salt diet, routine labs ordered  Diagnoses and all orders for this visit:    1. Annual physical exam  -     LIPID PANEL; Future  -     METABOLIC PANEL, COMPREHENSIVE; Future  -     CBC WITH AUTOMATED DIFF; Future    2. Essential hypertension  -     METABOLIC PANEL, COMPREHENSIVE; Future  -     CBC WITH AUTOMATED DIFF; Future    3. Tinea pedis of right foot  -     METABOLIC PANEL, COMPREHENSIVE; Future  -     CBC WITH AUTOMATED DIFF; Future  -     clotrimazole-betamethasone (LOTRISONE) topical cream; Apply  to affected area two (2) times a day. 4. Screening cholesterol level  -     LIPID PANEL; Future    patient to get labs done. I will fill out her paperwork once back. Advised patient she should try to increase physical activity and watch diet. Refill done for her cream.  She should follow up with her gyn at regular scheduled time. She will get her flu vaccine in October.

## 2020-11-09 ENCOUNTER — OFFICE VISIT (OUTPATIENT)
Dept: INTERNAL MEDICINE CLINIC | Age: 40
End: 2020-11-09
Payer: COMMERCIAL

## 2020-11-09 DIAGNOSIS — Z20.822 ENCOUNTER FOR SCREENING LABORATORY TESTING FOR COVID-19 VIRUS IN ASYMPTOMATIC PATIENT: Primary | ICD-10-CM

## 2020-11-09 PROCEDURE — 99212 OFFICE O/P EST SF 10 MIN: CPT | Performed by: PHYSICIAN ASSISTANT

## 2020-11-09 NOTE — PROGRESS NOTES
No chief complaint on file. she is a 47y.o. year old female who presents for evaluation. Patient presents to the office for COVID-19 testing. 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 She reports that she is not having any symptoms but because she lives with her stepfather that is immunocompromised she would like to get tested. Reviewed PmHx, RxHx, FmHx, SocHx, AllgHx and updated and dated in the chart. Review of Systems - negative except as listed above    Objective: There were no vitals filed for this visit. Physical Examination: General appearance - alert, well appearing, and in no distress  Mental status - normal mood, behavior, speech, dress, motor activity, and thought processes    Assessment/ Plan:   Diagnoses and all orders for this visit:    1. Encounter for screening laboratory testing for COVID-19 virus in asymptomatic patient  -     NOVEL CORONAVIRUS (COVID-19); Future           I have discussed the diagnosis with the patient and the intended plan as seen in the above orders. The patient has received an after-visit summary and questions were answered concerning future plans.      Medication Side Effects and Warnings were discussed with patient: n/a  Patient Labs were reviewed and or requested: n/a  Patient Past Records were reviewed and or requested  yes    Tamica Posada PA-C

## 2020-11-11 LAB — SARS-COV-2, NAA: NOT DETECTED

## 2020-12-04 NOTE — PATIENT INSTRUCTIONS
Abnormal Uterine Bleeding: Care Instructions  Your Care Instructions     Abnormal uterine bleeding is irregular bleeding from the uterus that is longer or heavier than usual or does not occur at your regular time. Sometimes it is caused by changes in hormone levels. It can also be caused by growths in the uterus, such as fibroids or polyps. Sometimes a cause cannot be found. You may have heavy bleeding when you are not expecting your period. Your doctor may suggest a pregnancy test, if you think you are pregnant. Follow-up care is a key part of your treatment and safety. Be sure to make and go to all appointments, and call your doctor if you are having problems. It's also a good idea to know your test results and keep a list of the medicines you take. How can you care for yourself at home? · Be safe with medicines. Take pain medicines exactly as directed. ? If the doctor gave you a prescription medicine for pain, take it as prescribed. ? If you are not taking a prescription pain medicine, ask your doctor if you can take an over-the-counter medicine. · You may be low in iron because of blood loss. Eat a balanced diet that is high in iron and vitamin C. Foods rich in iron include red meat, shellfish, eggs, beans, and leafy green vegetables. Talk to your doctor about whether you need to take iron pills or a multivitamin. When should you call for help? Call 911 anytime you think you may need emergency care. For example, call if:    · You passed out (lost consciousness). Call your doctor now or seek immediate medical care if:    · You have new or worse belly or pelvic pain.     · You have severe vaginal bleeding.     · You feel dizzy or lightheaded, or you feel like you may faint. Watch closely for changes in your health, and be sure to contact your doctor if:    · You think you may be pregnant.     · Your bleeding gets worse.     · You do not get better as expected. Where can you learn more?   Go to http://www.gray.com/  Enter [id number] in the search box to learn more about \"Abnormal Uterine Bleeding: Care Instructions. \"  Current as of: November 8, 2019               Content Version: 12.6  © 6231-6406 [employer]. Care instructions adapted under license by [employer] (which disclaims liability or warranty for this information). If you have questions about a medical condition or this instruction, always ask your healthcare professional. [address] any warranty or liability for your use of this information.

## 2020-12-08 ENCOUNTER — OFFICE VISIT (OUTPATIENT)
Dept: OBGYN CLINIC | Age: 40
End: 2020-12-08
Payer: COMMERCIAL

## 2020-12-08 VITALS
DIASTOLIC BLOOD PRESSURE: 92 MMHG | HEIGHT: 65 IN | SYSTOLIC BLOOD PRESSURE: 144 MMHG | WEIGHT: 167 LBS | BODY MASS INDEX: 27.82 KG/M2

## 2020-12-08 DIAGNOSIS — N80.9 ENDOMETRIOSIS: ICD-10-CM

## 2020-12-08 DIAGNOSIS — N94.10 DYSPAREUNIA IN FEMALE: ICD-10-CM

## 2020-12-08 DIAGNOSIS — N93.9 ABNORMAL UTERINE BLEEDING: Primary | ICD-10-CM

## 2020-12-08 DIAGNOSIS — R03.0 ELEVATED BLOOD PRESSURE READING: ICD-10-CM

## 2020-12-08 PROCEDURE — 99203 OFFICE O/P NEW LOW 30 MIN: CPT | Performed by: OBSTETRICS & GYNECOLOGY
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
## 2020-12-11 LAB
A VAGINAE DNA VAG QL NAA+PROBE: NORMAL SCORE
BVAB2 DNA VAG QL NAA+PROBE: NORMAL SCORE
C ALBICANS DNA VAG QL NAA+PROBE: NEGATIVE
C GLABRATA DNA VAG QL NAA+PROBE: NEGATIVE
C TRACH DNA VAG QL NAA+PROBE: NEGATIVE
MEGA1 DNA VAG QL NAA+PROBE: NORMAL SCORE
N GONORRHOEA DNA VAG QL NAA+PROBE: NEGATIVE
T VAGINALIS DNA VAG QL NAA+PROBE: NEGATIVE

## 2021-01-20 ENCOUNTER — OFFICE VISIT (OUTPATIENT)
Dept: OBGYN CLINIC | Age: 41
End: 2021-01-20
Payer: COMMERCIAL

## 2021-01-20 VITALS
DIASTOLIC BLOOD PRESSURE: 90 MMHG | WEIGHT: 165.6 LBS | HEART RATE: 101 BPM | SYSTOLIC BLOOD PRESSURE: 138 MMHG | BODY MASS INDEX: 27.59 KG/M2 | HEIGHT: 65 IN

## 2021-01-20 DIAGNOSIS — N93.9 ABNORMAL UTERINE BLEEDING: Primary | ICD-10-CM

## 2021-01-20 DIAGNOSIS — A60.00 GENITAL HERPES SIMPLEX, UNSPECIFIED SITE: ICD-10-CM

## 2021-01-20 DIAGNOSIS — R03.0 ELEVATED BLOOD PRESSURE READING: ICD-10-CM

## 2021-01-20 LAB
HCG URINE, QL. (POC): NEGATIVE
VALID INTERNAL CONTROL?: YES

## 2021-01-20 PROCEDURE — 99213 OFFICE O/P EST LOW 20 MIN: CPT | Performed by: OBSTETRICS & GYNECOLOGY

## 2021-01-20 PROCEDURE — 81025 URINE PREGNANCY TEST: CPT | Performed by: OBSTETRICS & GYNECOLOGY

## 2021-01-20 RX ORDER — MEDROXYPROGESTERONE ACETATE 150 MG/ML
150 INJECTION, SUSPENSION INTRAMUSCULAR ONCE
Qty: 1 ML | Refills: 0 | Status: SHIPPED | OUTPATIENT
Start: 2021-01-20 | End: 2021-01-20

## 2021-01-20 RX ORDER — VALACYCLOVIR HYDROCHLORIDE 500 MG/1
500 TABLET, FILM COATED ORAL DAILY
Qty: 90 TAB | Refills: 0 | Status: SHIPPED | OUTPATIENT
Start: 2021-01-20 | End: 2021-01-21 | Stop reason: SDUPTHER
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
## 2021-01-21 RX ORDER — VALACYCLOVIR HYDROCHLORIDE 500 MG/1
500 TABLET, FILM COATED ORAL DAILY
Qty: 90 TAB | Refills: 4 | Status: SHIPPED | OUTPATIENT
Start: 2021-01-21 | End: 2021-04-21

## 2021-02-23 ENCOUNTER — TELEPHONE (OUTPATIENT)
Dept: OBGYN CLINIC | Age: 41
End: 2021-02-23

## 2021-02-23 RX ORDER — MEDROXYPROGESTERONE ACETATE 150 MG/ML
150 INJECTION, SUSPENSION INTRAMUSCULAR ONCE
Qty: 1 SYRINGE | Refills: 0 | Status: SHIPPED | OUTPATIENT
Start: 2021-02-23 | End: 2021-02-24 | Stop reason: SDUPTHER

## 2021-02-23 NOTE — TELEPHONE ENCOUNTER
Message left at 1:20Pm      47 Year old patient last seen in the office on 1/20/21    Patient has upcoming appointment tomorrow and needs to have her depo injection that was sent to her mail order pharmacy sent to her local pharmacy    This nurse called the patient to confirm information      Prescription sent as per MD order to patient preferred pharmacy    Patient verbalized understanding.

## 2021-02-24 ENCOUNTER — OFFICE VISIT (OUTPATIENT)
Dept: OBGYN CLINIC | Age: 41
End: 2021-02-24
Payer: COMMERCIAL

## 2021-02-24 VITALS
HEART RATE: 99 BPM | SYSTOLIC BLOOD PRESSURE: 142 MMHG | WEIGHT: 166.4 LBS | BODY MASS INDEX: 27.72 KG/M2 | DIASTOLIC BLOOD PRESSURE: 99 MMHG | HEIGHT: 65 IN

## 2021-02-24 DIAGNOSIS — Z01.419 ENCOUNTER FOR GYNECOLOGICAL EXAMINATION (GENERAL) (ROUTINE) WITHOUT ABNORMAL FINDINGS: Primary | ICD-10-CM

## 2021-02-24 DIAGNOSIS — Z76.89 ENCOUNTER FOR MENSTRUAL REGULATION: ICD-10-CM

## 2021-02-24 DIAGNOSIS — Z12.4 ENCOUNTER FOR PAPANICOLAOU SMEAR FOR CERVICAL CANCER SCREENING: ICD-10-CM

## 2021-02-24 DIAGNOSIS — D21.9 MYOMA: ICD-10-CM

## 2021-02-24 DIAGNOSIS — Z01.812 PRE-PROCEDURE LAB EXAM: ICD-10-CM

## 2021-02-24 LAB
HCG URINE, QL. (POC): NEGATIVE
VALID INTERNAL CONTROL?: YES

## 2021-02-24 PROCEDURE — 96372 THER/PROPH/DIAG INJ SC/IM: CPT | Performed by: OBSTETRICS & GYNECOLOGY

## 2021-02-24 PROCEDURE — 81025 URINE PREGNANCY TEST: CPT | Performed by: OBSTETRICS & GYNECOLOGY

## 2021-02-24 PROCEDURE — 99395 PREV VISIT EST AGE 18-39: CPT | Performed by: OBSTETRICS & GYNECOLOGY

## 2021-02-24 RX ORDER — MEDROXYPROGESTERONE ACETATE 150 MG/ML
150 INJECTION, SUSPENSION INTRAMUSCULAR ONCE
Status: COMPLETED | OUTPATIENT
Start: 2021-02-24 | End: 2021-02-24

## 2021-02-24 RX ORDER — MEDROXYPROGESTERONE ACETATE 150 MG/ML
INJECTION, SUSPENSION INTRAMUSCULAR
Qty: 1 SYRINGE | Status: CANCELLED
Start: 2021-02-24

## 2021-02-24 RX ORDER — MEDROXYPROGESTERONE ACETATE 150 MG/ML
150 INJECTION, SUSPENSION INTRAMUSCULAR ONCE
Qty: 1 SYRINGE | Refills: 0
Start: 2021-02-24 | End: 2021-02-24

## 2021-02-24 RX ADMIN — MEDROXYPROGESTERONE ACETATE 150 MG: 150 INJECTION, SUSPENSION INTRAMUSCULAR at 17:00
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
## 2021-02-24 NOTE — PATIENT INSTRUCTIONS
Well Visit, Ages 25 to 48: Care Instructions Your Care Instructions Physical exams can help you stay healthy. Your doctor has checked your overall health and may have suggested ways to take good care of yourself. He or she also may have recommended tests. At home, you can help prevent illness with healthy eating, regular exercise, and other steps. Follow-up care is a key part of your treatment and safety. Be sure to make and go to all appointments, and call your doctor if you are having problems. It's also a good idea to know your test results and keep a list of the medicines you take. How can you care for yourself at home? · Reach and stay at a healthy weight. This will lower your risk for many problems, such as obesity, diabetes, heart disease, and high blood pressure. · Get at least 30 minutes of physical activity on most days of the week. Walking is a good choice. You also may want to do other activities, such as running, swimming, cycling, or playing tennis or team sports. Discuss any changes in your exercise program with your doctor. · Do not smoke or allow others to smoke around you. If you need help quitting, talk to your doctor about stop-smoking programs and medicines. These can increase your chances of quitting for good. · Talk to your doctor about whether you have any risk factors for sexually transmitted infections (STIs). Having one sex partner (who does not have STIs and does not have sex with anyone else) is a good way to avoid these infections. · Use birth control if you do not want to have children at this time. Talk with your doctor about the choices available and what might be best for you. · Protect your skin from too much sun. When you're outdoors from 10 a.m. to 4 p.m., stay in the shade or cover up with clothing and a hat with a wide brim. Wear sunglasses that block UV rays. Even when it's cloudy, put broad-spectrum sunscreen (SPF 30 or higher) on any exposed skin. · See a dentist one or two times a year for checkups and to have your teeth cleaned. · Wear a seat belt in the car. Follow your doctor's advice about when to have certain tests. These tests can spot problems early. For everyone · Cholesterol. Have the fat (cholesterol) in your blood tested after age 21. Your doctor will tell you how often to have this done based on your age, family history, or other things that can increase your risk for heart disease. · Blood pressure. Have your blood pressure checked during a routine doctor visit. Your doctor will tell you how often to check your blood pressure based on your age, your blood pressure results, and other factors. · Vision. Talk with your doctor about how often to have a glaucoma test. 
· Diabetes. Ask your doctor whether you should have tests for diabetes. · Colon cancer. Your risk for colorectal cancer gets higher as you get older. Some experts say that adults should start regular screening at age 48 and stop at age 76. Others say to start before age 48 or continue after age 76. Talk with your doctor about your risk and when to start and stop screening. For women · Breast exam and mammogram. Talk to your doctor about when you should have a clinical breast exam and a mammogram. Medical experts differ on whether and how often women under 50 should have these tests. Your doctor can help you decide what is right for you. · Cervical cancer screening test and pelvic exam. Begin with a Pap test at age 24. The test often is part of a pelvic exam. Starting at age 27, you may choose to have a Pap test, an HPV test, or both tests at the same time (called co-testing). Talk with your doctor about how often to have testing. · Tests for sexually transmitted infections (STIs). Ask whether you should have tests for STIs. You may be at risk if you have sex with more than one person, especially if your partners do not wear condoms. For men · Tests for sexually transmitted infections (STIs). Ask whether you should have tests for STIs. You may be at risk if you have sex with more than one person, especially if you do not wear a condom. · Testicular cancer exam. Ask your doctor whether you should check your testicles regularly. · Prostate exam. Talk to your doctor about whether you should have a blood test (called a PSA test) for prostate cancer. Experts differ on whether and when men should have this test. Some experts suggest it if you are older than 39 and are -American or have a father or brother who got prostate cancer when he was younger than 72. When should you call for help? Watch closely for changes in your health, and be sure to contact your doctor if you have any problems or symptoms that concern you. Where can you learn more? Go to http://www.vila.com/ Enter P072 in the search box to learn more about \"Well Visit, Ages 25 to 48: Care Instructions. \" Current as of: May 27, 2020               Content Version: 12.6 © 4055-2135 [employer]wise, Incorporated. Care instructions adapted under license by [employer] (which disclaims liability or warranty for this information). If you have questions about a medical condition or this instruction, always ask your healthcare professional. [place] [address] any warranty or liability for your use of this information. Learning About Birth Control: The Shot What is the shot? The shot is used to prevent pregnancy. You get the shot in your upper arm or rear end (buttocks). The shot gives you a dose of the hormone progestin. The shot is often called by its brand name, Depo-Provera. Progestin prevents pregnancy in these ways: It thickens the mucus in the cervix. This makes it hard for sperm to travel into the uterus. It also thins the lining of the uterus, which makes it harder for a fertilized egg to attach to the uterus. Progestin can sometimes stop the ovaries from releasing an egg each month (ovulation). The shot provides birth control for 3 months at a time. You then need another shot. The shot may cause bone loss. Talk to your doctor about the risks and benefits. How well does it work? In the first year of use: · When the shot is used exactly as directed, fewer than 1 woman out of 100 has an unplanned pregnancy. · When the shot is not used exactly as directed, 6 women out of 100 have an unplanned pregnancy. Be sure to tell your doctor about any health problems you have or medicines you take. He or she can help you choose the birth control method that is right for you. What are the advantages of the shot? · The shot is one of the most effective methods of birth control. · It's convenient. You need to get a shot only once every 3 months to prevent pregnancy. You don't have to interrupt sex to protect against pregnancy. · It prevents pregnancy for 3 months at a time. You don't have to worry about birth control for this time. · It's safe to use while breastfeeding. · The shot may reduce heavy bleeding and cramping. · The shot doesn't contain estrogen. So you can use it if you don't want to take estrogen or can't take estrogen because you have certain health problems or concerns. What are the disadvantages of the shot? · The shot doesn't protect against sexually transmitted infections (STIs), such as herpes or HIV/AIDS. If you aren't sure if your sex partner might have an STI, use a condom to protect against disease. · The shot may cause bone loss in some women. Talk to your doctor about the risks and benefits. · The shot is needed every 3 months. Any side effects may last 3 months or longer. ? The shot may cause irregular periods, or you may have spotting between periods. You may also stop getting a period. Some women see having no period as an advantage. ? It may cause mood changes, less interest in sex, or weight gain. · If you want to get pregnant, it may take up to 18 months after you stop getting the shot. This is because the hormones the shot provided have to leave your system, and your body has to readjust. 
Where can you learn more? Go to http://www.gray.com/ Enter K984 in the search box to learn more about \"Learning About Birth Control: The Shot. \" Current as of: February 11, 2020               Content Version: 12.6 © 4710-5754 [employer]. Care instructions adapted under license by [employer] (which disclaims liability or warranty for this information). If you have questions about a medical condition or this instruction, always ask your healthcare professional. [address] any warranty or liability for your use of this information.

## 2021-02-24 NOTE — PROGRESS NOTES
Last Depo-Provera injection: around 12/18/2020 injected by herself. Side Effects if any: spotting  Serum HCG indicated? Negative pregnancy test.   Depo-Provera 150 mg IM given by: Ayanna Needles in gluteus ( left). Next Depo-Provera injection due: May 12, 2021- May 26, 2021. Administered 150mg/mL Depo-Provera per orders of Naveen Casas MD . Verbal consent received by Ms. [facility] & injection given. Patient tolerated well, no complications and no side effects. Encouraged her to remain in clinic for 15 minutes and immediately report any adverse reactions. Patient informed of next injection date ranges and encouraged to schedule. She verbalized understanding and expressed intent to comply. Pt reports she gave herself her last depo injection about 1 1/5 weeks after her 12/8/2020 appointment with Dr. Sisi Colvin      [address]: [id number]  GTIN: [id number]  LOT: [id number]  EXP: 1/30/2024  SN: [id number]

## 2021-03-04 LAB
CYTOLOGIST CVX/VAG CYTO: NORMAL
CYTOLOGY CVX/VAG DOC CYTO: NORMAL
CYTOLOGY CVX/VAG DOC THIN PREP: NORMAL
CYTOLOGY HISTORY:: NORMAL
DX ICD CODE: NORMAL
HPV I/H RISK 4 DNA CVX QL PROBE+SIG AMP: NEGATIVE
Lab: NORMAL
OTHER STN SPEC: NORMAL
STAT OF ADQ CVX/VAG CYTO-IMP: NORMAL

## 2021-03-05 NOTE — PROGRESS NOTES
Normal pap smear, message sent if [address] active. Update PMH/HM: include: Date of pap, Cytology: wnl. For HR HPV results: list NEG or POS, when done.

## 2021-05-06 ENCOUNTER — TELEPHONE (OUTPATIENT)
Dept: INTERNAL MEDICINE CLINIC | Age: 41
End: 2021-05-06

## 2021-05-06 DIAGNOSIS — R53.83 FATIGUE, UNSPECIFIED TYPE: Primary | ICD-10-CM

## 2021-05-06 NOTE — TELEPHONE ENCOUNTER
Patient states she has been feeling exhausted lately. She reports she has been under a lot of stress as well. Will get a few labs and have her  to follow up with her pcp.

## 2021-05-07 ENCOUNTER — VIRTUAL VISIT (OUTPATIENT)
Dept: INTERNAL MEDICINE CLINIC | Age: 41
End: 2021-05-07
Payer: COMMERCIAL

## 2021-05-07 DIAGNOSIS — I10 ESSENTIAL HYPERTENSION: Primary | ICD-10-CM

## 2021-05-07 DIAGNOSIS — F41.9 ANXIETY: ICD-10-CM

## 2021-05-07 PROCEDURE — 99214 OFFICE O/P EST MOD 30 MIN: CPT | Performed by: INTERNAL MEDICINE

## 2021-05-07 RX ORDER — ESCITALOPRAM OXALATE 20 MG/1
20 TABLET ORAL DAILY
Qty: 30 TAB | Refills: 2 | Status: SHIPPED | OUTPATIENT
Start: 2021-05-07 | End: 2022-03-07 | Stop reason: SDUPTHER

## 2021-05-07 RX ORDER — CLONAZEPAM 0.5 MG/1
0.5 TABLET ORAL
Qty: 30 TAB | Refills: 0 | Status: SHIPPED | OUTPATIENT
Start: 2021-05-07

## 2021-05-07 NOTE — PROGRESS NOTES
Rip Salomon (: 1980) is a 47 y.o. female, established patient, here for evaluation of the following chief complaint(s): Anxiety       ASSESSMENT/PLAN:  Below is the assessment and plan developed based on review of pertinent labs, studies, and medications. 1. Essential hypertension  Unsure what her BP runs. I advised her to monitor her BP at home consistently. Follow up in person in a few weeks. Will continue to monitor for improvements or changes. 2. Anxiety  I prescribed Lexapro. I will prescribe Klonopin to help with sleep until Lexapro starts working. I explained that Klonopin should be taken as needed. Will continue to monitor for improvements or changes. No follow-ups on file. SUBJECTIVE/OBJECTIVE:  HPI  Anxiety: She reports she went on vacation last week and has felt overwhelmed by responsibilities, difficulty sleeping, fatigued, and anxious since then. She notes difficulty sleeping prior to vacation due to stress of buying a house. She was previously on Lexapro. Hypertension ROS: taking medications as instructed, no medication side effects noted, no TIA's, no chest pain on exertion, no dyspnea on exertion, no swelling of ankles. She notes she took her BP at home and it was 106/61, which she states is low for her. She reports she has not been feeling well. Review of Systems   Constitutional: Positive for fatigue. Psychiatric/Behavioral: The patient is nervous/anxious. All other systems reviewed and are negative. No flowsheet data found. Physical Exam  Constitutional:       Appearance: Normal appearance. HENT:      Head: Normocephalic and atraumatic. Nose: Nose normal.      Mouth/Throat:      Mouth: Mucous membranes are moist.   Eyes:      Extraocular Movements: Extraocular movements intact. Neck:      Musculoskeletal: Normal range of motion. Pulmonary:      Effort: Pulmonary effort is normal.   Musculoskeletal: Normal range of motion. Neurological:      General: No focal deficit present. Mental Status: She is oriented to person, place, and time. Psychiatric:         Mood and Affect: Mood normal.         Behavior: Behavior normal.         Thought Content: Thought content normal.         Judgment: Judgment normal.         On this date 05/07/2021 I have spent 30 minutes reviewing previous notes, test results and face to face (virtual) with the patient discussing the diagnosis and importance of compliance with the treatment plan as well as documenting on the day of the visit. Tiffany Elly, was evaluated through a synchronous (real-time) audio-video encounter. The patient (or guardian if applicable) is aware that this is a billable service. Verbal consent to proceed has been obtained within the past 12 months. The visit was conducted pursuant to the emergency declaration under the [address] [place] [address] authority and the [employer] and [employer] General Act. Patient identification was verified, and a caregiver was present when appropriate. The patient was located in a state where the provider was credentialed to provide care. An electronic signature was used to authenticate this note.     Written by Zena Ahn, as dictated by Dr. Thao Dotson MD.    -- Remigio Arnett

## 2021-05-13 ENCOUNTER — TELEPHONE (OUTPATIENT)
Dept: OBGYN CLINIC | Age: 41
End: 2021-05-13

## 2021-05-13 RX ORDER — MEDROXYPROGESTERONE ACETATE 150 MG/ML
150 INJECTION, SUSPENSION INTRAMUSCULAR ONCE
Qty: 1 ML | Refills: 2 | Status: SHIPPED | OUTPATIENT
Start: 2021-05-13 | End: 2021-05-13

## 2021-05-13 NOTE — TELEPHONE ENCOUNTER
Patient is needing refills to be sent to her pharmacy for her future Depo injections. She had her last AE 2/24/21.       [id number]

## 2021-08-12 ENCOUNTER — TELEPHONE (OUTPATIENT)
Dept: INTERNAL MEDICINE CLINIC | Age: 41
End: 2021-08-12

## 2021-08-12 NOTE — LETTER
8/12/2021 10:08 AM    Ms. [facility] [id number]     To whom it may concern:    I do not feel Ms. Kathleen Chen should fly at this time. [place] has a high percentage of covid infections. This new delta variant has proven to be very infectious and is a concern. Ms. Terrell Stark lives with an immunocompromised person. With her traveling to this area would increase not only her risk but their risk as well. Thank you in advance.     Sincerely,      Kris Posada PA-C

## 2021-09-23 ENCOUNTER — VIRTUAL VISIT (OUTPATIENT)
Dept: INTERNAL MEDICINE CLINIC | Age: 41
End: 2021-09-23
Payer: COMMERCIAL

## 2021-09-23 DIAGNOSIS — R53.83 FATIGUE, UNSPECIFIED TYPE: Primary | ICD-10-CM

## 2021-09-23 DIAGNOSIS — R51.9 SINUS HEADACHE: ICD-10-CM

## 2021-09-23 DIAGNOSIS — R09.81 NASAL CONGESTION: ICD-10-CM

## 2021-09-23 PROCEDURE — 99213 OFFICE O/P EST LOW 20 MIN: CPT | Performed by: PHYSICIAN ASSISTANT

## 2021-09-23 NOTE — PROGRESS NOTES
June Augustin is a 46 y.o. female who was seen by synchronous (real-time) audio-video technology on 9/23/2021 for No chief complaint on file. Assessment & Plan:   Diagnoses and all orders for this visit:    1. Fatigue, unspecified type  -     NOVEL CORONAVIRUS (COVID-19)    2. Nasal congestion  -     NOVEL CORONAVIRUS (COVID-19)    3. Sinus headache  -     NOVEL CORONAVIRUS (COVID-19)    Patient be tested for COVID-19. She will be contact with results when they are back. Patient will stay in quarantine until results return. Advised the patient to try nasal saline for the stuffy nose. She may also try Mucinex for the slight cough. I spent at least 20 minutes on this visit with this established patient. 712  Subjective:   Patient presents for evaluation of cold symptoms. She reports yesterday she started with a stuffy nose nausea and vertigo type headache. The patient states she does have a cough that she has had that for some time. She reports that she has been feeling fatigue as well. She states that this may be due to staying getting up throughout the night to give her some pain medication. She reports she has not experienced any sore throat fever chills. She has not taken any medication for her symptoms. Patient does not recall being around anyone sick however she was recently in the hospital waiting for her son while he had a procedure done. Prior to Admission medications    Medication Sig Start Date End Date Taking? Authorizing Provider   escitalopram oxalate (LEXAPRO) 20 mg tablet Take 1 Tab by mouth daily. 5/7/21   Mary Macario MD   clonazePAM (KlonoPIN) 0.5 mg tablet Take 1 Tab by mouth nightly as needed for Anxiety.  Max Daily Amount: 0.5 mg. 5/7/21   Clint Casillas MD   medroxyPROGESTERone (DEPO-PROVERA) 150 mg/mL syrg  8/13/20   Provider, Historical   amLODIPine (NORVASC) 10 mg tablet TAKE 1 TABLET BY MOUTH EVERY DAY 5/27/20   Mary Macario MD valACYclovir (VALTREX) 500 mg tablet Take 1 Tab by mouth two (2) times a day. Patient taking differently: Take 500 mg by mouth two (2) times daily as needed. 4/7/20   Tamica Posada PA-C   butalbital-acetaminophen-caffeine (FIORICET, ESGIC) -40 mg per tablet Take 1 Tab by mouth every six (6) hours as needed for Pain. 1/30/19   Wilfred Max MD   amitriptyline (ELAVIL) 10 mg tablet Take 1 tablet by mouth nightly for 30 days. Appt needed prior to more refills 1/7/15 5/4/17  Wilfred Max MD     Patient Active Problem List    Diagnosis Date Noted    Pregnancy 04/02/2018    Tachycardia 03/18/2014    Endometriosis 02/12/2014    Family history of cystic fibrosis 10/01/2013    Menorrhagia 10/01/2013    Dysmenorrhea 10/01/2013    Migraine     Hypertension      Current Outpatient Medications   Medication Sig Dispense Refill    escitalopram oxalate (LEXAPRO) 20 mg tablet Take 1 Tab by mouth daily. 30 Tab 2    clonazePAM (KlonoPIN) 0.5 mg tablet Take 1 Tab by mouth nightly as needed for Anxiety. Max Daily Amount: 0.5 mg. 30 Tab 0    medroxyPROGESTERone (DEPO-PROVERA) 150 mg/mL syrg       amLODIPine (NORVASC) 10 mg tablet TAKE 1 TABLET BY MOUTH EVERY DAY 90 Tab 0    valACYclovir (VALTREX) 500 mg tablet Take 1 Tab by mouth two (2) times a day. (Patient taking differently: Take 500 mg by mouth two (2) times daily as needed.) 14 Tab 3    butalbital-acetaminophen-caffeine (FIORICET, ESGIC) -40 mg per tablet Take 1 Tab by mouth every six (6) hours as needed for Pain. 30 Tab 1    amitriptyline (ELAVIL) 10 mg tablet Take 1 tablet by mouth nightly for 30 days. Appt needed prior to more refills 30 tablet 5     Allergies   Allergen Reactions    Apple Nausea Only    Nitrofurantoin Diarrhea    Penicillins Other (comments)     Pt reported \"sick\". 12/26/14 0738 \"ok with ancef/keflex\"       ROS  See above  Objective:   No flowsheet data found. Temperature 98. 3.   Not able to get patient's pulse ox done due to her fingernails. General: alert, cooperative, no distress   Mental  status: normal mood, behavior, speech, dress, motor activity, and thought processes, able to follow commands   HENT: No facial tenderness with palpation   Neck: no visualized mass   Resp: no respiratory distress   Neuro: no gross deficits   Skin: no discoloration or lesions of concern on visible areas   Psychiatric: normal affect, consistent with stated mood, no evidence of hallucinations     Additional exam findings: We discussed the expected course, resolution and complications of the diagnosis(es) in detail. Medication risks, benefits, costs, interactions, and alternatives were discussed as indicated. I advised her to contact the office if her condition worsens, changes or fails to improve as anticipated. She expressed understanding with the diagnosis(es) and plan. Esther Jones, was evaluated through a synchronous (real-time) audio-video encounter. The patient (or guardian if applicable) is aware that this is a billable service. Verbal consent to proceed has been obtained within the past 12 months. The visit was conducted pursuant to the emergency declaration under the [address] [place] [address], [address] [place] [address] authority and the [employer] and [employer] General Act. Patient identification was verified, and a caregiver was present when appropriate. The patient was located in a state where the provider was credentialed to provide care.     Jean Posada PA-C

## 2021-09-25 LAB
SARS-COV-2, NAA 2 DAY TAT: NORMAL
SARS-COV-2, NAA: NOT DETECTED

## 2021-10-06 DIAGNOSIS — G43.C0 PERIODIC HEADACHE SYNDROME, NOT INTRACTABLE: ICD-10-CM

## 2021-10-06 RX ORDER — BUTALBITAL, ACETAMINOPHEN AND CAFFEINE 50; 325; 40 MG/1; MG/1; MG/1
1 TABLET ORAL
Qty: 30 TABLET | Refills: 0 | Status: SHIPPED | OUTPATIENT
Start: 2021-10-06

## 2021-10-08 ENCOUNTER — TELEPHONE (OUTPATIENT)
Dept: INTERNAL MEDICINE CLINIC | Age: 41
End: 2021-10-08

## 2021-10-08 DIAGNOSIS — I10 ESSENTIAL HYPERTENSION: Primary | ICD-10-CM

## 2021-10-08 RX ORDER — OLMESARTAN MEDOXOMIL 20 MG/1
20 TABLET ORAL DAILY
Qty: 30 TABLET | Refills: 1 | Status: SHIPPED | OUTPATIENT
Start: 2021-10-08 | End: 2022-01-21 | Stop reason: ALTCHOICE

## 2021-10-08 NOTE — TELEPHONE ENCOUNTER
Patient called the office today to state that her blood pressure has been running high. She states that she is currently taking amlodipine 10 mg. The patient states that she is taking 20 mg of amlodipine twice. I explained to the patient that this is beyond the maximum dose. I have sent Benicar 20 mg to the pharmacy for her to take. I advised the patient is very important that she get into see her primary care doctor to discuss her high blood pressure.

## 2021-10-12 NOTE — PROGRESS NOTES
Bala Cosme (: 1980) is a 46 y.o. female, established patient, here for evaluation of the following chief complaint(s):  No chief complaint on file. ASSESSMENT/PLAN:  Below is the assessment and plan developed based on review of pertinent history, physical exam, labs, studies, and medications. 1. Periodic headache syndrome, not intractable  I believe that her HA are the result of her poorly managed BP. If her HA persist once her BP is controlled, I will re-evaluate. Continue with ongoing regimen of Fioricet. Will continue to monitor for improvements or changes in ~2 weeks. 2. Essential hypertension  BP is not at goal. I strongly recommended that she take her regimen consistently and purchase an arm BP cuff. Continue with ongoing regimen of Benicar and Amlodipine. Will continue to monitor for improvements or changes in ~2 weeks. 3. Anxiety and depression  Stable and well-managed. Continue with ongoing regimen of Klonopin PRN and Lexapro. No follow-ups on file. SUBJECTIVE/OBJECTIVE:  HPI    PEÑA:  Pt presents today with concerns about recent HA. She notes that her pattern of HA had resolved until the past couple of months, when she has experienced them every other month. Her most recent migraine was \"very bad for days. \"     Hypertension ROS: taking medications as instructed, no medication side effects noted, no TIA's, no chest pain on exertion, no dyspnea on exertion, no swelling of ankles. BP in office today is 136/90. She notes that she has only taken amlodipine consistently for ~2 weeks and began Benicar 3-4 days ago. Pt reports that her BP has been 140-150/100 at home. She agrees to stay on top of this condition. Pt admits that she is eating a lot of fast food and foods that are high in salt due to moving. She notes recent sx of fatigue and over-exertion as of late. Mood: Pt routinely takes Lexapro and uses Klonopin PRN. Review of Systems   Constitutional: Positive for fatigue. Neurological: Positive for headaches. All other systems reviewed and are negative. Physical Exam  Constitutional:       Appearance: Normal appearance. HENT:      Right Ear: Tympanic membrane and external ear normal.      Left Ear: Tympanic membrane and external ear normal.      Mouth/Throat:      Mouth: Mucous membranes are moist.      Pharynx: Oropharynx is clear. Cardiovascular:      Rate and Rhythm: Normal rate and regular rhythm. Pulses: Normal pulses. Heart sounds: Normal heart sounds. Pulmonary:      Effort: Pulmonary effort is normal.      Breath sounds: Normal breath sounds. Musculoskeletal:         General: Normal range of motion. Skin:     General: Skin is warm and dry. Neurological:      General: No focal deficit present. Mental Status: She is alert and oriented to person, place, and time. Psychiatric:         Mood and Affect: Mood normal.         Behavior: Behavior normal.     On this date 10/13/2021 I have spent 30 minutes reviewing previous notes, test results and face to face with the patient discussing the diagnosis and importance of compliance with the treatment plan as well as documenting on the day of the visit. An electronic signature was used to authenticate this note. Written by Tutu Palmer as dictated by Dr. Andrey Molina.    -- Tutu Palmer

## 2021-10-13 ENCOUNTER — OFFICE VISIT (OUTPATIENT)
Dept: INTERNAL MEDICINE CLINIC | Age: 41
End: 2021-10-13
Payer: COMMERCIAL

## 2021-10-13 VITALS — DIASTOLIC BLOOD PRESSURE: 90 MMHG | SYSTOLIC BLOOD PRESSURE: 136 MMHG

## 2021-10-13 DIAGNOSIS — G43.C0 PERIODIC HEADACHE SYNDROME, NOT INTRACTABLE: Primary | ICD-10-CM

## 2021-10-13 DIAGNOSIS — I10 ESSENTIAL HYPERTENSION: ICD-10-CM

## 2021-10-13 DIAGNOSIS — F32.A ANXIETY AND DEPRESSION: ICD-10-CM

## 2021-10-13 DIAGNOSIS — F41.9 ANXIETY AND DEPRESSION: ICD-10-CM

## 2021-10-13 PROCEDURE — 99214 OFFICE O/P EST MOD 30 MIN: CPT | Performed by: INTERNAL MEDICINE

## 2021-10-23 ENCOUNTER — TELEPHONE (OUTPATIENT)
Dept: INTERNAL MEDICINE CLINIC | Age: 41
End: 2021-10-23

## 2021-10-23 DIAGNOSIS — H00.011 HORDEOLUM EXTERNUM OF RIGHT UPPER EYELID: Primary | ICD-10-CM

## 2021-10-23 RX ORDER — ERYTHROMYCIN 5 MG/G
OINTMENT OPHTHALMIC
Qty: 3.5 G | Refills: 0 | Status: SHIPPED | OUTPATIENT
Start: 2021-10-23 | End: 2022-01-21 | Stop reason: ALTCHOICE

## 2021-10-23 NOTE — TELEPHONE ENCOUNTER
Patient states she has a stye of her right eye. It started about one week ago. She has been trying warm compress once a day. Patient is looking to get antibiotic.

## 2022-01-20 NOTE — PROGRESS NOTES
Sha Henderson (: 1980) is a 46 y.o. female, established patient, here for evaluation of the following chief complaint(s):   Follow-up       ASSESSMENT/PLAN:  Below is the assessment and plan developed based on review of pertinent history, labs, studies, and medications. 1. Migraine without aura and without status migrainosus, not intractable  -     erenumab-aooe (Aimovig Autoinjector) 70 mg/mL injection; 1 mL by SubCUTAneous route once for 1 dose., Normal, Disp-1 Each, R-5  Not at goal. I believe that she could benefit from a daily preventative medication. She did not have success with Elavil or Topamax in the past, so I recommended Aimivig. Continue with Fioricet PRN. 2. Primary hypertension  -     hydroCHLOROthiazide (HYDRODIURIL) 25 mg tablet; Take 1 Tablet by mouth daily. , Normal, Disp-90 Tablet, R-1  BP is not at goal. I suspect that her feelings of lightheadedness are related to her elevated diastolic pressure, stress, and HA. I recommended that she resume HCTZ, in addition to Amlodipine. Will continue to monitor for improvements or changes. 3. Lightheaded  I suspect that her feelings of lightheadedness are related to her elevated diastolic pressure, stress, and HA. Until these are under control, I do not believe that we should explore alternative explanations or treatments. 4. Anxiety  Stable and well-managed. Continue with ongoing regimen of Lexapro. No follow-ups on file. SUBJECTIVE/OBJECTIVE:  HPI    Hypertension ROS: taking medications as instructed, no medication side effects noted, no TIA's, no chest pain on exertion, no dyspnea on exertion, no swelling of ankles. BP in office today is 132/100. She notes elevation of her diastolic pressure as of late. Pt reports frequently feeling lightheaded. HA: Pt notes a persistent migraine since . She states that Fioricet is taking the edge off, but she still feels pressure running from her head to her neck.  Pt reports an increase in the frequency of her migraines as of late, which she attributes to the stress of work and HTN. Review of Systems   Neurological: Positive for light-headedness and headaches.         Patient-Reported Systolic (Top): 690  Patient-Reported Diastolic (Bottom): 045       Physical Exam    [INSTRUCTIONS:  \"[x]\" Indicates a positive item  \"[]\" Indicates a negative item  -- DELETE ALL ITEMS NOT EXAMINED]    Constitutional: [x] Appears well-developed and well-nourished [x] No apparent distress      [] Abnormal -     Mental status: [x] Alert and awake  [x] Oriented to person/place/time [x] Able to follow commands    [] Abnormal -     Eyes:   EOM    [x]  Normal    [] Abnormal -   Sclera  [x]  Normal    [] Abnormal -          Discharge [x]  None visible   [] Abnormal -     HENT: [x] Normocephalic, atraumatic  [] Abnormal -   [x] Mouth/Throat: Mucous membranes are moist    External Ears [x] Normal  [] Abnormal -    Neck: [x] No visualized mass [] Abnormal -     Pulmonary/Chest: [x] Respiratory effort normal   [x] No visualized signs of difficulty breathing or respiratory distress        [] Abnormal -      Musculoskeletal:   [x] Normal gait with no signs of ataxia         [x] Normal range of motion of neck        [] Abnormal -     Neurological:        [x] No Facial Asymmetry (Cranial nerve 7 motor function) (limited exam due to video visit)          [x] No gaze palsy        [] Abnormal -          Skin:        [x] No significant exanthematous lesions or discoloration noted on facial skin         [] Abnormal -            Psychiatric:       [x] Normal Affect [] Abnormal -        [x] No Hallucinations    Other pertinent observable physical exam findings:-        On this date 01/21/2022 I have spent 30 minutes reviewing previous notes, test results and face to face (virtual) with the patient discussing the diagnosis and importance of compliance with the treatment plan as well as documenting on the day of the visit.    Zan Arrieta, was evaluated through a synchronous (real-time) audio-video encounter. The patient (or guardian if applicable) is aware that this is a billable service, which includes applicable co-pays. Verbal consent to proceed has been obtained. The visit was conducted pursuant to the emergency declaration under the [address] [place] [address] waiver authority and the [employer] General Act. Patient identification was verified, and a caregiver was present when appropriate. The patient was located at home in a state where the provider was licensed to provide care. An electronic signature was used to authenticate this note. Written by Tahir Haile as dictated by Dr. Mp Lopez.    -- Tahir Haile

## 2022-01-21 ENCOUNTER — VIRTUAL VISIT (OUTPATIENT)
Dept: INTERNAL MEDICINE CLINIC | Age: 42
End: 2022-01-21
Payer: COMMERCIAL

## 2022-01-21 DIAGNOSIS — R42 LIGHTHEADED: ICD-10-CM

## 2022-01-21 DIAGNOSIS — G43.009 MIGRAINE WITHOUT AURA AND WITHOUT STATUS MIGRAINOSUS, NOT INTRACTABLE: Primary | ICD-10-CM

## 2022-01-21 DIAGNOSIS — F41.9 ANXIETY: ICD-10-CM

## 2022-01-21 DIAGNOSIS — I10 PRIMARY HYPERTENSION: ICD-10-CM

## 2022-01-21 PROCEDURE — 99214 OFFICE O/P EST MOD 30 MIN: CPT | Performed by: INTERNAL MEDICINE

## 2022-01-21 RX ORDER — ERENUMAB-AOOE 70 MG/ML
70 INJECTION SUBCUTANEOUS ONCE
Qty: 1 EACH | Refills: 5 | Status: SHIPPED | OUTPATIENT
Start: 2022-01-21 | End: 2022-03-14 | Stop reason: SDUPTHER

## 2022-01-21 RX ORDER — HYDROCHLOROTHIAZIDE 25 MG/1
25 TABLET ORAL DAILY
Qty: 90 TABLET | Refills: 1 | Status: SHIPPED | OUTPATIENT
Start: 2022-01-21 | End: 2022-05-11 | Stop reason: SDUPTHER

## 2022-01-28 ENCOUNTER — TELEPHONE (OUTPATIENT)
Dept: INTERNAL MEDICINE CLINIC | Age: 42
End: 2022-01-28

## 2022-01-28 RX ORDER — VALACYCLOVIR HYDROCHLORIDE 500 MG/1
500 TABLET, FILM COATED ORAL DAILY
Qty: 90 TABLET | Refills: 1 | Status: SHIPPED | OUTPATIENT
Start: 2022-01-28 | End: 2022-03-14 | Stop reason: SDUPTHER

## 2022-01-28 RX ORDER — VALACYCLOVIR HYDROCHLORIDE 500 MG/1
1000 TABLET, FILM COATED ORAL DAILY
Qty: 10 TABLET | Refills: 0 | Status: SHIPPED | OUTPATIENT
Start: 2022-01-28 | End: 2022-03-14 | Stop reason: SDUPTHER

## 2022-01-28 NOTE — TELEPHONE ENCOUNTER
Patient would like to get a refill of her antiviral medication. She will need to get a prescription for a current outbreak and a refill for suppression medication as well. Medication was sent to the pharmacy.   Thank you

## 2022-03-07 DIAGNOSIS — I10 ESSENTIAL HYPERTENSION: ICD-10-CM

## 2022-03-07 DIAGNOSIS — F41.9 ANXIETY: ICD-10-CM

## 2022-03-07 RX ORDER — ESCITALOPRAM OXALATE 20 MG/1
20 TABLET ORAL DAILY
Qty: 90 TABLET | Refills: 1 | Status: SHIPPED | OUTPATIENT
Start: 2022-03-07 | End: 2022-03-07 | Stop reason: SDUPTHER

## 2022-03-07 RX ORDER — AMLODIPINE BESYLATE 10 MG/1
10 TABLET ORAL DAILY
Qty: 90 TABLET | Refills: 1 | Status: SHIPPED | OUTPATIENT
Start: 2022-03-07 | End: 2022-03-07 | Stop reason: SDUPTHER

## 2022-03-07 RX ORDER — ESCITALOPRAM OXALATE 20 MG/1
20 TABLET ORAL DAILY
Qty: 30 TABLET | Refills: 0 | Status: SHIPPED | OUTPATIENT
Start: 2022-03-07 | End: 2022-07-18

## 2022-03-07 RX ORDER — AMLODIPINE BESYLATE 10 MG/1
10 TABLET ORAL DAILY
Qty: 30 TABLET | Refills: 0 | Status: SHIPPED | OUTPATIENT
Start: 2022-03-07 | End: 2022-07-18

## 2022-03-07 NOTE — TELEPHONE ENCOUNTER
Requested Prescriptions     Pending Prescriptions Disp Refills    amLODIPine (NORVASC) 10 mg tablet 90 Tablet 0     Sig: Take 1 Tablet by mouth daily.  escitalopram oxalate (LEXAPRO) 20 mg tablet 30 Tablet 2     Sig: Take 1 Tablet by mouth daily.      Verified pharmacy

## 2022-03-14 ENCOUNTER — HOSPITAL ENCOUNTER (OUTPATIENT)
Dept: VASCULAR SURGERY | Age: 42
Discharge: HOME | End: 2022-03-14
Attending: INTERNAL MEDICINE
Payer: COMMERCIAL

## 2022-03-14 ENCOUNTER — OFFICE VISIT (OUTPATIENT)
Dept: INTERNAL MEDICINE CLINIC | Age: 42
End: 2022-03-14
Attending: INTERNAL MEDICINE
Payer: COMMERCIAL

## 2022-03-14 VITALS
WEIGHT: 165.8 LBS | SYSTOLIC BLOOD PRESSURE: 107 MMHG | OXYGEN SATURATION: 99 % | HEART RATE: 83 BPM | HEIGHT: 65 IN | TEMPERATURE: 98.9 F | BODY MASS INDEX: 27.63 KG/M2 | RESPIRATION RATE: 16 BRPM | DIASTOLIC BLOOD PRESSURE: 75 MMHG

## 2022-03-14 DIAGNOSIS — F41.9 ANXIETY: ICD-10-CM

## 2022-03-14 DIAGNOSIS — M79.604 RIGHT LEG PAIN: ICD-10-CM

## 2022-03-14 DIAGNOSIS — I10 ESSENTIAL HYPERTENSION: ICD-10-CM

## 2022-03-14 DIAGNOSIS — M79.604 RIGHT LEG PAIN: Primary | ICD-10-CM

## 2022-03-14 DIAGNOSIS — G43.009 MIGRAINE WITHOUT AURA AND WITHOUT STATUS MIGRAINOSUS, NOT INTRACTABLE: ICD-10-CM

## 2022-03-14 PROCEDURE — 99214 OFFICE O/P EST MOD 30 MIN: CPT | Performed by: INTERNAL MEDICINE

## 2022-03-14 PROCEDURE — 93971 EXTREMITY STUDY: CPT

## 2022-03-14 RX ORDER — ERENUMAB-AOOE 70 MG/ML
70 INJECTION SUBCUTANEOUS ONCE
Qty: 1 EACH | Refills: 5 | Status: SHIPPED | OUTPATIENT
Start: 2022-03-14 | End: 2022-05-11 | Stop reason: SDUPTHER

## 2022-03-14 NOTE — PROGRESS NOTES
Dave Arthur (: 1980) is a 46 y.o. female, established patient, here for evaluation of the following chief complaint(s):  Leg Pain (R leg)       ASSESSMENT/PLAN:  Below is the assessment and plan developed based on review of pertinent history, physical exam, labs, studies, and medications. 1. Right leg pain  -     DUPLEX LOWER EXT VENOUS BILAT; Future  While her sx would be from her back, I would like to order an US to rule out a clot since she has tenderness and swelling. If the US is normal, I will feel confident that her pain is the result of overworking her back and recommend that she take anti-inflammatories for 10 days. 2. Essential hypertension  BP is at goal. I do not recommend any change in medications (Amlodipine, HCTZ). 3. Migraine without aura and without status migrainosus, not intractable  Not at goal. Since she has tried other regimens (Elavil, Topamax, Propanolol) without success, I would like to try Jane Gianotti as a preventative medication. She tried [facility] and had a side effect with feeling cold and chilled when she took it  4. Anxiety  Stable and well-managed. Continue with ongoing regimen of Lexapro. No follow-ups on file. SUBJECTIVE/OBJECTIVE:  HPI    Leg pain: Pt presents today with c/o R leg pain. She denies a specific injury that led to her sx. Her sx presented as soreness in her ankle on 3/12/22, but progressed into aching pain across her whole leg by the end of the day 3/13/22. Pt states that her \"leg gave out\" after being on her feet for an extended period of time on 3/12/22. She endorses tenderness behind her R knee and swelling yesterday. HA: Pt reports frequent HA. She expressed interest in a medication that she discussed with a drug rep a work, but can't remember the name. Review of Systems   Musculoskeletal: Positive for arthralgias. All other systems reviewed and are negative. Physical Exam  Constitutional:       Appearance: Normal appearance. HENT:      Right Ear: Tympanic membrane and external ear normal.      Left Ear: Tympanic membrane and external ear normal.      Mouth/Throat:      Mouth: Mucous membranes are moist.      Pharynx: Oropharynx is clear. Cardiovascular:      Rate and Rhythm: Normal rate and regular rhythm. Pulses: Normal pulses. Heart sounds: Normal heart sounds. Pulmonary:      Effort: Pulmonary effort is normal.      Breath sounds: Normal breath sounds. Musculoskeletal:         General: Normal range of motion. Skin:     General: Skin is warm and dry. Neurological:      General: No focal deficit present. Mental Status: She is alert and oriented to person, place, and time. Psychiatric:         Mood and Affect: Mood normal.         Behavior: Behavior normal.       On this date 03/14/2022 I have spent 30 minutes reviewing previous notes, test results and face to face with the patient discussing the diagnosis and importance of compliance with the treatment plan as well as documenting on the day of the visit. An electronic signature was used to authenticate this note. Written by Tian Shoemaker as dictated by Dr. Roland Girard.    -- Tian Shoemaker

## 2022-03-18 PROBLEM — Z34.90 PREGNANCY: Status: ACTIVE | Noted: 2018-04-02

## 2022-03-24 ENCOUNTER — OFFICE VISIT (OUTPATIENT)
Dept: INTERNAL MEDICINE CLINIC | Age: 42
End: 2022-03-24
Payer: COMMERCIAL

## 2022-03-24 VITALS
DIASTOLIC BLOOD PRESSURE: 70 MMHG | HEART RATE: 82 BPM | BODY MASS INDEX: 28.19 KG/M2 | RESPIRATION RATE: 16 BRPM | TEMPERATURE: 99 F | OXYGEN SATURATION: 98 % | SYSTOLIC BLOOD PRESSURE: 110 MMHG | HEIGHT: 65 IN | WEIGHT: 169.2 LBS

## 2022-03-24 DIAGNOSIS — G43.009 MIGRAINE WITHOUT AURA AND WITHOUT STATUS MIGRAINOSUS, NOT INTRACTABLE: ICD-10-CM

## 2022-03-24 DIAGNOSIS — I10 ESSENTIAL HYPERTENSION: ICD-10-CM

## 2022-03-24 DIAGNOSIS — R59.9 LYMPH NODE ENLARGEMENT: Primary | ICD-10-CM

## 2022-03-24 DIAGNOSIS — M79.604 RIGHT LEG PAIN: ICD-10-CM

## 2022-03-24 PROCEDURE — 99214 OFFICE O/P EST MOD 30 MIN: CPT | Performed by: INTERNAL MEDICINE

## 2022-03-24 NOTE — PROGRESS NOTES
Sha Henderson (: 1980) is a 46 y.o. female, established patient, here for evaluation of the following chief complaint(s):  Follow-up       ASSESSMENT/PLAN:  Below is the assessment and plan developed based on review of pertinent history, physical exam, labs, studies, and medications. 1. Lymph node enlargement  -     US ABD COMP; Future  -     CBC WITH AUTOMATED DIFF; Future  I recommended a repeat US to explore the reported enlarged lymph node from 3/14/22. 2. Right leg pain  -     XR SPINE LUMB 2 OR 3 V; Future  I suspect that her sx are stemming from her back, based on her sx and PE. I will order an x-ray and recommended PT. 3. Essential hypertension  -     METABOLIC PANEL, COMPREHENSIVE; Future  -     LIPID PANEL; Future  BP is at goal. I do not recommend any change in medications (Amlodipine and HCTZ). 4. Migraine without aura and without status migrainosus, not intractable  Stable and well-managed. Continue with ongoing regimen of Fioricet PRN. No follow-ups on file. SUBJECTIVE/OBJECTIVE:  HPI     Leg pain: Pt presents today with persistent aches that radiate down her R leg, particularly in her thigh and calf muscles. Enlarged lymph node: Pt denies changes in her bowel patterns or difficulty urinating. Hypertension ROS: taking medications as instructed, no medication side effects noted, no TIA's, no chest pain on exertion, no dyspnea on exertion, no swelling of ankles. BP in office today is 110/70. Review of Systems   Musculoskeletal: Positive for myalgias. All other systems reviewed and are negative. Physical Exam  Constitutional:       Appearance: Normal appearance. HENT:      Right Ear: Tympanic membrane and external ear normal.      Left Ear: Tympanic membrane and external ear normal.      Mouth/Throat:      Mouth: Mucous membranes are moist.      Pharynx: Oropharynx is clear. Cardiovascular:      Rate and Rhythm: Normal rate and regular rhythm.       Pulses: Normal pulses. Heart sounds: Normal heart sounds. Pulmonary:      Effort: Pulmonary effort is normal.      Breath sounds: Normal breath sounds. Musculoskeletal:         General: Normal range of motion. Skin:     General: Skin is warm and dry. Neurological:      General: No focal deficit present. Mental Status: She is alert and oriented to person, place, and time. Psychiatric:         Mood and Affect: Mood normal.         Behavior: Behavior normal.       On this date 03/24/2022 I have spent 35 minutes reviewing previous notes, test results and face to face with the patient discussing the diagnosis and importance of compliance with the treatment plan as well as documenting on the day of the visit. An electronic signature was used to authenticate this note. Written by Paula Eduardo as dictated by Dr. Pari Ricketts.    -- Paula Eduardo

## 2022-03-25 LAB
ALBUMIN SERPL-MCNC: 3.7 G/DL (ref 3.5–5)
ALBUMIN/GLOB SERPL: 0.8 {RATIO} (ref 1.1–2.2)
ALP SERPL-CCNC: 121 U/L (ref 45–117)
ALT SERPL-CCNC: 20 U/L (ref 12–78)
ANION GAP SERPL CALC-SCNC: 4 MMOL/L (ref 5–15)
AST SERPL-CCNC: 15 U/L (ref 15–37)
BASOPHILS # BLD: 0.1 K/UL (ref 0–0.1)
BASOPHILS NFR BLD: 1 % (ref 0–1)
BILIRUB SERPL-MCNC: 0.3 MG/DL (ref 0.2–1)
BUN SERPL-MCNC: 11 MG/DL (ref 6–20)
BUN/CREAT SERPL: 12 (ref 12–20)
CALCIUM SERPL-MCNC: 10 MG/DL (ref 8.5–10.1)
CHLORIDE SERPL-SCNC: 103 MMOL/L (ref 97–108)
CHOLEST SERPL-MCNC: 238 MG/DL
CO2 SERPL-SCNC: 27 MMOL/L (ref 21–32)
CREAT SERPL-MCNC: 0.94 MG/DL (ref 0.55–1.02)
DIFFERENTIAL METHOD BLD: ABNORMAL
EOSINOPHIL # BLD: 0 K/UL (ref 0–0.4)
EOSINOPHIL NFR BLD: 0 % (ref 0–7)
ERYTHROCYTE [DISTWIDTH] IN BLOOD BY AUTOMATED COUNT: 13 % (ref 11.5–14.5)
GLOBULIN SER CALC-MCNC: 4.4 G/DL (ref 2–4)
GLUCOSE SERPL-MCNC: 89 MG/DL (ref 65–100)
HCT VFR BLD AUTO: 43.7 % (ref 35–47)
HDLC SERPL-MCNC: 55 MG/DL
HDLC SERPL: 4.3 {RATIO} (ref 0–5)
HGB BLD-MCNC: 14.4 G/DL (ref 11.5–16)
IMM GRANULOCYTES # BLD AUTO: 0 K/UL (ref 0–0.04)
IMM GRANULOCYTES NFR BLD AUTO: 0 % (ref 0–0.5)
LDLC SERPL CALC-MCNC: 161.2 MG/DL (ref 0–100)
LYMPHOCYTES # BLD: 2.8 K/UL (ref 0.8–3.5)
LYMPHOCYTES NFR BLD: 27 % (ref 12–49)
MCH RBC QN AUTO: 30 PG (ref 26–34)
MCHC RBC AUTO-ENTMCNC: 33 G/DL (ref 30–36.5)
MCV RBC AUTO: 91 FL (ref 80–99)
MONOCYTES # BLD: 0.8 K/UL (ref 0–1)
MONOCYTES NFR BLD: 8 % (ref 5–13)
NEUTS SEG # BLD: 6.5 K/UL (ref 1.8–8)
NEUTS SEG NFR BLD: 64 % (ref 32–75)
NRBC # BLD: 0 K/UL (ref 0–0.01)
NRBC BLD-RTO: 0 PER 100 WBC
PLATELET # BLD AUTO: 405 K/UL (ref 150–400)
PMV BLD AUTO: 10.5 FL (ref 8.9–12.9)
POTASSIUM SERPL-SCNC: 4.2 MMOL/L (ref 3.5–5.1)
PROT SERPL-MCNC: 8.1 G/DL (ref 6.4–8.2)
RBC # BLD AUTO: 4.8 M/UL (ref 3.8–5.2)
SODIUM SERPL-SCNC: 134 MMOL/L (ref 136–145)
TRIGL SERPL-MCNC: 109 MG/DL (ref ?–150)
VLDLC SERPL CALC-MCNC: 21.8 MG/DL
WBC # BLD AUTO: 10.2 K/UL (ref 3.6–11)

## 2022-03-28 ENCOUNTER — HOSPITAL ENCOUNTER (OUTPATIENT)
Dept: GENERAL RADIOLOGY | Age: 42
Discharge: HOME | End: 2022-03-28
Payer: COMMERCIAL

## 2022-03-28 DIAGNOSIS — M79.604 RIGHT LEG PAIN: ICD-10-CM

## 2022-03-28 PROCEDURE — 72100 X-RAY EXAM L-S SPINE 2/3 VWS: CPT

## 2022-03-30 ENCOUNTER — HOSPITAL ENCOUNTER (OUTPATIENT)
Dept: ULTRASOUND IMAGING | Age: 42
Discharge: HOME | End: 2022-03-30
Attending: INTERNAL MEDICINE
Payer: COMMERCIAL

## 2022-03-30 DIAGNOSIS — R59.9 LYMPH NODE ENLARGEMENT: ICD-10-CM

## 2022-03-30 PROCEDURE — 76882 US LMTD JT/FCL EVL NVASC XTR: CPT

## 2022-05-10 NOTE — PROGRESS NOTES
Alyse Tariq (: 1980) is a 46 y.o. female, established patient, here for evaluation of the following chief complaint(s):   Follow-up (needs HCTZ sent to [facility] )       ASSESSMENT/PLAN:  Below is the assessment and plan developed based on review of pertinent history, labs, studies, and medications. 1. Current moderate episode of major depressive disorder without prior episode (HCC)  -     buPROPion XL (WELLBUTRIN XL) 150 mg tablet; Take 1 Tablet by mouth every morning., Normal, Disp-30 Tablet, R-3  I do not believe that she is suffering from attention deficit, but suspect that her fatigue is the result of depression. I will add Wellbutrin to her current regimen (Lexapro) in hopes of increasing her energy and focus, as well as referred her to a psychiatrist for further medication management. Continue with counseling as well. 2. Primary hypertension  -     hydroCHLOROthiazide (HYDRODIURIL) 25 mg tablet; Take 1 Tablet by mouth daily. , Normal, Disp-90 Tablet, R-1  BP is at goal. I do not recommend any change in medications (Amlodipine and HCTZ). 3. Migraine without aura and without status migrainosus, not intractable  -     erenumab-aooe (Aimovig Autoinjector) 70 mg/mL injection; 1 mL by SubCUTAneous route once for 1 dose., Normal, Disp-3 Each, R-5  Stable and well-managed. Continue with ongoing regimen of Fioricet PRN and Aimovig. SUBJECTIVE/OBJECTIVE:  HPI     Mood: Pt began counseling in hopes of resolving feeling \"stuck,\" who believes that the pt suffers from ADD/ADHD. She denies suspicions of this as she has low energy. Pt states that she goes to bed immediately after getting home from work or errands, and missed work last week due to fatigue. She reports low motivation with school and her career. Pt endorses compliance with Lexparo. Review of Systems   Constitutional: Positive for fatigue.    Psychiatric/Behavioral:        Lack of motivation    All other systems reviewed and are negative. Patient-Reported Weight: 163.5lb       Physical Exam    [INSTRUCTIONS:  \"[x]\" Indicates a positive item  \"[]\" Indicates a negative item  -- DELETE ALL ITEMS NOT EXAMINED]    Constitutional: [x] Appears well-developed and well-nourished [x] No apparent distress      [] Abnormal -     Mental status: [x] Alert and awake  [x] Oriented to person/place/time [x] Able to follow commands    [] Abnormal -     Eyes:   EOM    [x]  Normal    [] Abnormal -   Sclera  [x]  Normal    [] Abnormal -          Discharge [x]  None visible   [] Abnormal -     HENT: [x] Normocephalic, atraumatic  [] Abnormal -   [x] Mouth/Throat: Mucous membranes are moist    External Ears [x] Normal  [] Abnormal -    Neck: [x] No visualized mass [] Abnormal -     Pulmonary/Chest: [x] Respiratory effort normal   [x] No visualized signs of difficulty breathing or respiratory distress        [] Abnormal -      Musculoskeletal:   [x] Normal gait with no signs of ataxia         [x] Normal range of motion of neck        [] Abnormal -     Neurological:        [x] No Facial Asymmetry (Cranial nerve 7 motor function) (limited exam due to video visit)          [x] No gaze palsy        [] Abnormal -          Skin:        [x] No significant exanthematous lesions or discoloration noted on facial skin         [] Abnormal -            Psychiatric:       [x] Normal Affect [] Abnormal -        [x] No Hallucinations    Other pertinent observable physical exam findings:-        On this date 05/11/2022 I have spent 25 minutes reviewing previous notes, test results and face to face (virtual) with the patient discussing the diagnosis and importance of compliance with the treatment plan as well as documenting on the day of the visit. Lisa Mejia, was evaluated through a synchronous (real-time) audio-video encounter. The patient (or guardian if applicable) is aware that this is a billable service, which includes applicable co-pays.  Verbal consent to proceed has been obtained. The visit was conducted pursuant to the emergency declaration under the [facility] [address] authority and the [employer] General Act. Patient identification was verified, and a caregiver was present when appropriate. The patient was located at home in a state where the provider was licensed to provide care. An electronic signature was used to authenticate this note. Written by Alma Bernal as dictated by Dr. Jennifer Henriquez.    -- Alma Bernal

## 2022-05-11 ENCOUNTER — VIRTUAL VISIT (OUTPATIENT)
Dept: INTERNAL MEDICINE CLINIC | Age: 42
End: 2022-05-11
Payer: COMMERCIAL

## 2022-05-11 DIAGNOSIS — I10 PRIMARY HYPERTENSION: ICD-10-CM

## 2022-05-11 DIAGNOSIS — F32.1 CURRENT MODERATE EPISODE OF MAJOR DEPRESSIVE DISORDER WITHOUT PRIOR EPISODE (HCC): Primary | ICD-10-CM

## 2022-05-11 DIAGNOSIS — G43.009 MIGRAINE WITHOUT AURA AND WITHOUT STATUS MIGRAINOSUS, NOT INTRACTABLE: ICD-10-CM

## 2022-05-11 PROCEDURE — 99213 OFFICE O/P EST LOW 20 MIN: CPT | Performed by: INTERNAL MEDICINE

## 2022-05-11 RX ORDER — ERENUMAB-AOOE 70 MG/ML
70 INJECTION SUBCUTANEOUS ONCE
Qty: 3 EACH | Refills: 5 | Status: SHIPPED | OUTPATIENT
Start: 2022-05-11 | End: 2022-05-11

## 2022-05-11 RX ORDER — HYDROCHLOROTHIAZIDE 25 MG/1
25 TABLET ORAL DAILY
Qty: 90 TABLET | Refills: 1 | Status: SHIPPED | OUTPATIENT
Start: 2022-05-11 | End: 2022-09-08

## 2022-05-11 RX ORDER — BUPROPION HYDROCHLORIDE 150 MG/1
150 TABLET ORAL
Qty: 30 TABLET | Refills: 3 | Status: SHIPPED | OUTPATIENT
Start: 2022-05-11 | End: 2022-06-15 | Stop reason: SDUPTHER

## 2022-06-06 ENCOUNTER — TELEPHONE (OUTPATIENT)
Dept: OBGYN CLINIC | Age: 42
End: 2022-06-06

## 2022-06-06 RX ORDER — MEDROXYPROGESTERONE ACETATE 150 MG/ML
150 INJECTION, SUSPENSION INTRAMUSCULAR ONCE
Qty: 1 ML | Refills: 0 | Status: SHIPPED | OUTPATIENT
Start: 2022-06-06 | End: 2022-06-06

## 2022-06-06 RX ORDER — VALACYCLOVIR HYDROCHLORIDE 500 MG/1
500 TABLET, FILM COATED ORAL 2 TIMES DAILY
Qty: 6 TABLET | Refills: 0 | Status: SHIPPED | OUTPATIENT
Start: 2022-06-06 | End: 2022-06-09

## 2022-06-06 RX ORDER — VALACYCLOVIR HYDROCHLORIDE 500 MG/1
TABLET, FILM COATED ORAL
Qty: 90 TABLET | Refills: 0 | Status: SHIPPED | OUTPATIENT
Start: 2022-06-06 | End: 2022-10-16

## 2022-06-06 NOTE — TELEPHONE ENCOUNTER
Patient advised of MD sent prescriptions and instructions     Patient verbalized understanding.       Patient would still like prescription for valtrex for suppression sent to [facility]    Please advise    Thank you

## 2022-06-06 NOTE — TELEPHONE ENCOUNTER
Message left at 8:55am      46year old patient last seen in the office on 2/24/2021 for ae    Patient left a message asking for refills on her depo , valtrex for out break and valtrex for suppression    Patient was called back and advised of need for annual exam     Patient has ae on 7/6/2022 at 1:00pm to arrive at 12:45pm       Depo injection pending    Patient is past dates and was advised of no unprotected intercourse for two weeks and will need upt        Patient had current out break and wants that prescription for valtrex sent to local pharmacy and then prescription for valtrex for suppression to go to her mail order pharmacy [facility]    Please advise    Thank you

## 2022-06-07 NOTE — TELEPHONE ENCOUNTER
Patient advised of MD sent suppression prescription to her mail order pharmacy for 3 months    Patient verbalized understanding.

## 2022-07-03 ENCOUNTER — PATIENT MESSAGE (OUTPATIENT)
Dept: OBGYN CLINIC | Age: 42
End: 2022-07-03

## 2022-07-22 ENCOUNTER — TELEPHONE (OUTPATIENT)
Dept: OBGYN CLINIC | Age: 42
End: 2022-07-22

## 2022-07-22 RX ORDER — VALACYCLOVIR HYDROCHLORIDE 500 MG/1
500 TABLET, FILM COATED ORAL 2 TIMES DAILY
Qty: 180 TABLET | Refills: 0 | Status: SHIPPED | OUTPATIENT
Start: 2022-07-22 | End: 2022-08-31 | Stop reason: SDUPTHER

## 2022-07-22 RX ORDER — MEDROXYPROGESTERONE ACETATE 150 MG/ML
150 INJECTION, SUSPENSION INTRAMUSCULAR ONCE
Qty: 1 EACH | Refills: 0
Start: 2022-07-22 | End: 2022-07-22 | Stop reason: CLARIF

## 2022-07-22 RX ORDER — MEDROXYPROGESTERONE ACETATE 150 MG/ML
150 INJECTION, SUSPENSION INTRAMUSCULAR ONCE
Qty: 1 EACH | Refills: 0
Start: 2022-07-22 | End: 2022-07-22

## 2022-07-22 NOTE — TELEPHONE ENCOUNTER
Patient contacted clinic. Confirmed name and date of birth. Patient stated previously call for medication refill on 6/6/2022 were not received by pharmacies. Depo-provera and Valtrex scripts, approved by MD on 6/6/2022 (SEE ENCOUNTER NOTES). Upon review of chart, scripts were previously sent to incorrect pharmacies. Resending Depo-Provera and Valtrex script per written MD approval to patient's pharmacies of choice. Patient scheduled for annual appointment on 8/31/22.

## 2022-08-01 RX ORDER — MEDROXYPROGESTERONE ACETATE 150 MG/ML
150 INJECTION, SUSPENSION INTRAMUSCULAR ONCE
COMMUNITY
End: 2022-08-31 | Stop reason: SDUPTHER

## 2022-08-01 NOTE — TELEPHONE ENCOUNTER
Patient contact clinic. Confirmed name and . See previous telephone encounters. Previous depo refill script sent was . Patient unable to obtain mediation at pharmacy due to this. Contacted pharmacy, placed verbal order for previous script that . Patient med list updated.

## 2022-08-03 ENCOUNTER — TELEPHONE (OUTPATIENT)
Dept: PHARMACY | Age: 42
End: 2022-08-03

## 2022-08-03 NOTE — TELEPHONE ENCOUNTER
Medication Management Service    Date: 8/3/2022  Patient's Name: Beka Calabrese YOB: 1980            _____________________________________________________________________________________________          Left message to schedule initial Specialty Medication PharmD review. Please return call: [phone] option#4.     Rupinder GlassD [facility]  Ambulatory Clinical Pharmacist  Specialty Medication Services  Phone: [phone]

## 2022-08-05 ENCOUNTER — TELEPHONE (OUTPATIENT)
Dept: PHARMACY | Age: 42
End: 2022-08-05

## 2022-08-08 ENCOUNTER — TELEPHONE (OUTPATIENT)
Dept: PHARMACY | Age: 42
End: 2022-08-08

## 2022-08-08 NOTE — TELEPHONE ENCOUNTER
Medication Management Service    Date: 8/8/2022  Patient's Name: Svetlana Zaman YOB: 1980            _____________________________________________________________________________________________          Left message to schedule initial Specialty Medication PharmD review. Please return call: [phone] option#4.     Bela Adan PharmD [facility]  Ambulatory Clinical Pharmacist  Specialty Medication Services  Phone: [phone]

## 2022-08-29 ENCOUNTER — TELEPHONE (OUTPATIENT)
Dept: PHARMACY | Age: 42
End: 2022-08-29

## 2022-08-29 NOTE — TELEPHONE ENCOUNTER
Medication Management Service    Date: 8/29/2022  Patient's Name: Conner Villalobos YOB: 1980            _____________________________________________________________________________________________    Scheduled patient for initial visit with the [facility] program on 9/1/22 at 2 PM.     Thank you,  Ferdinand Bar.  NANO NunezS  Clinical Pharmacist   [facility] & Specialty Medication Services  [phone]        For Pharmacy [address] [place] [address] in place: No  Recommendation Provided To: Patient/Caregiver: 1 via Telephone  Intervention Detail: Scheduled Appointment  Gap Closed?: No  Intervention Accepted By: Patient/Caregiver: 1  Time Spent (min): 15

## 2022-08-31 ENCOUNTER — OFFICE VISIT (OUTPATIENT)
Dept: OBGYN CLINIC | Age: 42
End: 2022-08-31
Payer: COMMERCIAL

## 2022-08-31 VITALS
BODY MASS INDEX: 28.29 KG/M2 | WEIGHT: 169.8 LBS | SYSTOLIC BLOOD PRESSURE: 136 MMHG | HEART RATE: 93 BPM | DIASTOLIC BLOOD PRESSURE: 84 MMHG | HEIGHT: 65 IN

## 2022-08-31 DIAGNOSIS — Z01.419 ENCOUNTER FOR GYNECOLOGICAL EXAMINATION (GENERAL) (ROUTINE) WITHOUT ABNORMAL FINDINGS: Primary | ICD-10-CM

## 2022-08-31 DIAGNOSIS — Z76.89 ENCOUNTER FOR MENSTRUAL REGULATION: ICD-10-CM

## 2022-08-31 PROCEDURE — 99396 PREV VISIT EST AGE 40-64: CPT | Performed by: OBSTETRICS & GYNECOLOGY

## 2022-08-31 RX ORDER — MEDROXYPROGESTERONE ACETATE 150 MG/ML
150 INJECTION, SUSPENSION INTRAMUSCULAR ONCE
Qty: 1 ML | Refills: 4 | Status: SHIPPED | OUTPATIENT
Start: 2022-08-31 | End: 2022-08-31

## 2022-08-31 RX ORDER — VALACYCLOVIR HYDROCHLORIDE 500 MG/1
500 TABLET, FILM COATED ORAL DAILY
Qty: 180 TABLET | Refills: 4 | Status: SHIPPED | OUTPATIENT
Start: 2022-08-31 | End: 2022-09-12
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
## 2022-09-01 ENCOUNTER — TELEPHONE (OUTPATIENT)
Dept: PHARMACY | Age: 42
End: 2022-09-01

## 2022-09-01 NOTE — TELEPHONE ENCOUNTER
Specialty Medication Service    Date: 9/1/2022  Patient's Name: Elvia Benavides YOB: 1980            _____________________________________________________________________________________________    Patient no showed scheduled initial PharmD appointment for Specialty Medication Services. Please call: [phone] option 4. Left 2 messages to reschedule appointment. Will continue to outreach as appropriate. Thank you,  Hue Cook.  Michael Castro BCPS  Clinical Pharmacist   [address] [place] [address] Medication Management & Specialty Medication Service  Phone: [phone]        For Pharmacy [address] [place] [address] in place: No  Recommendation Provided To: Patient/Caregiver: 1 via Telephone  Intervention Detail: Scheduled Appointment  Gap Closed?: No  Intervention Accepted By: Patient/Caregiver: 0  Time Spent (min): 30

## 2022-09-06 ENCOUNTER — TELEPHONE (OUTPATIENT)
Dept: PHARMACY | Age: 42
End: 2022-09-06

## 2022-09-06 NOTE — TELEPHONE ENCOUNTER
Specialty Medication Service    Date: 9/6/2022  Patient's Name: Jarod Webster YOB: 1980            _____________________________________________________________________________________________    Left message to reschedule Initial [facility] PharmD appointment for Specialty Medication Services. Please call: [phone] option 4. Will continue to outreach as appropriate. Thank you,  Caterina Mancera.  Nikkie Solomon, [address] [facility] Medication Management & Specialty Medication Service  Phone: [phone]            For Pharmacy [facility] in place: No  Recommendation Provided To: Patient/Caregiver: 1 via Telephone  Intervention Detail: Scheduled Appointment  Intervention Accepted By: Patient/Caregiver: 0  Time Spent (min): 5

## 2022-09-08 NOTE — TELEPHONE ENCOUNTER
Specialty Medication Service    Date: 9/8/2022  Patient's Name: Halley Denny YOB: 1980            _____________________________________________________________________________________________    Julia [place] to patient to schedule initial specialty medication services initial PharmD review for 09/12 with follow-up medical director review scheduled for 10/17/2022.     Wanda Sanchez, PharmD [facility]  Ambulatory Clinical Pharmacist  Specialty Medication Services  Phone: [phone]    For Pharmacy [address] [place] [address] in place: Yes  Recommendation Provided To: Patient/Caregiver: 1 via Telephone  Intervention Detail: Scheduled Appointment  Intervention Accepted By: Patient/Caregiver: 1  Time Spent (min): 5

## 2022-09-12 ENCOUNTER — VIRTUAL VISIT (OUTPATIENT)
Dept: PHARMACY | Age: 42
End: 2022-09-12

## 2022-09-12 DIAGNOSIS — G43.709 CHRONIC MIGRAINE WITHOUT AURA WITHOUT STATUS MIGRAINOSUS, NOT INTRACTABLE: Primary | ICD-10-CM

## 2022-09-12 RX ORDER — BISMUTH SUBSALICYLATE 262 MG
1 TABLET,CHEWABLE ORAL DAILY
COMMUNITY

## 2022-09-12 NOTE — Clinical Note
Charan Green. Completed pharmacy review for initial SMS visit. Patient is continuing on [address] for migraines. Patient is doing well with [address] and no recommendations for therapy at this time. However, patient is recommended to follow-up on lipids with PCP at this time. Patient is recommended for flu, Tdap, C19 booster vaccination. Medication pended for review. Follow-up 6 months.    Thank you, Jacey Deutsch, PharmD [facility] Ambulatory Clinical Pharmacist Specialty Medication Services Phone: [phone]
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
## 2022-10-17 ENCOUNTER — VIRTUAL VISIT (OUTPATIENT)
Dept: PHARMACY | Age: 42
End: 2022-10-17

## 2022-10-17 DIAGNOSIS — G43.709 CHRONIC MIGRAINE WITHOUT AURA WITHOUT STATUS MIGRAINOSUS, NOT INTRACTABLE: Primary | ICD-10-CM

## 2022-10-17 RX ORDER — ERENUMAB-AOOE 70 MG/ML
INJECTION SUBCUTANEOUS
Qty: 3 ML | Refills: 1 | Status: SHIPPED | OUTPATIENT
Start: 2022-10-17
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
## 2023-01-09 ENCOUNTER — TELEPHONE (OUTPATIENT)
Dept: OBGYN CLINIC | Age: 43
End: 2023-01-09

## 2023-01-09 RX ORDER — VALACYCLOVIR HYDROCHLORIDE 500 MG/1
TABLET, FILM COATED ORAL
Qty: 180 TABLET | Refills: 3 | Status: SHIPPED | OUTPATIENT
Start: 2023-01-09

## 2023-01-09 NOTE — TELEPHONE ENCOUNTER
38year old patient last seen in the office on 8/31/2022 for ae  Please advise regarding pharmacy requested medication last refilled on 10/16/2022    Please amend/sign pended rx from the pharmacy  Per progress notes that she has history of genital herpes    Thank you

## 2023-02-11 DIAGNOSIS — I10 PRIMARY HYPERTENSION: ICD-10-CM

## 2023-02-11 RX ORDER — HYDROCHLOROTHIAZIDE 25 MG/1
TABLET ORAL
Qty: 30 TABLET | Refills: 1 | Status: SHIPPED | OUTPATIENT
Start: 2023-02-11

## 2023-03-09 ENCOUNTER — TELEPHONE (OUTPATIENT)
Dept: PHARMACY | Age: 43
End: 2023-03-09

## 2023-03-09 NOTE — LETTER
[address] [facility]  [address], [address]        [address] [facility] [address]           03/15/23     Dear Jazmyne Godinez,    We tried to contact you recently to schedule your follow up appointment with the Specialty Medication Service but were unable to reach you on the telephone. As a reminder, patients receive one-on-one education and medication counseling from a pharmacist at no cost to the patient as part of the Specialty Medication Service. Please give us a call at [phone] option 4 to schedule your follow up appointment at your earliest convenience.      Thank you,    Specialty Medication Service  Telephone: [phone] option 4   Fax: [phone]   Email: [email or web address]            Sincerely,      MARTIN Waterman

## 2023-03-09 NOTE — TELEPHONE ENCOUNTER
Specialty Medication Service    Date: 3/9/2023  Patient's Name: Natalee Mays YOB: 1980            _____________________________________________________________________________________________    Left message to schedule [facility] follow-up appointment for Specialty Medication Services. Please call: [phone] option 4. Will continue to outreach as appropriate.       Rmoe Mcmanus, PharmD [facility]  Ambulatory Clinical Pharmacist  Specialty Medication Services  Phone: [phone] option #4  Fax: [phone]

## 2023-03-13 NOTE — TELEPHONE ENCOUNTER
Specialty Medication Service    Date: 3/13/2023  Patient's Name: Francine García YOB: 1980            _____________________________________________________________________________________________    Left message to schedule [facility] follow-up appointment for Specialty Medication Services. Please call: [phone] option 4. Will continue to outreach as appropriate.       Dayana Arana, PharmD [facility]  Ambulatory Clinical Pharmacist  Specialty Medication Services  Phone: [phone] option #4  Fax: [phone]

## 2023-03-15 NOTE — TELEPHONE ENCOUNTER
Specialty Medication Service    Date: 3/15/2023  Patient's Name: Rolland Mcburney YOB: 1980            _____________________________________________________________________________________________    Left third message  to schedule SMS follow-up appointment for Specialty Medication Services. Please call: [phone] option 4. Will continue to outreach as appropriate. Sent letter and Bundle today as well.  Will follow-up one month    Rupinder AdamsD [facility]  Ambulatory Clinical Pharmacist  Specialty Medication Services  Phone: [phone] option #4  Fax: [phone]    For Pharmacy Admin Tracking Only    Program: SMS  CPA in place: Yes  Recommendation Provided To: Patient/Caregiver: 1 via Telephone  Intervention Detail: Scheduled Appointment  Intervention Accepted By: Patient/Caregiver: 0  Time Spent (min): 20

## 2023-03-24 ENCOUNTER — OFFICE VISIT (OUTPATIENT)
Dept: FAMILY MEDICINE CLINIC | Age: 43
End: 2023-03-24
Payer: COMMERCIAL

## 2023-03-24 VITALS
HEIGHT: 65 IN | WEIGHT: 173.3 LBS | BODY MASS INDEX: 28.87 KG/M2 | SYSTOLIC BLOOD PRESSURE: 104 MMHG | TEMPERATURE: 82 F | HEART RATE: 82 BPM | DIASTOLIC BLOOD PRESSURE: 73 MMHG | OXYGEN SATURATION: 98 %

## 2023-03-24 DIAGNOSIS — E66.3 OVERWEIGHT (BMI 25.0-29.9): ICD-10-CM

## 2023-03-24 DIAGNOSIS — R53.83 FATIGUE, UNSPECIFIED TYPE: Primary | ICD-10-CM

## 2023-03-24 DIAGNOSIS — R00.0 TACHYCARDIA: ICD-10-CM

## 2023-03-24 DIAGNOSIS — R29.818 SUSPECTED SLEEP APNEA: ICD-10-CM

## 2023-03-24 DIAGNOSIS — F32.1 CURRENT MODERATE EPISODE OF MAJOR DEPRESSIVE DISORDER WITHOUT PRIOR EPISODE (HCC): ICD-10-CM

## 2023-03-24 LAB
25(OH)D3 SERPL-MCNC: 13.3 NG/ML (ref 30–100)
ALBUMIN SERPL-MCNC: 3.6 G/DL (ref 3.5–5)
ALBUMIN/GLOB SERPL: 0.8 (ref 1.1–2.2)
ALP SERPL-CCNC: 117 U/L (ref 45–117)
ALT SERPL-CCNC: 20 U/L (ref 12–78)
ANION GAP SERPL CALC-SCNC: 2 MMOL/L (ref 5–15)
AST SERPL-CCNC: 10 U/L (ref 15–37)
BASOPHILS # BLD: 0.1 K/UL (ref 0–0.1)
BASOPHILS NFR BLD: 1 % (ref 0–1)
BILIRUB SERPL-MCNC: 0.5 MG/DL (ref 0.2–1)
BUN SERPL-MCNC: 13 MG/DL (ref 6–20)
BUN/CREAT SERPL: 14 (ref 12–20)
CALCIUM SERPL-MCNC: 9.8 MG/DL (ref 8.5–10.1)
CHLORIDE SERPL-SCNC: 104 MMOL/L (ref 97–108)
CHOLEST SERPL-MCNC: 247 MG/DL
CO2 SERPL-SCNC: 29 MMOL/L (ref 21–32)
CREAT SERPL-MCNC: 0.95 MG/DL (ref 0.55–1.02)
DIFFERENTIAL METHOD BLD: NORMAL
EOSINOPHIL # BLD: 0 K/UL (ref 0–0.4)
EOSINOPHIL NFR BLD: 0 % (ref 0–7)
ERYTHROCYTE [DISTWIDTH] IN BLOOD BY AUTOMATED COUNT: 12.5 % (ref 11.5–14.5)
EST. AVERAGE GLUCOSE BLD GHB EST-MCNC: 114 MG/DL
FERRITIN SERPL-MCNC: 40 NG/ML (ref 26–388)
GLOBULIN SER CALC-MCNC: 4.6 G/DL (ref 2–4)
GLUCOSE SERPL-MCNC: 90 MG/DL (ref 65–100)
HBA1C MFR BLD: 5.6 % (ref 4–5.6)
HCT VFR BLD AUTO: 44.7 % (ref 35–47)
HDLC SERPL-MCNC: 55 MG/DL
HDLC SERPL: 4.5 (ref 0–5)
HGB BLD-MCNC: 14.5 G/DL (ref 11.5–16)
IMM GRANULOCYTES # BLD AUTO: 0 K/UL (ref 0–0.04)
IMM GRANULOCYTES NFR BLD AUTO: 0 % (ref 0–0.5)
IRON SATN MFR SERPL: 31 % (ref 20–50)
IRON SERPL-MCNC: 91 UG/DL (ref 35–150)
LDLC SERPL CALC-MCNC: 175.6 MG/DL (ref 0–100)
LYMPHOCYTES # BLD: 1.7 K/UL (ref 0.8–3.5)
LYMPHOCYTES NFR BLD: 21 % (ref 12–49)
MCH RBC QN AUTO: 30 PG (ref 26–34)
MCHC RBC AUTO-ENTMCNC: 32.4 G/DL (ref 30–36.5)
MCV RBC AUTO: 92.5 FL (ref 80–99)
MONOCYTES # BLD: 0.6 K/UL (ref 0–1)
MONOCYTES NFR BLD: 7 % (ref 5–13)
NEUTS SEG # BLD: 5.5 K/UL (ref 1.8–8)
NEUTS SEG NFR BLD: 71 % (ref 32–75)
NRBC # BLD: 0 K/UL (ref 0–0.01)
NRBC BLD-RTO: 0 PER 100 WBC
PLATELET # BLD AUTO: 391 K/UL (ref 150–400)
PMV BLD AUTO: 10.3 FL (ref 8.9–12.9)
POTASSIUM SERPL-SCNC: 4 MMOL/L (ref 3.5–5.1)
PROT SERPL-MCNC: 8.2 G/DL (ref 6.4–8.2)
RBC # BLD AUTO: 4.83 M/UL (ref 3.8–5.2)
SODIUM SERPL-SCNC: 135 MMOL/L (ref 136–145)
T4 FREE SERPL-MCNC: 0.9 NG/DL (ref 0.8–1.5)
TIBC SERPL-MCNC: 290 UG/DL (ref 250–450)
TRIGL SERPL-MCNC: 82 MG/DL (ref ?–150)
TSH SERPL DL<=0.05 MIU/L-ACNC: 1.04 UIU/ML (ref 0.36–3.74)
VIT B12 SERPL-MCNC: 368 PG/ML (ref 193–986)
VLDLC SERPL CALC-MCNC: 16.4 MG/DL
WBC # BLD AUTO: 7.8 K/UL (ref 3.6–11)

## 2023-03-24 PROCEDURE — 93000 ELECTROCARDIOGRAM COMPLETE: CPT | Performed by: STUDENT IN AN ORGANIZED HEALTH CARE EDUCATION/TRAINING PROGRAM

## 2023-03-24 PROCEDURE — 3078F DIAST BP <80 MM HG: CPT | Performed by: STUDENT IN AN ORGANIZED HEALTH CARE EDUCATION/TRAINING PROGRAM

## 2023-03-24 PROCEDURE — 99204 OFFICE O/P NEW MOD 45 MIN: CPT | Performed by: STUDENT IN AN ORGANIZED HEALTH CARE EDUCATION/TRAINING PROGRAM

## 2023-03-24 PROCEDURE — 3074F SYST BP LT 130 MM HG: CPT | Performed by: STUDENT IN AN ORGANIZED HEALTH CARE EDUCATION/TRAINING PROGRAM

## 2023-03-24 NOTE — PROGRESS NOTES
Urbano Jones is a 38 y.o. female , id x 2(name and ). Reviewed record, history, and  medications. Chief Complaint   Patient presents with    Labs     Is fasting     Fatigue     About 6 months all she wants to do is sleep. Depression     During depression screen pt mentioned a lack of motivation that is abnormal for her. She has a hard time getting up in the am and motivating herself to start her day and then she has a tough time falling asleep. When pt is awake she often goes to lay back down  and finds herself spending a lot more time in bed instead of doing the ADL's she is used to doing diligently. Vitals:    23 1106   BP: 104/73   Pulse: 82   Temp: (!) 82 °F (27.8 °C)   SpO2: 98%   Weight: 173 lb 4.8 oz (78.6 kg)   Height: 5' 5\" (1.651 m)       Coordination of Care Questionnaire:   1. Have you been to the ER, urgent care clinic since your last visit? Hospitalized since your last visit? No    2. Have you seen or consulted any other health care providers outside of the [address] [place] [address] since your last visit? Include any pap smears or colon screening.  No      3 most recent PHQ Screens 3/24/2023   Little interest or pleasure in doing things Nearly every day   Feeling down, depressed, irritable, or hopeless More than half the days   Total Score PHQ 2 5   Trouble falling or staying asleep, or sleeping too much More than half the days   Feeling tired or having little energy More than half the days   Poor appetite, weight loss, or overeating Not at all   Feeling bad about yourself - or that you are a failure or have let yourself or your family down -   Trouble concentrating on things such as school, work, reading, or watching TV Nearly every day   Moving or speaking so slowly that other people could have noticed; or the opposite being so fidgety that others notice Not at all   Thoughts of being better off dead, or hurting yourself in some way Not at all   PHQ 9 Score -   How difficult have these problems made it for you to do your work, take care of your home and get along with others Somewhat difficult       Social Determinants of Health     Tobacco Use: Low Risk     Smoking Tobacco Use: Never    Smokeless Tobacco Use: Never    Passive Exposure: Not on file   Alcohol Use: Not on file   Financial Resource Strain: Not on file   Food Insecurity: Not on file   Transportation Needs: Not on file   Physical Activity: Not on file   Stress: Not on file   Social Connections: Not on file   Intimate Partner Violence: Not on file   Depression: At risk    PHQ-2 Score: 5   Housing Stability: Not on file       Patient is accompanied by self I have received verbal consent from Skyla Sanches to discuss any/all medical information while they are present in the room.

## 2023-03-24 NOTE — PATIENT INSTRUCTIONS
Get some sunlight in your day. Use a walk instead of nap in the evenings. Work on a relaxing bedtime routine  Move bedtime earlier.   7-9 hours, no more than 10 hours of sleep is the end goal.

## 2023-03-27 DIAGNOSIS — E55.9 HYPOVITAMINOSIS D: ICD-10-CM

## 2023-03-27 PROBLEM — F32.1 CURRENT MODERATE EPISODE OF MAJOR DEPRESSIVE DISORDER WITHOUT PRIOR EPISODE (HCC): Status: ACTIVE | Noted: 2023-03-27

## 2023-03-27 PROBLEM — R53.83 FATIGUE: Status: ACTIVE | Noted: 2023-03-27

## 2023-03-27 PROBLEM — E66.3 OVERWEIGHT (BMI 25.0-29.9): Status: ACTIVE | Noted: 2023-03-27

## 2023-03-27 RX ORDER — ERGOCALCIFEROL 1.25 MG/1
50000 CAPSULE ORAL
Qty: 12 CAPSULE | Refills: 1 | Status: SHIPPED | OUTPATIENT
Start: 2023-03-27

## 2023-03-28 NOTE — PROGRESS NOTES
Vitamin D low, start weekly high-dose supplement and recheck in 3 months. Cholesterol significantly elevated, 10-year risk 0.6%. Focus on healthy lifestyle changes.   Normal CMP, A1c, iron levels, B12, TSH and free T4, CBC

## 2023-03-28 NOTE — ASSESSMENT & PLAN NOTE
Severe, impacting daily functioning  Initiate work-up as below  Encouraged to try and eliminate the nap, go to bed earlier if needed, add an evening walk if able, increase sunlight exposure.

## 2023-03-28 NOTE — ASSESSMENT & PLAN NOTE
Mood reported as good, consider as a possible cause if no other identified for persistent fatigue  Currently on Wellbutrin 150 mg, Lexapro 20 mg daily.

## 2023-04-17 ENCOUNTER — TELEPHONE (OUTPATIENT)
Dept: PHARMACY | Age: 43
End: 2023-04-17

## 2023-04-17 NOTE — TELEPHONE ENCOUNTER
Specialty Medication Service    Date: 4/17/2023  Patient's Name: Zhanna Rivers YOB: 1980            _____________________________________________________________________________________________    Left message to schedule [facility] follow-up appointment for Specialty Medication Services. Please call: [phone] option 4. Will continue to outreach as appropriate.       Rupinder MeekD [facility]  Ambulatory Clinical Pharmacist  Specialty Medication Services  Phone: [phone] option #4  Fax: [phone]

## 2023-04-19 NOTE — TELEPHONE ENCOUNTER
Specialty Medication Service    Date: 4/19/2023  Patient's Name: Jaycee Krause YOB: 1980            _____________________________________________________________________________________________    Left message to schedule [facility] follow-up appointment for Specialty Medication Services. Please call: [phone] option 4. Will continue to outreach as appropriate.       Rupinder NagyD [facility]  Ambulatory Clinical Pharmacist  Specialty Medication Services  Phone: [phone] option #4  Fax: [phone]

## 2023-04-21 NOTE — TELEPHONE ENCOUNTER
Specialty Medication Service    Date: 4/21/2023  Patient's Name: Gume Samaniego YOB: 1980            _____________________________________________________________________________________________        Specialty Medication Service    Date: 4/21/2023  Patient's Name: Gume Samaniego YOB: 1980            _____________________________________________________________________________________________    Left message to schedule [facility] follow-up appointment for Specialty Medication Services. Please call: [phone] option 4. Will continue to outreach as appropriate.       For Pharmacy Admin Tracking Only    Program: [facility]  CPA in place: Yes  Time Spent (min): 5

## 2023-05-01 ENCOUNTER — OFFICE VISIT (OUTPATIENT)
Dept: SLEEP MEDICINE | Age: 43
End: 2023-05-01
Payer: COMMERCIAL

## 2023-05-01 VITALS
HEART RATE: 88 BPM | HEIGHT: 65 IN | OXYGEN SATURATION: 99 % | BODY MASS INDEX: 30.34 KG/M2 | WEIGHT: 182.1 LBS | SYSTOLIC BLOOD PRESSURE: 129 MMHG | DIASTOLIC BLOOD PRESSURE: 91 MMHG

## 2023-05-01 DIAGNOSIS — F41.9 ANXIETY AND DEPRESSION: ICD-10-CM

## 2023-05-01 DIAGNOSIS — F32.A ANXIETY AND DEPRESSION: ICD-10-CM

## 2023-05-01 DIAGNOSIS — G47.419 NARCOLEPSY WITHOUT CATAPLEXY: Primary | ICD-10-CM

## 2023-05-01 DIAGNOSIS — I10 PRIMARY HYPERTENSION: ICD-10-CM

## 2023-05-01 PROCEDURE — 3080F DIAST BP >= 90 MM HG: CPT | Performed by: INTERNAL MEDICINE

## 2023-05-01 PROCEDURE — 99204 OFFICE O/P NEW MOD 45 MIN: CPT | Performed by: INTERNAL MEDICINE

## 2023-05-01 PROCEDURE — 3074F SYST BP LT 130 MM HG: CPT | Performed by: INTERNAL MEDICINE

## 2023-05-01 NOTE — PATIENT INSTRUCTIONS
[address] [facility] Jerzy. [place], [address]  Tel.  [phone]  Fax. 100 [facility] 60  [place], [address]  Tel.  [phone]  Fax. [phone] [address]  Tel.  [phone]  Fax. [phone]       Narcolepsy: After Your Visit  Your Care Instructions  Everybody gets a little sleepy once in a while, during a long car ride or other times when you want to be alert. But some people cannot control their sleepiness. It is no fun to be in the middle of your workday or driving your car down the street and have an overwhelming desire to sleep. This condition is called narcolepsy. Doctors do not know what causes narcolepsy. Your doctor may ask you to keep a sleep diary for a couple of weeks. It will help you and your doctor decide on treatment. It often helps to take limited naps during the day. It also helps to create a good mood and place for nighttime sleep. Your doctor may recommend medicine to help you stay awake during the day or sleep at night. Follow-up care is a key part of your treatment and safety. Be sure to make and go to all appointments, and call your doctor if you are having problems. It's also a good idea to know your test results and keep a list of the medicines you take. How can you care for yourself at home? Try to take 2 or 3 short naps at regular times during the day. After a nap, always give yourself time to become alert before you drive a car or do anything that might cause an accident. Take your medicines exactly as prescribed. Call your doctor if you think you are having a problem with your medicine. You may need to try several medicines before you find the one that works best for you. Try to improve your nighttime sleep habits. Here are a few of the things you could do:  Go to bed only when you are sleepy, and get up at the same time every day, even if you do not feel rested.  This might help you sleep well the next night and the night after that. If you lie awake for longer than 15 minutes, get up, leave the bedroom, and do something quiet, such as read, until you feel sleepy again. Avoid drinking or eating anything with caffeine after 3 p.m. This includes coffee, tea, cola drinks, and chocolate. Make sure your bedroom is not too hot or too cold, and keep it quiet and dark. Make sure your mattress provides good support. Be kind to your body:  Relieve tension with exercise or a massage. Learn and do relaxation techniques. Avoid alcohol, caffeine, nicotine, and illegal drugs. They can increase your anxiety level and cause sleep problems. Get light exercise daily. Gentle stretching, light aerobics, swimming, walking, and riding a bicycle can help to keep you going during the day and to sleep well at night. Eat a healthy diet. You may feel better if you avoid heavy meals and eat more fruits and vegetables. Do not use over-the-counter sleeping pills. They can make your sleep restless. Ask your doctor if any medicines you take could cause sleepiness. For example, cold and allergy medicines can make you drowsy. Consider joining a support group with people who have narcolepsy or other sleep problems. These groups can be a good source of tips for what to do. Also, it can be comforting to talk to people who face similar challenges. Your doctor can tell you how to contact a support group. When should you call for help? Call your doctor now or seek immediate medical care if:  You passed out (lost consciousness). You cannot use your muscles. This may happen very briefly, sometimes after you laugh or are angry, and may only affect part of your body. Watch closely for changes in your health, and be sure to contact your doctor if:  Your sleepiness continues to get worse. Where can you learn more?    Go to Filmmortal.be  Enter V069 in the search box to learn more about \"Narcolepsy: After Your Visit. \"   © 0357-9400 Healthwise, Incorporated. Care instructions adapted under license by [employer] (which disclaims liability or warranty for this information). This care instruction is for use with your licensed healthcare professional. If you have questions about a medical condition or this instruction, always ask your healthcare professional. [address] any warranty or liability for your use of this information. Content Version: 9.0.80076; Last Revised: September 15, 2009  PROPER SLEEP HYGIENE    What to avoid  Do not have drinks with caffeine, such as coffee or black tea, for 8 hours before bed. Do not smoke or use other types of tobacco near bedtime. Nicotine is a stimulant and can keep you awake. Avoid drinking alcohol late in the evening, because it can cause you to wake in the middle of the night. Do not eat a big meal close to bedtime. If you are hungry, eat a light snack. Do not drink a lot of water close to bedtime, because the need to urinate may wake you up during the night. Do not read or watch TV in bed. Use the bed only for sleeping and sexual activity. What to try  Go to bed at the same time every night, and wake up at the same time every morning. Do not take naps during the day. Keep your bedroom quiet, dark, and cool. Get regular exercise, but not within 3 to 4 hours of your bedtime. .  Sleep on a comfortable pillow and mattress. If watching the clock makes you anxious, turn it facing away from you so you cannot see the time. If you worry when you lie down, start a worry book. Well before bedtime, write down your worries, and then set the book and your concerns aside. Try meditation or other relaxation techniques before you go to bed. If you cannot fall asleep, get up and go to another room until you feel sleepy. Do something relaxing. Repeat your bedtime routine before you go to bed again. Make your house quiet and calm about an hour before bedtime. Turn down the lights, turn off the TV, log off the computer, and turn down the volume on music. This can help you relax after a busy day. Drowsy Driving: The [facility] cites drowsiness as a causing factor in more than 432,037 police reported crashes annually, resulting in 76,000 injuries and 1,500 deaths. Other surveys suggest 55% of people polled have driven while drowsy in the past year, 23% had fallen asleep but not crashed, 3% crashed, and 2% had and accident due to drowsy driving. Who is at risk? Young Drivers: One study of drowsy driving accidents states that 55% of the drivers were under 25 years. Of those, 75% were male. Shift Workers and Travelers: People who work overnight or travel across time zones frequently are at higher risk of experiencing Circadian Rhythm Disorders. They are trying to work and function when their body is programed to sleep. Sleep Deprived: Lack of sleep has a serious impact on your ability to pay attention or focus on a task. Consistently getting less than the average of 8 hours your body needs creates partial or cumulative sleep deprivation. Untreated Sleep Disorders: Sleep Apnea, Narcolepsy, R.L.S., and other sleep disorders (untreated) prevent a person from getting enough restful sleep. This leads to excessive daytime sleepiness and increases the risk for drowsy driving accidents by up to 7 times. Medications / Alcohol: Even over the counter medications can cause drowsiness. Medications that impair a drivers attention should have a warning label. Alcohol naturally makes you sleepy and on its own can cause accidents. Combined with excessive drowsiness its effects are amplified.    Signs of Drowsy Driving:   * You don't remember driving the last few miles   * You may drift out of your finn   * You are unable to focus and your thoughts wander   * You may yawn more often than normal   * You have difficulty keeping your eyes open / nodding off   * Missing traffic signs, speeding, or tailgating  Prevention-   Good sleep hygiene, lifestyle and behavioral choices have the most impact on drowsy driving. There is no substitute for sleep and the average person requires 8 hours nightly. If you find yourself driving drowsy, stop and sleep. Consider the sleep hygiene tips provided during your visit as well. Medication Refill Policy: Refills for all medications require 1 week advance notice. Please have your pharmacy fax a refill request. We are unable to fax, or call in \"controled substance\" medications and you will need to pick these prescriptions up from our office. [employer] Activation    Thank you for requesting access to [employer]. Please follow the instructions below to securely access and download your online medical record. [employer] allows you to send messages to your doctor, view your test results, renew your prescriptions, schedule appointments, and more. How Do I Sign Up? In your internet browser, go to [email or web address]. Click on the First Time User? Click Here link in the Sign In box. You will see the New Member Sign Up page. Enter your [employer] Access Code exactly as it appears below. You will not need to use this code after youve completed the sign-up process. If you do not sign up before the expiration date, you must request a new code. [employer] Access Code: Activation code not generated  Current [employer] Status: Active (This is the date your Twist and Shoutt access code will )    Enter the last four digits of your Social Security Number (xxxx) and Date of Birth (mm/dd/yyyy) as indicated and click Submit. You will be taken to the next sign-up page. Create a Twist and Shoutt ID. This will be your [employer] login ID and cannot be changed, so think of one that is secure and easy to remember. Create a [employer] password. You can change your password at any time. Enter your Password Reset Question and Answer.  This can be used at a later time if you forget your password. Enter your e-mail address. You will receive e-mail notification when new information is available in [address] Click Sign Up. You can now view and download portions of your medical record. Click the Sawerly link to download a portable copy of your medical information. Additional Information    If you have questions, please call [phone]. Remember, Ember is NOT to be used for urgent needs. For medical emergencies, dial 911.

## 2023-05-01 NOTE — PROGRESS NOTES
217 [facility] [address]. [place], [address]  Tel.  [phone]  Fax. 100 [facility] 60  [place], [address] [facility]  Tel.  [phone]  Fax. [phone] [address]  Tel.  [phone]  Fax. [phone]       Renetta Solis is a 38y.o. year old female referred by Krystle Putnam MD   for evaluation of a sleep disorder. ASSESSMENT/PLAN:      ICD-10-CM ICD-9-CM    1. Narcolepsy without cataplexy  G47.419 347.00 POLYSOMNOGRAPHY 1 NIGHT      MULTIPLE SLEEP LATENCY TEST      DRUG ABUSE PROF, URINE (SEVEN DRUGS), MS COFIRM      DRUG ABUSE PROF, URINE (SEVEN DRUGS), MS COFIRM      2. Anxiety and depression  F41.9 300.00     F32. A 311       3. Primary hypertension  I10 401.9       4. BMI 30.0-30.9,adult  Z68.30 V85.30           Patient has a history and examination consistent with the diagnosis of sleep apnea. * The patient currently has a Minimal risk for having sleep apnea. STOP-BANG score 2.    * Sleep testing was ordered for initial evaluation. Orders Placed This Encounter    MULTIPLE SLEEP LATENCY TEST     Standing Status:   Future     Standing Expiration Date:   5/1/2024    DRUG ABUSE PROF, URINE (SEVEN DRUGS), MS COFIRM     Standing Status:   Future     Number of Occurrences:   1     Standing Expiration Date:   11/1/2023    POLYSOMNOGRAPHY 1 NIGHT     Scheduling Instructions:      Perform testing while sleeping supine     Order Specific Question:   Reason for Exam     Answer:   Narcolepsy         * The patient currently has a Minimal risk for having sleep apnea. STOP-BANG score 2.  * Since narcolepsy may present in this manner testing is advised. * PSG / MSLT was ordered for initial evaluation of narcolepsy. Testing protocol including need for urine drug screen reviewed. * The need for cancelling the daytime testing in the event of certain night time findings was discussed.   * Effect of medications on testing was discussed. * She has agreed to maintain a regular sleep schedule and practice good sleep hygiene. * Patient is aware of the need to not start any new medications until testing is completed. * Patient is aware of the need to avoid driving or performing tasks requiring a high degree of vigilance in the presence of unintentional sleep attacks. 2. Anxiety and Depression -  continue on current regimen - buPROPion XL (WELLBUTRIN XL) 150 mg tablet, she will continue to monitor her mood and follow up with her care provider for reevaluation/adjustment of medications if warranted. I have reviewed the relationship between mood as it relates to sleep-disordered breathing. 3. Hypertension -  continue on current regimen - amLODIPine (NORVASC) 10 mg tablet and hydroCHLOROthiazide (HYDRODIURIL) 25 mg tablet, she will continue to monitor her BP and follow up with her primary care provider for reevaluation/adjustment of medications if warranted. I have reviewed the relationship between hypertension as it relates to sleep-disordered breathing. 4. Recommended a dedicated weight loss program through appropriate diet and exercise regimen as significant weight reduction has been shown to reduce severity of obstructive sleep apnea. SUBJECTIVE/OBJECTIVE:    Verner Simpson is an 38 y.o. female referred for evaluation for a sleep disorder. She complains of excessive daytime sleepiness associated with falling asleep while at work, reading, watching television. Symptoms began 2 years ago, gradually worsening since that time. She usually can fall asleep in 30 minutes. She sleeps with her 3year old son. She denies of symptoms indicative of cataplexy, sleep paralysis or sleep related hallucinations. She denies of a history of unusual movements occurring during sleep. Verner Simpson does not wake up frequently at night. She is bothered by waking up too early and left unable to get back to sleep.  She actually sleeps about 7 hours at night and wakes up about 3 times during the night. She does work shifts: First Shift. Betty indicates she does get too little sleep at night. Her bedtime is 2200. She awakens at 0800. She does take naps. She takes 4 naps a week lasting 2. She has the following observed behaviors: Light snoring, Bedwetting; Nightmares (sweating). Other remarks: The patient has not undergone diagnostic testing for the current problems. Review of Systems:  Constitutional:  No significant weight loss or weight gain  Eyes:  No blurred vision  CVS:  No significant chest pain  Pulm:  No significant shortness of breath  GI:  No significant nausea or vomiting  :  + significant nocturia  Musculoskeletal:  No significant joint pain at night  Skin:  No significant rashes  Neuro:  No significant dizziness   Psych:  No active mood issues    Sleep Review of Systems: notable for Positive difficulty falling asleep; Positive awakenings at night; Positive perceived regular dreaming; Positive nightmares; Positive  early morning headaches; Negative  memory problems; Negative  concentration issues; Negative caffeine;  Negative alcohol;   Negative history of any automobile or occupational accidents due to daytime drowsiness. [place] Sleepiness Score: 12   and Modified F.O.S.Q. Score Total / 2: 15.5    Visit Vitals  BP (!) 129/91 (BP 1 Location: Left upper arm, BP Patient Position: Sitting, BP Cuff Size: Adult)   Pulse 88   Ht 5' 5\" (1.651 m)   Wt 182 lb 1.6 oz (82.6 kg)   SpO2 99%   BMI 30.30 kg/m²    Neck circ.  in \"inches\": 14      General:   Alert, oriented, not in acute distress   Eyes:  Anicteric Sclerae; intact EOM's   Nose:  No obvious nasal septum deviation    Oropharynx:   Mallampati score 4, thick tongue base, uvula not seen due to low-lying soft palate, narrow tonsilo-pharyngeal pilars, tongue scalloped   Neck:   midline trachea,  no JVD   Chest/Lungs:  symmetrical lung expansion ,clear lung fields on auscultation    CVS:  Normal rate, regular rhythm    Extremities:  No obvious rashes, absent edema    Neuro:  No focal deficits; No obvious tremor    Psych:  Normal eye contact; normal  affect, normal countenance          Dolores Cadena MD, FAASM  Diplomate American Board of Sleep Medicine  Diplomate in Sleep Medicine - ABP    Electronically signed.  05/01/23

## 2023-05-03 ENCOUNTER — PREP FOR PROCEDURE (OUTPATIENT)
Age: 43
End: 2023-05-03

## 2023-05-03 DIAGNOSIS — G47.33 OBSTRUCTIVE SLEEP APNEA (ADULT) (PEDIATRIC): Primary | ICD-10-CM

## 2023-05-22 ENCOUNTER — TELEPHONE (OUTPATIENT)
Facility: HOSPITAL | Age: 43
End: 2023-05-22

## 2023-05-22 NOTE — TELEPHONE ENCOUNTER
Specialty Medication Service    Date: 5/22/2023  Patient's Name: Cleo Nolen YOB: 1980            _____________________________________________________________________________________________    Patient no longer is taking SMS formulary medication (medication discontinued last fill 11/2022). Patient is no longer enrolled in SMS program. No further outreach planned at this time.     Ashish Ryan, PharmD [facility]  Ambulatory Clinical Pharmacist  Specialty Medication Services  Phone: [phone] option #4  Fax: [phone]    For Pharmacy Admin Tracking Only    Program: [facility]  CPA in place:  Yes    Time Spent (min): 10

## 2023-05-26 ENCOUNTER — HOSPITAL ENCOUNTER (EMERGENCY)
Facility: HOSPITAL | Age: 43
Discharge: HOME | End: 2023-05-27
Attending: EMERGENCY MEDICINE
Payer: COMMERCIAL

## 2023-05-26 VITALS
DIASTOLIC BLOOD PRESSURE: 87 MMHG | HEART RATE: 87 BPM | BODY MASS INDEX: 30.32 KG/M2 | RESPIRATION RATE: 18 BRPM | WEIGHT: 182 LBS | HEIGHT: 65 IN | SYSTOLIC BLOOD PRESSURE: 134 MMHG | TEMPERATURE: 98.1 F

## 2023-05-26 DIAGNOSIS — M54.42 ACUTE LEFT-SIDED LOW BACK PAIN WITH LEFT-SIDED SCIATICA: Primary | ICD-10-CM

## 2023-05-26 LAB
APPEARANCE UR: CLEAR
BACTERIA URNS QL MICRO: ABNORMAL /HPF
BILIRUB UR QL: NEGATIVE
COLOR UR: ABNORMAL
EPITH CASTS URNS QL MICRO: ABNORMAL /LPF
GLUCOSE UR STRIP.AUTO-MCNC: NEGATIVE MG/DL
HCG UR QL: NEGATIVE
HGB UR QL STRIP: ABNORMAL
KETONES UR QL STRIP.AUTO: NEGATIVE MG/DL
LEUKOCYTE ESTERASE UR QL STRIP.AUTO: NEGATIVE
NITRITE UR QL STRIP.AUTO: NEGATIVE
PH UR STRIP: 7 (ref 5–8)
PROT UR STRIP-MCNC: ABNORMAL MG/DL
RBC #/AREA URNS HPF: ABNORMAL /HPF
SP GR UR REFRACTOMETRY: 1.02 (ref 1–1.03)
UROBILINOGEN UR QL STRIP.AUTO: 0.2 EU/DL (ref 0.2–1)
WBC URNS QL MICRO: ABNORMAL /HPF (ref 0–4)

## 2023-05-26 PROCEDURE — 96372 THER/PROPH/DIAG INJ SC/IM: CPT

## 2023-05-26 PROCEDURE — 81001 URINALYSIS AUTO W/SCOPE: CPT

## 2023-05-26 PROCEDURE — 6360000002 HC RX W HCPCS: Performed by: EMERGENCY MEDICINE

## 2023-05-26 PROCEDURE — 99284 EMERGENCY DEPT VISIT MOD MDM: CPT

## 2023-05-26 PROCEDURE — 81025 URINE PREGNANCY TEST: CPT

## 2023-05-26 RX ORDER — KETOROLAC TROMETHAMINE 30 MG/ML
60 INJECTION, SOLUTION INTRAMUSCULAR; INTRAVENOUS ONCE
Status: COMPLETED | OUTPATIENT
Start: 2023-05-26 | End: 2023-05-26

## 2023-05-26 RX ADMIN — KETOROLAC TROMETHAMINE 60 MG: 30 INJECTION, SOLUTION INTRAMUSCULAR; INTRAVENOUS at 23:23

## 2023-05-26 ASSESSMENT — PAIN - FUNCTIONAL ASSESSMENT: PAIN_FUNCTIONAL_ASSESSMENT: 0-10

## 2023-05-26 ASSESSMENT — PAIN DESCRIPTION - DESCRIPTORS: DESCRIPTORS: ACHING;NAGGING

## 2023-05-26 ASSESSMENT — PAIN DESCRIPTION - ORIENTATION: ORIENTATION: LEFT

## 2023-05-26 ASSESSMENT — PAIN SCALES - GENERAL: PAINLEVEL_OUTOF10: 5

## 2023-05-26 ASSESSMENT — PAIN DESCRIPTION - LOCATION: LOCATION: FLANK;BACK;LEG

## 2023-05-27 PROCEDURE — 6370000000 HC RX 637 (ALT 250 FOR IP): Performed by: EMERGENCY MEDICINE

## 2023-05-27 RX ORDER — LIDOCAINE 4 G/G
1 PATCH TOPICAL
Status: DISCONTINUED | OUTPATIENT
Start: 2023-05-27 | End: 2023-05-27 | Stop reason: HOSPADM

## 2023-05-27 RX ORDER — LIDOCAINE 50 MG/G
1 PATCH TOPICAL DAILY
Qty: 10 PATCH | Refills: 0 | Status: SHIPPED | OUTPATIENT
Start: 2023-05-27 | End: 2023-06-06

## 2023-05-27 RX ORDER — CYCLOBENZAPRINE HCL 10 MG
10 TABLET ORAL 3 TIMES DAILY PRN
Qty: 12 TABLET | Refills: 0 | Status: SHIPPED | OUTPATIENT
Start: 2023-05-27 | End: 2023-06-06

## 2023-05-27 NOTE — ED NOTES
I have reviewed discharge instructions with the patient. The patient verbalized understanding.       Valeriy Madera RN  05/27/23 0010

## 2023-05-27 NOTE — ED PROVIDER NOTES
mis-transcribed.)    Laureano Charlton MD (electronically signed)  Emergency Attending Physician / Physician Assistant / Nurse Practitioner              Leah Hurtado MD  05/27/23 0005

## 2023-05-27 NOTE — ED NOTES
Pt with c/o left sided lower back pain that radiates down the left leg that began yesterday. She states the pain has been getting worse. Denies any recent falls/injuries. Denies any bowel complaints. Denies pain/burning with urination and hematuria. She is AAO x 4 in NAD. Respirations regular/unlabored. Denies additional complaints.       Catarino Ellis, LISSETTE  05/26/23 9150

## 2023-05-27 NOTE — ED TRIAGE NOTES
Pt arrived to ED in wheelchair with cc of left side and back pain that runs down her left leg. Reports pain started yesterday but increased around 4 pm today. Reports some diarrhea yesterday and today. Denies nausea or vomiting. Denies dysuria or hematuria. Reports being on Depo shot so does not get menstrual cycles. Reports pain 5/10 with movement and 4/10 when sitting reports pain is a constant ache/ irritation.

## 2023-06-06 ENCOUNTER — HOSPITAL ENCOUNTER (OUTPATIENT)
Facility: HOSPITAL | Age: 43
Discharge: HOME | End: 2023-06-09
Payer: COMMERCIAL

## 2023-06-06 VITALS
SYSTOLIC BLOOD PRESSURE: 123 MMHG | DIASTOLIC BLOOD PRESSURE: 79 MMHG | HEART RATE: 86 BPM | TEMPERATURE: 98.1 F | OXYGEN SATURATION: 99 %

## 2023-06-06 PROCEDURE — 95810 POLYSOM 6/> YRS 4/> PARAM: CPT | Performed by: INTERNAL MEDICINE

## 2023-06-07 ENCOUNTER — HOSPITAL ENCOUNTER (OUTPATIENT)
Facility: HOSPITAL | Age: 43
Discharge: HOME | End: 2023-06-10
Payer: COMMERCIAL

## 2023-06-07 DIAGNOSIS — G47.419 NARCOLEPSY WITHOUT CATAPLEXY: Primary | ICD-10-CM

## 2023-06-07 PROCEDURE — 95805 MULTIPLE SLEEP LATENCY TEST: CPT

## 2023-06-07 NOTE — PROGRESS NOTES
Orders Placed This Encounter   Procedures    10-Drug Screen W/Conf, Urine     Standing Status:   Future     Standing Expiration Date:   6/7/2024

## 2023-06-07 NOTE — PROGRESS NOTES
[facility] [place]. [place], [address]  Tel.  [phone]  Fax. [address] [facility] 60  [place], [address] [facility]  Tel.  [phone]  Fax. [phone] [address]  Tel.  [phone]  Fax. [phone]     Sleep Study Technical Notes  Disclaimer:  all notes have not been confirmed by interpreting physician      PRE-Test:  Jamey Spann (: 1980) arrived in the lobby. Patient was greeted, temperature checked (98.1 ) and screening questions asked. The patient was taken directly to their assigned room. BP (129/79) and SpO2 (99%) were taken. Procedure was explained to the patient and questions were answered. Pt expressed understanding. Electrodes were applied without incident. The patient was placed in bed and the study was started. Acquisition Notes:  Lights off: 10:46pm  Sleep onset: 11:03  Lights on: 5:57am  Other comments: The study ordered was a PSG with an order for a MSLT in the morning. The hook-up was performed without issue. The Pt was placed in bed and lights were out at 10:46pm. Sleep onset occurred around 11:03pm. During the study stages N1, N2 and REM were noted. The Pt slept on both sides and supine. Mild snoring was heard. Lights were on at 5:58am.    POST Test:  Patient was awakened. Electrodes were removed. The patient was discharged after completing the Post Questionnaire. Patient stated that they were alert and ok to drive. Equipment and room cleaned per infection control policy.

## 2023-06-10 LAB
AMPHETAMINES UR QL SCN: NEGATIVE NG/ML
BARBITURATES UR QL SCN: NEGATIVE NG/ML
BENZODIAZ UR QL SCN: NEGATIVE NG/ML
BZE UR QL SCN: NEGATIVE NG/ML
CANNABINOIDS UR QL SCN: NEGATIVE NG/ML
Lab: NORMAL
OPIATES UR QL SCN: NEGATIVE NG/ML
PCP UR QL SCN: NEGATIVE NG/ML

## 2023-06-20 ENCOUNTER — TELEPHONE (OUTPATIENT)
Age: 43
End: 2023-06-20

## 2023-07-03 ENCOUNTER — TELEMEDICINE (OUTPATIENT)
Age: 43
End: 2023-07-03
Payer: COMMERCIAL

## 2023-07-03 DIAGNOSIS — G47.11 IDIOPATHIC HYPERSOMNIA: Primary | ICD-10-CM

## 2023-07-03 PROCEDURE — 99214 OFFICE O/P EST MOD 30 MIN: CPT | Performed by: INTERNAL MEDICINE

## 2023-07-03 RX ORDER — ARMODAFINIL 150 MG/1
150 TABLET ORAL DAILY
Qty: 30 TABLET | Refills: 2 | Status: SHIPPED | OUTPATIENT
Start: 2023-07-03 | End: 2023-10-01

## 2023-07-03 NOTE — PROGRESS NOTES
[address] [facility] [place], [address]  Tel.  [phone]    Fax. [address] [facility], [address] [place] [address]  Tel.  [phone]    Fax. [phone]     [address]  Tel.  [phone]    Fax. [phone]       Deedee Vela is a 38y.o. year old female seen for evaluation of a sleep disorder. ASSESSMENT/PLAN:     Diagnosis Orders   1. Idiopathic hypersomnia  Armodafinil (NUVIGIL) 150 MG TABS tablet          Patient has a history and examination consistent with the diagnosis of sleep apnea. Return for for follow-up in 3 months or as needed. Orders Placed This Encounter    Armodafinil (NUVIGIL) 150 MG TABS tablet     Sig: Take 1 tablet by mouth daily for 90 days. Max Daily Amount: 150 mg     Dispense:  30 tablet     Refill:  2       * The patient was counseled regarding proper sleep hygiene, with emphasis on ensuring sufficient total sleep time; safe driving and the benefits of exercise and weight loss. * All of her questions were addressed. 2. Recommended a dedicated weight loss program through appropriate diet and exercise regimen as significant weight reduction has been shown to reduce severity of obstructive sleep apnea. SUBJECTIVE/OBJECTIVE:    Deedee Vela is an 38 y.o. female referred for evaluation for a sleep disorder. She complains of excessive daytime sleepiness associated with feeling sleepy after sleeping 6-7 hours on a nightly basis. Symptoms began 2 years ago, gradually worsening since that time. She usually can fall asleep in 45-60 minutes. Family or house members note snoring. She finds it hard to wake up in the morning and feels tired and sleepy during the day. She reports that she would sleep for hours  if at home. She used to take naps previously but avoiding naps has helped her sleep faster but she still feels sleepy during the day.  She

## 2023-07-05 ENCOUNTER — TELEPHONE (OUTPATIENT)
Age: 43
End: 2023-07-05

## 2023-07-05 DIAGNOSIS — N89.8 VAGINAL ITCHING: Primary | ICD-10-CM

## 2023-07-05 RX ORDER — FLUCONAZOLE 150 MG/1
150 TABLET ORAL ONCE
Qty: 1 TABLET | Refills: 1 | Status: SHIPPED | OUTPATIENT
Start: 2023-07-05 | End: 2023-07-05

## 2023-07-05 NOTE — TELEPHONE ENCOUNTER
Patient states she believes she has the start of a yeast infection. Mild vaginal itching and discharge. Diflucan will be sent to the pharmacy. Advised the patient she would need evaluation if symptoms do not improve.

## 2023-08-01 RX ORDER — AMLODIPINE BESYLATE 10 MG/1
TABLET ORAL
Qty: 90 TABLET | Refills: 0 | Status: SHIPPED | OUTPATIENT
Start: 2023-08-01

## 2023-08-01 RX ORDER — BUPROPION HYDROCHLORIDE 150 MG/1
TABLET ORAL
Qty: 90 TABLET | Refills: 0 | Status: SHIPPED | OUTPATIENT
Start: 2023-08-01

## 2023-08-01 RX ORDER — ESCITALOPRAM OXALATE 20 MG/1
TABLET ORAL
Qty: 90 TABLET | Refills: 0 | Status: SHIPPED | OUTPATIENT
Start: 2023-08-01

## 2023-08-13 RX ORDER — ERGOCALCIFEROL 1.25 MG/1
CAPSULE ORAL
Qty: 12 CAPSULE | Refills: 1 | Status: SHIPPED | OUTPATIENT
Start: 2023-08-13

## 2023-09-01 ENCOUNTER — OFFICE VISIT (OUTPATIENT)
Age: 43
End: 2023-09-01
Payer: COMMERCIAL

## 2023-09-01 VITALS
HEART RATE: 82 BPM | WEIGHT: 181.6 LBS | SYSTOLIC BLOOD PRESSURE: 135 MMHG | BODY MASS INDEX: 30.22 KG/M2 | DIASTOLIC BLOOD PRESSURE: 83 MMHG

## 2023-09-01 DIAGNOSIS — Z01.419 ENCOUNTER FOR GYNECOLOGICAL EXAMINATION: Primary | ICD-10-CM

## 2023-09-01 DIAGNOSIS — N89.8 VAGINAL DISCHARGE: ICD-10-CM

## 2023-09-01 PROCEDURE — 99396 PREV VISIT EST AGE 40-64: CPT | Performed by: OBSTETRICS & GYNECOLOGY

## 2023-09-01 PROCEDURE — 3075F SYST BP GE 130 - 139MM HG: CPT | Performed by: OBSTETRICS & GYNECOLOGY

## 2023-09-01 PROCEDURE — 3079F DIAST BP 80-89 MM HG: CPT | Performed by: OBSTETRICS & GYNECOLOGY

## 2023-09-01 RX ORDER — VALACYCLOVIR HYDROCHLORIDE 500 MG/1
500 TABLET, FILM COATED ORAL 2 TIMES DAILY
Status: CANCELLED | OUTPATIENT
Start: 2023-09-01

## 2023-09-01 RX ORDER — MEDROXYPROGESTERONE ACETATE 150 MG/ML
150 INJECTION, SUSPENSION INTRAMUSCULAR
Qty: 1 ML | Refills: 4 | Status: SHIPPED | OUTPATIENT
Start: 2023-09-01

## 2023-09-01 NOTE — PROGRESS NOTES
Yolande Meza is a 41 y.o. female returns for an annual exam     Chief Complaint   Patient presents with    Annual Exam       No LMP recorded. Patient has had an injection. Her periods are spotting in flow and minimal to none using DMPA. She does not have dysmenorrhea. Problems: pt reports occasional cramping & spotting with depo during ovulation. Pt reports recurrent yeast infections, managing with Monistat 7day & boric acid, pt reports slight discharge with odor yesterday so she is not sure if the discharge is out of balance. Birth Control: depo. Last Pap: normal obtained 2/24/2021  She does not have a history of RICHY 2, 3 or cervical cancer. Last Mammogram:  missed mammo today but is r/s for tuesday .     Last Bone Density: never obtained  Last colonoscopy: wnl in 2016      Examination chaperoned by Tristen Shanks MA.
identified    Genitourinary  External Genitalia: normal appearance for age, no discharge present, no tenderness present, no inflammatory lesions present, no masses present  Vagina: normal vaginal vault without central or paravaginal defects, thin white discharge present, no inflammatory lesions present, no masses present  Bladder: non-tender to palpation  Urethra: appears normal  Cervix: normal   Uterus: normal size, shape and consistency  Adnexa: no adnexal tenderness present, no adnexal masses present  Perineum: perineum within normal limits, no evidence of trauma, no rashes or skin lesions present  Anus: anus within normal limits, no hemorrhoids present  Inguinal Lymph Nodes: no lymphadenopathy present    Skin  General Inspection: no rash, no lesions identified    Neurologic/Psychiatric  Mental Status:  Orientation: grossly oriented to person, place and time  Mood and Affect: mood normal, affect appropriate    Assessment:  41 y.o. [id number] for well woman exam  Her current medical status is satisfactory with no evidence of significant gynecologic issues. Encounter Diagnoses   Name Primary? Encounter for gynecological examination Yes    Vaginal discharge        Plan:  I recommended follow up one year for routine annual gynecologic exam or sooner prn  Patient should follow up with a primary care physician for chronic medical problems and evaluation of non-gynecologic concerns and to please contact our office with any GYN questions or concerns. I recommend STD testing per CDC guidelines and at patient request.   Follow up: well woman exam one year  Continue depo  Discussed risks, benefits and alternatives of OCP/nuvaring/patch: including but not limited to dvt/pe/mi/cva/ca/gi risks and that smoking, increasing age and other health conditions can increase these risks. We discussed potential causes of vaginal discharge/irritation. We discussed good vulvar hygiene.   Recommended avoid vaginal

## 2023-09-11 ENCOUNTER — TRANSCRIBE ORDERS (OUTPATIENT)
Facility: HOSPITAL | Age: 43
End: 2023-09-11

## 2023-09-11 DIAGNOSIS — R92.8 ABNORMAL MAMMOGRAM: Primary | ICD-10-CM

## 2023-09-12 ENCOUNTER — TELEPHONE (OUTPATIENT)
Age: 43
End: 2023-09-12

## 2023-09-12 DIAGNOSIS — R92.8 ABNORMAL MAMMOGRAM: Primary | ICD-10-CM

## 2023-09-13 NOTE — TELEPHONE ENCOUNTER
Calling central scheduling/ to cancel right breast ultrasound order & appt, pt only needs dx left mammo & possible u/s. Central scheduling said there was an order in the chart so John Campos scheduled pt for those procedures. Advised no on in our office placed those orders & the orders were incorrect. On my end it looks like John Campos placed the orders. There was a note for the appointment that it was scheduled based on the letter pt received. Correct order for left diagnostic mammogram & left ultrasound placed by me today. I tried calling pt to inform her what procedure she would be having (left dx mammogram & possible left breast ultrasound) but voicemail box was full. I will send pt a [facility] message.

## 2023-09-18 ENCOUNTER — TELEMEDICINE (OUTPATIENT)
Age: 43
End: 2023-09-18
Payer: COMMERCIAL

## 2023-09-18 DIAGNOSIS — M54.41 CHRONIC RIGHT-SIDED LOW BACK PAIN WITH RIGHT-SIDED SCIATICA: Primary | ICD-10-CM

## 2023-09-18 DIAGNOSIS — I10 PRIMARY HYPERTENSION: ICD-10-CM

## 2023-09-18 DIAGNOSIS — E55.9 HYPOVITAMINOSIS D: ICD-10-CM

## 2023-09-18 DIAGNOSIS — R53.82 CHRONIC FATIGUE: ICD-10-CM

## 2023-09-18 DIAGNOSIS — E78.00 PURE HYPERCHOLESTEROLEMIA: ICD-10-CM

## 2023-09-18 DIAGNOSIS — G89.29 CHRONIC RIGHT-SIDED LOW BACK PAIN WITH RIGHT-SIDED SCIATICA: Primary | ICD-10-CM

## 2023-09-18 PROBLEM — Z34.90 PREGNANCY: Status: RESOLVED | Noted: 2018-04-02 | Resolved: 2023-09-18

## 2023-09-18 PROCEDURE — 99214 OFFICE O/P EST MOD 30 MIN: CPT | Performed by: STUDENT IN AN ORGANIZED HEALTH CARE EDUCATION/TRAINING PROGRAM

## 2023-09-18 RX ORDER — HYDROCHLOROTHIAZIDE 25 MG/1
25 TABLET ORAL DAILY
Qty: 90 TABLET | Refills: 1 | Status: SHIPPED | OUTPATIENT
Start: 2023-09-18

## 2023-09-18 SDOH — ECONOMIC STABILITY: FOOD INSECURITY: WITHIN THE PAST 12 MONTHS, YOU WORRIED THAT YOUR FOOD WOULD RUN OUT BEFORE YOU GOT MONEY TO BUY MORE.: NEVER TRUE

## 2023-09-18 SDOH — ECONOMIC STABILITY: FOOD INSECURITY: WITHIN THE PAST 12 MONTHS, THE FOOD YOU BOUGHT JUST DIDN'T LAST AND YOU DIDN'T HAVE MONEY TO GET MORE.: NEVER TRUE

## 2023-09-18 SDOH — ECONOMIC STABILITY: INCOME INSECURITY: HOW HARD IS IT FOR YOU TO PAY FOR THE VERY BASICS LIKE FOOD, HOUSING, MEDICAL CARE, AND HEATING?: NOT HARD AT ALL

## 2023-09-18 SDOH — ECONOMIC STABILITY: HOUSING INSECURITY
IN THE LAST 12 MONTHS, WAS THERE A TIME WHEN YOU DID NOT HAVE A STEADY PLACE TO SLEEP OR SLEPT IN A SHELTER (INCLUDING NOW)?: NO

## 2023-09-18 NOTE — PROGRESS NOTES
Progress Note    she is a 41y.o. year old female who presents for evaluation Results (F/up on sleep study) and Orders (Possible PT for back pain)  . Assessment/ Plan:     1. Chronic right-sided low back pain with right-sided sciatica  [facility] - Physical Therapy, [address] [place] [address] MD Chuck, Orthopedic Surgery (back, neck, spine), [place]  2. Primary hypertension  Assessment & Plan:  Currently out of HCTZ, blood pressure elevated  Restart HCTZ  Orders:  -     hydroCHLOROthiazide (HYDRODIURIL) 25 MG tablet; Take 1 tablet by mouth daily, Disp-90 tablet, R-1Normal  -     Comprehensive Metabolic Panel; Future  3. Chronic fatigue  Assessment & Plan:  Sleep medicine specialist has prescribed Nuvigil for idiopathic hypersomnia. Reviewed risks and benefits of medication. Reviewed nonmedication coping strategies for fatigue  4. Hypovitaminosis D  -     Vitamin D 25 Hydroxy; Future  5. Pure hypercholesterolemia  Assessment & Plan:  Lab Results   Component Value Date    CHOL 247 (H) 03/24/2023    TRIG 82 03/24/2023    HDL 55 03/24/2023    LDLCALC 175.6 (H) 03/24/2023    LABVLDL 16.4 03/24/2023    CHOLHDLRATIO 4.5 03/24/2023   LDL consistently rising over the last 3 years  Healthy lifestyle measures encouraged  Reassess with labs  Reviewed that LDL >190 likely due to familial hyperlipidemia and medication would be warranted  Orders:  -     Lipid Panel; Future         Return in about 6 months (around 3/18/2024) for follow-up blood pressure and cholesterol with fasting labs. I have discussed the diagnosis with the patient and the intended plan as seen in the above orders. Medication Side Effects and Warnings were discussed with patient  The patient was instructed to access after-visit summary via D[employer] and questions were answered concerning future plans. Patient conveyed understanding of plan.            Subjective:     Chief Complaint   Patient presents with    Results

## 2023-09-18 NOTE — PROGRESS NOTES
Eitan Watts is a 41 y.o. female , id x 2(name and ). Reviewed questionnaires, and  medications. Chief Complaint   Patient presents with    Results     F/up on sleep study    Orders     Possible PT for back pain         1. Have you been to the ER, urgent care clinic since your last visit? Hospitalized since your last visit? No    2. Have you seen or consulted any other health care providers outside of the [address] [place] [address] since your last visit? Include any pap smears or colon screening.  No

## 2023-09-18 NOTE — ASSESSMENT & PLAN NOTE
Lab Results   Component Value Date    CHOL 247 (H) 03/24/2023    TRIG 82 03/24/2023    HDL 55 03/24/2023    LDLCALC 175.6 (H) 03/24/2023    LABVLDL 16.4 03/24/2023    CHOLHDLRATIO 4.5 03/24/2023   LDL consistently rising over the last 3 years  Healthy lifestyle measures encouraged  Reassess with labs  Reviewed that LDL >190 likely due to familial hyperlipidemia and medication would be warranted

## 2023-09-18 NOTE — ASSESSMENT & PLAN NOTE
Sleep medicine specialist has prescribed Nuvigil for idiopathic hypersomnia. Reviewed risks and benefits of medication.   Reviewed nonmedication coping strategies for fatigue

## 2023-09-18 NOTE — PATIENT INSTRUCTIONS
Aide at [facility]   [address], [address] [facility], [address] [facility]   [phone]

## 2023-09-20 DIAGNOSIS — I10 PRIMARY HYPERTENSION: ICD-10-CM

## 2023-09-20 DIAGNOSIS — E55.9 HYPOVITAMINOSIS D: ICD-10-CM

## 2023-09-20 DIAGNOSIS — E78.00 PURE HYPERCHOLESTEROLEMIA: ICD-10-CM

## 2023-09-21 LAB
25(OH)D3 SERPL-MCNC: 57.2 NG/ML (ref 30–100)
ALBUMIN SERPL-MCNC: 3.4 G/DL (ref 3.5–5)
ALBUMIN/GLOB SERPL: 0.9 (ref 1.1–2.2)
ALP SERPL-CCNC: 116 U/L (ref 45–117)
ALT SERPL-CCNC: 20 U/L (ref 12–78)
ANION GAP SERPL CALC-SCNC: 4 MMOL/L (ref 5–15)
AST SERPL-CCNC: 17 U/L (ref 15–37)
BILIRUB SERPL-MCNC: 0.3 MG/DL (ref 0.2–1)
BUN SERPL-MCNC: 13 MG/DL (ref 6–20)
BUN/CREAT SERPL: 15 (ref 12–20)
CALCIUM SERPL-MCNC: 9.4 MG/DL (ref 8.5–10.1)
CHLORIDE SERPL-SCNC: 109 MMOL/L (ref 97–108)
CHOLEST SERPL-MCNC: 218 MG/DL
CO2 SERPL-SCNC: 26 MMOL/L (ref 21–32)
COMMENT:: NORMAL
CREAT SERPL-MCNC: 0.87 MG/DL (ref 0.55–1.02)
GLOBULIN SER CALC-MCNC: 4 G/DL (ref 2–4)
GLUCOSE SERPL-MCNC: 88 MG/DL (ref 65–100)
HDLC SERPL-MCNC: 58 MG/DL
HDLC SERPL: 3.8 (ref 0–5)
LDLC SERPL CALC-MCNC: 147.6 MG/DL (ref 0–100)
POTASSIUM SERPL-SCNC: 4.2 MMOL/L (ref 3.5–5.1)
PROT SERPL-MCNC: 7.4 G/DL (ref 6.4–8.2)
SODIUM SERPL-SCNC: 139 MMOL/L (ref 136–145)
SPECIMEN HOLD: NORMAL
TRIGL SERPL-MCNC: 62 MG/DL
VLDLC SERPL CALC-MCNC: 12.4 MG/DL

## 2023-09-22 LAB
CHOLEST SERPL-MCNC: 204 MG/DL
GLUCOSE SERPL-MCNC: 94 MG/DL (ref 65–100)
HDLC SERPL-MCNC: 52 MG/DL
LDLC SERPL CALC-MCNC: 132.4 MG/DL (ref 0–100)
TRIGL SERPL-MCNC: 98 MG/DL

## 2023-09-29 ENCOUNTER — APPOINTMENT (OUTPATIENT)
Facility: HOSPITAL | Age: 43
End: 2023-09-29
Attending: OBSTETRICS & GYNECOLOGY
Payer: COMMERCIAL

## 2023-09-29 ENCOUNTER — HOSPITAL ENCOUNTER (OUTPATIENT)
Facility: HOSPITAL | Age: 43
End: 2023-09-29
Attending: OBSTETRICS & GYNECOLOGY
Payer: COMMERCIAL

## 2023-09-29 ENCOUNTER — HOSPITAL ENCOUNTER (OUTPATIENT)
Facility: HOSPITAL | Age: 43
Discharge: HOME | End: 2023-09-29
Attending: OBSTETRICS & GYNECOLOGY
Payer: COMMERCIAL

## 2023-09-29 VITALS — BODY MASS INDEX: 30.16 KG/M2 | HEIGHT: 65 IN | WEIGHT: 181 LBS

## 2023-09-29 DIAGNOSIS — R92.8 ABNORMAL MAMMOGRAM: ICD-10-CM

## 2023-09-29 PROCEDURE — G0279 TOMOSYNTHESIS, MAMMO: HCPCS

## 2023-10-05 ENCOUNTER — HOSPITAL ENCOUNTER (OUTPATIENT)
Facility: HOSPITAL | Age: 43
Setting detail: RECURRING SERIES
Discharge: HOME | End: 2023-10-08
Payer: COMMERCIAL

## 2023-10-05 PROCEDURE — 97161 PT EVAL LOW COMPLEX 20 MIN: CPT

## 2023-10-05 NOTE — THERAPY EVALUATION
[address]  [address], [address], [address]  Phone: [phone]    Fax: [phone]            PHYSICAL THERAPY - EVALUATION/PLAN OF CARE NOTE (updated 3/23)      Date: 10/5/2023          Patient Name:  Farheen Fuchs :  1980   Medical   Diagnosis:  Chronic right-sided low back pain with right-sided sciatica [M54.41, G89.29] Treatment Diagnosis:  M54.31  SCIATICA, RIGHT SIDE and M54.59  OTHER LOWER BACK PAIN    Referral Source:  Roger Taylor MD Provider #:  [id number]                Insurance: Payor: Izzy John / Plan: Cristin Edgar / Product Type: *No Product type* /      Patient  verified yes     Visit #   Current  / Total 1 12=poc   Time   In / Out 1000 1045   Total Treatment Time 45   Total Timed Codes 0         SUBJECTIVE  Pain Level (0-10 scale): 2  []constant []intermittent []improving []worsening []no change since onset    Any medication changes, allergies to medications, adverse drug reactions, diagnosis change, or new procedure performed?: [x] No    [] Yes (see summary sheet for update)  Medications: Verified on Patient Summary List    Subjective functional status/changes:     I probably twist a lot in my ADLs. I will try to be more mindful of that and avoid that. Start of Care: 10/5/2023  Onset Date: may 2023  Current symptoms/Complaints: low back pain into all of right leg. Right this moment it is just in my lateral right calf. Mechanism of Injury: insidious  PLOF: wnl  Limitations to PLOF/Activity or Recreational Limitations: na  Work Hx: practice manager for [employer]  Living Situation: child  Mobility: wfl  Self Care: wnl  Previous Treatment/Compliance: unknown  PMHx/Surgical Hx/Comorbidites: htn is all I have.   Prior Hospitalization:  na  Barriers: []pain []Financial []time []transportation []Other:NA  Substance use: []Alcohol []Tobacco []other: NA  Pt Goals: RELIEF FROM

## 2023-10-11 ENCOUNTER — HOSPITAL ENCOUNTER (OUTPATIENT)
Facility: HOSPITAL | Age: 43
Setting detail: RECURRING SERIES
Discharge: HOME | End: 2023-10-14
Payer: COMMERCIAL

## 2023-10-11 PROCEDURE — 97110 THERAPEUTIC EXERCISES: CPT

## 2023-10-11 NOTE — PROGRESS NOTES
PHYSICAL THERAPY - DAILY TREATMENT NOTE (updated 3/23)      Date: 10/11/2023          Patient Name:  Anup Rendon :  1980   Medical   Diagnosis:  Chronic right-sided low back pain with right-sided sciatica [M54.41, G89.29] Treatment Diagnosis:  M54.59, G89.29  CHRONIC LOWER BACK PAIN    Referral Source:  Alicia Jimenez MD Insurance:   Payor: [employer] / Plan: Gloria Silva [facility] [address] [place] / Product Type: *No Product type* /                     Patient  verified yes     Visit #   Current  / Total 2 12   Time   In / Out 830 915   Total Treatment Time 45   Total Timed Codes 40         SUBJECTIVE    Pain Level (0-10 scale): 0    Any medication changes, allergies to medications, adverse drug reactions, diagnosis change, or new procedure performed?: [x] No    [] Yes (see summary sheet for update)  Medications: Verified on Patient Summary List    Subjective functional status/changes:     No sx right this min. But some sx with ADLs    OBJECTIVE      Therapeutic Procedures: Tx Min Billable or 1:1 Min (if diff from Tx Min) Procedure, Rationale, Specifics   40  34025 Therapeutic Exercise (timed):  increase ROM, strength, coordination, balance, and proprioception to improve patient's ability to progress to PLOF and address remaining functional goals. (see flow sheet as applicable)     Details if applicable:            Details if applicable:           Details if applicable:           Details if applicable:            Details if applicable:     40     Total Total           [x]  Patient Education billed concurrently with other procedures   [x] Review HEP  pullovers, LTR, DPS, HS stretch  [] Progressed/Changed HEP, detail:    [] Other detail:         Other Objective/Functional Measures      Pain Level at end of session (0-10 scale): 0      Assessment   \"Tolerated session well. Working towards my goals. \"  Patient will continue to benefit from skilled PT / OT services to modify and progress therapeutic interventions to

## 2023-10-17 ENCOUNTER — HOSPITAL ENCOUNTER (OUTPATIENT)
Facility: HOSPITAL | Age: 43
Setting detail: RECURRING SERIES
Discharge: HOME | End: 2023-10-20
Payer: COMMERCIAL

## 2023-10-17 PROCEDURE — 97110 THERAPEUTIC EXERCISES: CPT

## 2023-10-17 NOTE — PROGRESS NOTES
PHYSICAL THERAPY - DAILY TREATMENT NOTE (updated 3/23)      Date: 10/17/2023          Patient Name:  Hebert Dickson :  1980   Medical   Diagnosis:  Chronic right-sided low back pain with right-sided sciatica [M54.41, G89.29] Treatment Diagnosis:  M54.59, G89.29  CHRONIC LOWER BACK PAIN    Referral Source:  Cinthya Brown MD Insurance:   Payor: Yessy Mis / Plan: Korey Ramirez [employer] [place] / Product Type: *No Product type* /                     Patient  verified yes     Visit #   Current  / Total 3 12   Time   In / Out 230 315   Total Treatment Time 45   Total Timed Codes 40         SUBJECTIVE    Pain Level (0-10 scale): 1    Any medication changes, allergies to medications, adverse drug reactions, diagnosis change, or new procedure performed?: [x] No    [] Yes (see summary sheet for update)  Medications: Verified on Patient Summary List    Subjective functional status/changes:     No sx right this min. But I know it's there. OBJECTIVE      Therapeutic Procedures: Tx Min Billable or 1:1 Min (if diff from Tx Min) Procedure, Rationale, Specifics   40  17712 Therapeutic Exercise (timed):  increase ROM, strength, coordination, balance, and proprioception to improve patient's ability to progress to PLOF and address remaining functional goals. (see flow sheet as applicable)     Details if applicable:            Details if applicable:           Details if applicable:           Details if applicable:            Details if applicable:     40     Total Total           [x]  Patient Education billed concurrently with other procedures   [x] Review HEP  pullovers, LTR, DPS, HS stretch  [] Progressed/Changed HEP, detail:    [] Other detail:         Other Objective/Functional Measures      Pain Level at end of session (0-10 scale): 1      Assessment   \"Tolerated session well. Working towards my goals. \"  Patient will continue to benefit from skilled PT / OT services to modify and progress therapeutic interventions to

## 2023-10-19 ENCOUNTER — HOSPITAL ENCOUNTER (OUTPATIENT)
Facility: HOSPITAL | Age: 43
Setting detail: RECURRING SERIES
Discharge: HOME | End: 2023-10-22
Payer: COMMERCIAL

## 2023-10-19 PROCEDURE — 97110 THERAPEUTIC EXERCISES: CPT

## 2023-10-19 NOTE — PROGRESS NOTES
PHYSICAL THERAPY - DAILY TREATMENT NOTE (updated 3/23)      Date: 10/19/2023          Patient Name:  Shaun Mccoy :  1980   Medical   Diagnosis:  Chronic right-sided low back pain with right-sided sciatica [M54.41, G89.29] Treatment Diagnosis:  M54.59, G89.29  CHRONIC LOWER BACK PAIN    Referral Source:  Derik Baldwin MD Insurance:   Payor: [employer] / Plan: Binu Chun [facility] [employer] / Product Type: *No Product type* /                     Patient  verified yes     Visit #   Current  / Total 4 12   Time   In / Out 815 900   Total Treatment Time 45   Total Timed Codes 40         SUBJECTIVE    Pain Level (0-10 scale): 1    Any medication changes, allergies to medications, adverse drug reactions, diagnosis change, or new procedure performed?: [x] No    [] Yes (see summary sheet for update)  Medications: Verified on Patient Summary List    Subjective functional status/changes:     No sx right this min. But I know it's there. OBJECTIVE      Therapeutic Procedures: Tx Min Billable or 1:1 Min (if diff from Tx Min) Procedure, Rationale, Specifics   40  74456 Therapeutic Exercise (timed):  increase ROM, strength, coordination, balance, and proprioception to improve patient's ability to progress to PLOF and address remaining functional goals. (see flow sheet as applicable)     Details if applicable:            Details if applicable:           Details if applicable:           Details if applicable:            Details if applicable:     40     Total Total           [x]  Patient Education billed concurrently with other procedures   [x] Review HEP  pullovers, LTR, DPS, HS stretch  [] Progressed/Changed HEP, detail:    [] Other detail:         Other Objective/Functional Measures      Pain Level at end of session (0-10 scale): 1      Assessment   \"Tolerated session well. Working towards my goals. \"  Patient will continue to benefit from skilled PT / OT services to modify and progress therapeutic interventions to

## 2023-10-25 ENCOUNTER — HOSPITAL ENCOUNTER (OUTPATIENT)
Facility: HOSPITAL | Age: 43
Setting detail: RECURRING SERIES
Discharge: HOME | End: 2023-10-28
Payer: COMMERCIAL

## 2023-10-25 PROCEDURE — 97110 THERAPEUTIC EXERCISES: CPT

## 2023-10-25 NOTE — PROGRESS NOTES
PHYSICAL THERAPY - DAILY TREATMENT NOTE (updated 3/23)      Date: 10/25/2023          Patient Name:  Shaun Mccoy :  1980   Medical   Diagnosis:  Chronic right-sided low back pain with right-sided sciatica [M54.41, G89.29] Treatment Diagnosis:  M54.59, G89.29  CHRONIC LOWER BACK PAIN    Referral Source:  Derik Baldwin MD Insurance:   Payor: [employer] / Plan: Binu Chun [facility] [employer] / Product Type: *No Product type* /                     Patient  verified yes     Visit #   Current  / Total 5 12   Time   In / Out 830 915   Total Treatment Time 45   Total Timed Codes 45         SUBJECTIVE    Pain Level (0-10 scale): 2    Any medication changes, allergies to medications, adverse drug reactions, diagnosis change, or new procedure performed?: [x] No    [] Yes (see summary sheet for update)  Medications: Verified on Patient Summary List    Subjective functional status/changes:     No sx right this min. But I know it's there. OBJECTIVE      Therapeutic Procedures: Tx Min Billable or 1:1 Min (if diff from Tx Min) Procedure, Rationale, Specifics   45  49909 Therapeutic Exercise (timed):  increase ROM, strength, coordination, balance, and proprioception to improve patient's ability to progress to PLOF and address remaining functional goals. (see flow sheet as applicable)     Details if applicable:            Details if applicable:           Details if applicable:           Details if applicable:            Details if applicable:     45     Total Total           [x]  Patient Education billed concurrently with other procedures   [x] Review HEP  pullovers, LTR, DPS, HS stretch  [] Progressed/Changed HEP, detail:    [] Other detail:         Other Objective/Functional Measures      Pain Level at end of session (0-10 scale): 1      Assessment   \"Tolerated session well. Working towards my goals. \"  Patient will continue to benefit from skilled PT / OT services to modify and progress therapeutic interventions to Private Vehicle

## 2023-10-31 RX ORDER — AMLODIPINE BESYLATE 10 MG/1
10 TABLET ORAL DAILY
Qty: 90 TABLET | Refills: 0 | OUTPATIENT
Start: 2023-10-31

## 2023-10-31 RX ORDER — BUPROPION HYDROCHLORIDE 150 MG/1
TABLET ORAL
Qty: 90 TABLET | Refills: 1 | Status: SHIPPED | OUTPATIENT
Start: 2023-10-31

## 2023-10-31 RX ORDER — ESCITALOPRAM OXALATE 20 MG/1
20 TABLET ORAL DAILY
Qty: 90 TABLET | Refills: 0 | OUTPATIENT
Start: 2023-10-31

## 2023-11-01 RX ORDER — AMLODIPINE BESYLATE 10 MG/1
10 TABLET ORAL DAILY
Qty: 90 TABLET | Refills: 0 | OUTPATIENT
Start: 2023-11-01

## 2023-11-01 RX ORDER — ESCITALOPRAM OXALATE 20 MG/1
20 TABLET ORAL DAILY
Qty: 90 TABLET | Refills: 0 | OUTPATIENT
Start: 2023-11-01

## 2024-01-11 ENCOUNTER — HOSPITAL ENCOUNTER (EMERGENCY)
Facility: HOSPITAL | Age: 44
Discharge: HOME | End: 2024-01-11
Attending: EMERGENCY MEDICINE
Payer: COMMERCIAL

## 2024-01-11 ENCOUNTER — APPOINTMENT (OUTPATIENT)
Facility: HOSPITAL | Age: 44
End: 2024-01-11
Payer: COMMERCIAL

## 2024-01-11 VITALS
WEIGHT: 185 LBS | TEMPERATURE: 98.7 F | HEIGHT: 65 IN | HEART RATE: 118 BPM | OXYGEN SATURATION: 97 % | BODY MASS INDEX: 30.82 KG/M2 | DIASTOLIC BLOOD PRESSURE: 82 MMHG | SYSTOLIC BLOOD PRESSURE: 135 MMHG | RESPIRATION RATE: 18 BRPM

## 2024-01-11 DIAGNOSIS — R05.1 ACUTE COUGH: Primary | ICD-10-CM

## 2024-01-11 PROCEDURE — 71046 X-RAY EXAM CHEST 2 VIEWS: CPT

## 2024-01-11 PROCEDURE — 99283 EMERGENCY DEPT VISIT LOW MDM: CPT

## 2024-01-11 RX ORDER — BENZONATATE 100 MG/1
100 CAPSULE ORAL 2 TIMES DAILY PRN
Qty: 20 CAPSULE | Refills: 0 | Status: SHIPPED | OUTPATIENT
Start: 2024-01-11 | End: 2024-01-18

## 2024-01-11 ASSESSMENT — LIFESTYLE VARIABLES
HOW OFTEN DO YOU HAVE A DRINK CONTAINING ALCOHOL: NEVER
HOW MANY STANDARD DRINKS CONTAINING ALCOHOL DO YOU HAVE ON A TYPICAL DAY: PATIENT DOES NOT DRINK

## 2024-01-11 ASSESSMENT — PAIN - FUNCTIONAL ASSESSMENT: PAIN_FUNCTIONAL_ASSESSMENT: NONE - DENIES PAIN

## 2024-01-11 NOTE — ED TRIAGE NOTES
Pt arrives to ED for cough and congestion x 1 month and was started on doxycycline 12/15 from urgent care.    Pt returned to Urgent Care a week ago and dx'd with bronchitis and has been on prednisone x 1 week. Reports small improvement to cough.     Pt now complaining of fatigue and SOB.     Denies fevers.
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
## 2024-01-11 NOTE — ED NOTES
Patient does not appear to be in any acute distress/shows no evidence of clinical instability at this time.     Provider has reviewed discharge instructions with the patient/family.  The patient/family verbalized understanding instructions as well as need for follow up for any further symptoms.     Discharge papers given, education provided, and any questions answered. Patient discharged by provider.

## 2024-02-15 DIAGNOSIS — I10 PRIMARY HYPERTENSION: ICD-10-CM

## 2024-02-16 RX ORDER — HYDROCHLOROTHIAZIDE 25 MG/1
25 TABLET ORAL DAILY
Qty: 30 TABLET | Refills: 0 | Status: SHIPPED | OUTPATIENT
Start: 2024-02-16

## 2024-03-15 ENCOUNTER — OFFICE VISIT (OUTPATIENT)
Age: 44
End: 2024-03-15

## 2024-03-15 VITALS
TEMPERATURE: 98.9 F | WEIGHT: 188 LBS | OXYGEN SATURATION: 99 % | RESPIRATION RATE: 20 BRPM | HEART RATE: 101 BPM | BODY MASS INDEX: 31.32 KG/M2 | DIASTOLIC BLOOD PRESSURE: 84 MMHG | SYSTOLIC BLOOD PRESSURE: 121 MMHG | HEIGHT: 65 IN

## 2024-03-15 DIAGNOSIS — J02.9 SORE THROAT: Primary | ICD-10-CM

## 2024-03-15 DIAGNOSIS — R09.81 NASAL CONGESTION: ICD-10-CM

## 2024-03-15 LAB
EXP DATE SOLUTION: NORMAL
EXP DATE SWAB: NORMAL
EXPIRATION DATE: NORMAL
INFLUENZA A ANTIGEN, POC: NEGATIVE
INFLUENZA B ANTIGEN, POC: NEGATIVE
LOT NUMBER POC: NORMAL
LOT NUMBER SOLUTION: NORMAL
LOT NUMBER SWAB: NORMAL
SARS-COV-2 RNA, POC: NEGATIVE
STREP PYOGENES DNA, POC: NEGATIVE
VALID INTERNAL CONTROL, POC: NORMAL
VALID INTERNAL CONTROL, POC: NORMAL

## 2024-03-15 RX ORDER — AZITHROMYCIN 250 MG/1
TABLET, FILM COATED ORAL
Qty: 6 TABLET | Refills: 0 | Status: SHIPPED | OUTPATIENT
Start: 2024-03-15 | End: 2024-03-25

## 2024-03-15 RX ORDER — FLUTICASONE PROPIONATE 50 MCG
2 SPRAY, SUSPENSION (ML) NASAL DAILY
Qty: 16 G | Refills: 1 | Status: SHIPPED | OUTPATIENT
Start: 2024-03-15

## 2024-03-15 ASSESSMENT — PATIENT HEALTH QUESTIONNAIRE - PHQ9
SUM OF ALL RESPONSES TO PHQ QUESTIONS 1-9: 0
SUM OF ALL RESPONSES TO PHQ QUESTIONS 1-9: 0
6. FEELING BAD ABOUT YOURSELF - OR THAT YOU ARE A FAILURE OR HAVE LET YOURSELF OR YOUR FAMILY DOWN: 0
SUM OF ALL RESPONSES TO PHQ QUESTIONS 1-9: 0
SUM OF ALL RESPONSES TO PHQ QUESTIONS 1-9: 0
10. IF YOU CHECKED OFF ANY PROBLEMS, HOW DIFFICULT HAVE THESE PROBLEMS MADE IT FOR YOU TO DO YOUR WORK, TAKE CARE OF THINGS AT HOME, OR GET ALONG WITH OTHER PEOPLE: 0
3. TROUBLE FALLING OR STAYING ASLEEP: 0
SUM OF ALL RESPONSES TO PHQ9 QUESTIONS 1 & 2: 0
2. FEELING DOWN, DEPRESSED OR HOPELESS: 0
1. LITTLE INTEREST OR PLEASURE IN DOING THINGS: 0
7. TROUBLE CONCENTRATING ON THINGS, SUCH AS READING THE NEWSPAPER OR WATCHING TELEVISION: 0
8. MOVING OR SPEAKING SO SLOWLY THAT OTHER PEOPLE COULD HAVE NOTICED. OR THE OPPOSITE, BEING SO FIGETY OR RESTLESS THAT YOU HAVE BEEN MOVING AROUND A LOT MORE THAN USUAL: 0
4. FEELING TIRED OR HAVING LITTLE ENERGY: 0
9. THOUGHTS THAT YOU WOULD BE BETTER OFF DEAD, OR OF HURTING YOURSELF: 0
5. POOR APPETITE OR OVEREATING: 0

## 2024-03-15 NOTE — PROGRESS NOTES
Vonnie Mackenzie is a 43 y.o. female , id x 2(name and ). Reviewed record, history, and  medications.      Chief Complaint   Patient presents with    URI     Patient c/o sore throat, head congestion, body aches, headache, since yesterday. Child sick at home over the past 2 weeks. No exposure, no covid test.         There were no vitals filed for this visit.    Coordination of Care Questionnaire:   1. Have you been to the ER, urgent care clinic since your last visit?  Hospitalized since your last visit?No    2. Have you seen or consulted any other health care providers outside of the [facility] since your last visit?  Include any pap smears or colon screening. No          3/15/2024    11:23 AM   PHQ-9    Little interest or pleasure in doing things 0   Feeling down, depressed, or hopeless 0   Trouble falling or staying asleep, or sleeping too much 0   Feeling tired or having little energy 0   Poor appetite or overeating 0   Feeling bad about yourself - or that you are a failure or have let yourself or your family down 0   Trouble concentrating on things, such as reading the newspaper or watching television 0   Moving or speaking so slowly that other people could have noticed. Or the opposite - being so fidgety or restless that you have been moving around a lot more than usual 0   Thoughts that you would be better off dead, or of hurting yourself in some way 0   PHQ-2 Score 0   PHQ-9 Total Score 0   If you checked off any problems, how difficult have these problems made it for you to do your work, take care of things at home, or get along with other people? 0            No data to display                Social Determinants of Health     Tobacco Use: High Risk (10/2/2023)    Patient History     Smoking Tobacco Use: Never     Smokeless Tobacco Use: Current     Passive Exposure: Not on file   Alcohol Use: Not At Risk (2024)    AUDIT-C     Frequency of Alcohol Consumption: Never     Average Number of 
external ear canals normal  Nose - mucosal congestion  Mouth - erythematous and tonsils hypertrophied   Neck -mild tenderness to palpation  Chest - clear to auscultation, no wheezes, rales or rhonchi, symmetric air entry  Heart - normal rate and regular rhythm, no murmurs noted    Assessment/ Plan:   Vonnie was seen today for uri.    Diagnoses and all orders for this visit:    Sore throat  -     AMB POC COVID-19 COV  -     AMB POC INFLUENZA A  AND B REAL-TIME RT-PCR  -     AMB POC STREP GO A DIRECT, DNA PROBE  -     azithromycin (ZITHROMAX) 250 MG tablet; 500mg on day 1 followed by 250mg on days 2 - 5  -     Culture, Throat; Future  -     Culture, Throat    Nasal congestion  -     AMB POC COVID-19 COV  -     AMB POC INFLUENZA A  AND B REAL-TIME RT-PCR  -     AMB POC STREP GO A DIRECT, DNA PROBE  -     fluticasone (FLONASE) 50 MCG/ACT nasal spray; 2 sprays by Each Nostril route daily    Patient's COVID-19 influenza AMB and rapid strep were negative.  I advised the patient that I would like to send off for throat culture.  Antibiotics therapy has been sent to the pharmacy and to recontact with results of the throat culture once back.  Patient understands if symptoms persist or worsen she should follow-up.  Time was used for level of billing: no     No follow-up provider specified.    I have discussed the diagnosis with the patient and the intended plan as seen in the above orders.  The patient has received an after-visit summary and questions were answered concerning future plans.     Medication Side Effects and Warnings were discussed with patient: Yes  Patient Labs were reviewed and or requested: Yes  Patient Past Records were reviewed and or requested  Yes    Verónica Prabhakar PA-C

## 2024-03-17 LAB
BACTERIA SPEC CULT: NORMAL
SERVICE CMNT-IMP: NORMAL

## 2024-03-18 ENCOUNTER — TELEPHONE (OUTPATIENT)
Age: 44
End: 2024-03-18

## 2024-03-18 NOTE — TELEPHONE ENCOUNTER
We received a fax refill request for Vonnie Mackenzie.  Please escribe Hydrochlorothiazide to their pharmacy.  The pharmacy is correct in the chart and they are requesting a 30 day supply.     None known

## 2024-05-13 ENCOUNTER — OFFICE VISIT (OUTPATIENT)
Age: 44
End: 2024-05-13
Payer: COMMERCIAL

## 2024-05-13 VITALS
SYSTOLIC BLOOD PRESSURE: 132 MMHG | OXYGEN SATURATION: 100 % | HEIGHT: 65 IN | DIASTOLIC BLOOD PRESSURE: 86 MMHG | HEART RATE: 85 BPM | WEIGHT: 194 LBS | BODY MASS INDEX: 32.32 KG/M2 | RESPIRATION RATE: 18 BRPM

## 2024-05-13 DIAGNOSIS — G43.E01 CHRONIC MIGRAINE WITH AURA AND WITH STATUS MIGRAINOSUS, NOT INTRACTABLE: Primary | ICD-10-CM

## 2024-05-13 PROCEDURE — 3079F DIAST BP 80-89 MM HG: CPT | Performed by: STUDENT IN AN ORGANIZED HEALTH CARE EDUCATION/TRAINING PROGRAM

## 2024-05-13 PROCEDURE — 99214 OFFICE O/P EST MOD 30 MIN: CPT | Performed by: STUDENT IN AN ORGANIZED HEALTH CARE EDUCATION/TRAINING PROGRAM

## 2024-05-13 PROCEDURE — 3075F SYST BP GE 130 - 139MM HG: CPT | Performed by: STUDENT IN AN ORGANIZED HEALTH CARE EDUCATION/TRAINING PROGRAM

## 2024-05-13 RX ORDER — ONDANSETRON 4 MG/1
4 TABLET, ORALLY DISINTEGRATING ORAL 3 TIMES DAILY PRN
Qty: 60 TABLET | Refills: 0 | Status: SHIPPED | OUTPATIENT
Start: 2024-05-13

## 2024-05-13 RX ORDER — PROPRANOLOL HCL 60 MG
60 CAPSULE, EXTENDED RELEASE 24HR ORAL DAILY
Qty: 30 CAPSULE | Refills: 1 | Status: SHIPPED | OUTPATIENT
Start: 2024-05-13

## 2024-05-13 ASSESSMENT — PATIENT HEALTH QUESTIONNAIRE - PHQ9
SUM OF ALL RESPONSES TO PHQ QUESTIONS 1-9: 0
1. LITTLE INTEREST OR PLEASURE IN DOING THINGS: NOT AT ALL
SUM OF ALL RESPONSES TO PHQ QUESTIONS 1-9: 0
SUM OF ALL RESPONSES TO PHQ9 QUESTIONS 1 & 2: 0
SUM OF ALL RESPONSES TO PHQ QUESTIONS 1-9: 0
2. FEELING DOWN, DEPRESSED OR HOPELESS: NOT AT ALL
SUM OF ALL RESPONSES TO PHQ QUESTIONS 1-9: 0

## 2024-05-13 NOTE — PROGRESS NOTES
Chief Complaint   Patient presents with    Migraine    Hypertension       AI form was completed today for all future visits.      \"Have you been to the ER, urgent care clinic since your last visit?  Hospitalized since your last visit?\"    NO    “Have you seen or consulted any other health care providers outside of [facility] since your last visit?”    NO            Click Here for Release of Records Request

## 2024-05-13 NOTE — PROGRESS NOTES
Progress Note    she is a 43 y.o. year old female who presents for evaluation of Migraine and Hypertension        Assessment/ Plan:     1. Chronic migraine with aura and with status migrainosus, not intractable  -     propranolol (INDERAL LA) 60 MG extended release capsule; Take 1 capsule by mouth daily, Disp-30 capsule, R-1Normal  -     ondansetron (ZOFRAN-ODT) 4 MG disintegrating tablet; Take 1 tablet by mouth 3 times daily as needed for Nausea or Vomiting, Disp-60 tablet, R-0Normal    Assessment & Plan  1. Migraine headaches.  A prescription for propranolol has been issued. Additionally, Ubrelvy has been provided for breakthrough headaches. The patient has been advised to perform neck stretches to alleviate tension. Dietary modifications, specifically a low-carbohydrate diet, have been recommended. Should the propranolol prove ineffective, consideration will be given to transitioning her to Qulipta.      Return in about 4 weeks (around 6/10/2024) for follow-up migraines 2-4 weeks.      I have discussed the diagnosis with the patient and the intended plan as seen in the above orders.    The patient has received an after-visit summary and questions were answered concerning future plans.   Pt conveyed understanding of plan.    Medication Side Effects and Warnings were discussed with patient    Current Outpatient Medications   Medication Sig    propranolol (INDERAL LA) 60 MG extended release capsule Take 1 capsule by mouth daily    ondansetron (ZOFRAN-ODT) 4 MG disintegrating tablet Take 1 tablet by mouth 3 times daily as needed for Nausea or Vomiting    Multiple Vitamin (MULTIVITAMIN ADULT PO) Take 1 tablet by mouth daily    fluticasone (FLONASE) 50 MCG/ACT nasal spray 2 sprays by Each Nostril route daily    hydroCHLOROthiazide (HYDRODIURIL) 25 MG tablet Take 1 tablet by mouth daily Final refill without follow-up visit.  Please call the office to schedule an appointment.    buPROPion (WELLBUTRIN XL) 150 MG

## 2024-07-02 ENCOUNTER — TELEPHONE (OUTPATIENT)
Age: 44
End: 2024-07-02

## 2024-07-02 DIAGNOSIS — B00.9 HSV INFECTION: Primary | ICD-10-CM

## 2024-07-02 RX ORDER — VALACYCLOVIR HYDROCHLORIDE 500 MG/1
500 TABLET, FILM COATED ORAL 2 TIMES DAILY
Qty: 10 TABLET | Refills: 1 | Status: SHIPPED | OUTPATIENT
Start: 2024-07-02

## 2024-07-02 NOTE — TELEPHONE ENCOUNTER
Patient reports that she believes she is having a flare of her HSV.  Medication will be sent  to the pharmacy.

## 2024-08-02 ENCOUNTER — TELEPHONE (OUTPATIENT)
Facility: CLINIC | Age: 44
End: 2024-08-02

## 2024-08-02 DIAGNOSIS — J02.0 ACUTE STREPTOCOCCAL PHARYNGITIS: Primary | ICD-10-CM

## 2024-08-02 RX ORDER — CEPHALEXIN 500 MG/1
500 CAPSULE ORAL 2 TIMES DAILY
Qty: 20 CAPSULE | Refills: 0 | Status: SHIPPED | OUTPATIENT
Start: 2024-08-02

## 2024-08-02 NOTE — TELEPHONE ENCOUNTER
Ms. Mackenzie reports sore throat, headache and fatigue x 2 days.  Rapid strep positive.  On exam patient with posterior pharyngeal erythema, no exudate or palatal petechiae.   Will start Keflex 500mg twice daily for 10 days.

## 2024-09-05 NOTE — TELEPHONE ENCOUNTER
----- Message from LAKESHIA Bhat sent at 9/5/2024  9:01 AM CDT -----  Please tell Myron,  Cholesterol optimal, kidneys and liver good, blood counts ok with no anemia.      Medication Management Service    Date: 8/5/2022  Patient's Name: Christa Coates YOB: 1980            _____________________________________________________________________________________________          Left message to schedule initial Specialty Medication PharmD review. Please return call: [phone] option#4.     Madelaine Phipps PharmD [address] [facility]  Ambulatory Clinical Pharmacist  Specialty Medication Services  Phone: [phone]

## 2024-09-06 ENCOUNTER — OFFICE VISIT (OUTPATIENT)
Age: 44
End: 2024-09-06

## 2024-09-06 VITALS — SYSTOLIC BLOOD PRESSURE: 133 MMHG | DIASTOLIC BLOOD PRESSURE: 84 MMHG | WEIGHT: 191 LBS | BODY MASS INDEX: 31.78 KG/M2

## 2024-09-06 DIAGNOSIS — Z12.4 CERVICAL CANCER SCREENING: ICD-10-CM

## 2024-09-06 DIAGNOSIS — N89.8 VAGINAL ODOR: ICD-10-CM

## 2024-09-06 DIAGNOSIS — Z11.51 ENCOUNTER FOR SCREENING FOR HUMAN PAPILLOMAVIRUS (HPV): ICD-10-CM

## 2024-09-06 DIAGNOSIS — Z01.419 ENCOUNTER FOR GYNECOLOGICAL EXAMINATION: Primary | ICD-10-CM

## 2024-09-06 DIAGNOSIS — Z20.2 STD EXPOSURE: ICD-10-CM

## 2024-09-06 DIAGNOSIS — B00.9 HSV INFECTION: ICD-10-CM

## 2024-09-06 RX ORDER — VALACYCLOVIR HYDROCHLORIDE 500 MG/1
500 TABLET, FILM COATED ORAL DAILY
Qty: 90 TABLET | Refills: 3 | Status: SHIPPED | OUTPATIENT
Start: 2024-09-06

## 2024-09-06 RX ORDER — MEDROXYPROGESTERONE ACETATE 150 MG/ML
150 INJECTION, SUSPENSION INTRAMUSCULAR
Qty: 1 ML | Refills: 4 | Status: SHIPPED | OUTPATIENT
Start: 2024-09-06

## 2024-09-06 NOTE — PROGRESS NOTES
Vonnie Mackenzie is a 44 y.o. female returns for an annual exam     Chief Complaint   Patient presents with    Annual Exam       No LMP recorded. (Menstrual status: Chemically Induced).  Her periods are absent  Problems: problems - vaginal odor.  Birth Control: Depo-Provera injections.  Last Pap: see report obtained 3 year(s) ago.  She does not have a history of RICHY 2, 3 or cervical cancer.   Last Mammogram: had her mammogram today in our office.          1. Have you been to the ER, urgent care clinic, or hospitalized since your last visit? No    2. Have you seen or consulted any other health care providers outside of the [facility] since your last visit? No    Examination chaperoned by Edilma Mayfield LPN.  
Medications   Medication Sig Dispense Refill    medroxyPROGESTERone (DEPO-PROVERA) 150 MG/ML injection Inject 1 mL into the muscle every 3 months 1 mL 4    valACYclovir (VALTREX) 500 MG tablet Take 1 tablet by mouth daily 90 tablet 3    propranolol (INDERAL LA) 60 MG extended release capsule Take 1 capsule by mouth daily 30 capsule 1    ondansetron (ZOFRAN-ODT) 4 MG disintegrating tablet Take 1 tablet by mouth 3 times daily as needed for Nausea or Vomiting 60 tablet 0    Multiple Vitamin (MULTIVITAMIN ADULT PO) Take 1 tablet by mouth daily      fluticasone (FLONASE) 50 MCG/ACT nasal spray 2 sprays by Each Nostril route daily 16 g 1    hydroCHLOROthiazide (HYDRODIURIL) 25 MG tablet Take 1 tablet by mouth daily Final refill without follow-up visit.  Please call the office to schedule an appointment. 30 tablet 0    buPROPion (WELLBUTRIN XL) 150 MG extended release tablet TAKE ONE TABLET BY MOUTH EVERY MORNING 90 tablet 1    vitamin D (ERGOCALCIFEROL) 1.25 MG (07098 UT) CAPS capsule TAKE ONE CAPSULE BY MOUTH ONE TIME WEEKLY PLEASE SCHEDULE AN APPOINTMENT TO HAVE LABS RECHECKED AFTER 3 MONTHS ON THIS SUPPLEMENT 12 capsule 1    escitalopram (LEXAPRO) 20 MG tablet TAKE ONE TABLET BY MOUTH EVERY DAY 90 tablet 0    amLODIPine (NORVASC) 10 MG tablet TAKE ONE TABLET BY MOUTH EVERY DAY 90 tablet 0    butalbital-acetaminophen-caffeine (FIORICET, ESGIC) -40 MG per tablet Take 1 tablet by mouth every 6 hours as needed      cephALEXin (KEFLEX) 500 MG capsule Take 1 capsule by mouth in the morning and 1 capsule in the evening. (Patient not taking: Reported on 2024) 20 capsule 0     No current facility-administered medications for this visit.      OB History    Para Term  AB Living   4 1 1   3 1   SAB IAB Ectopic Molar Multiple Live Births     2 1     1      # Outcome Date GA Lbr Papi/2nd Weight Sex Type Anes PTL Lv   4 Term 18 38w6d 13:57 / 00:39 3.08 kg (6 lb 12.6 oz) M Vag-Spont  N ROBERTO CARLOS   3 Ectopic

## 2024-09-09 LAB
A VAGINAE DNA VAG QL NAA+PROBE: NORMAL SCORE
BVAB2 DNA VAG QL NAA+PROBE: NORMAL SCORE
C ALBICANS DNA VAG QL NAA+PROBE: NEGATIVE
C GLABRATA DNA VAG QL NAA+PROBE: NEGATIVE
C TRACH DNA SPEC QL NAA+PROBE: NEGATIVE
MEGA1 DNA VAG QL NAA+PROBE: NORMAL SCORE
N GONORRHOEA DNA VAG QL NAA+PROBE: NEGATIVE
T VAGINALIS DNA VAG QL NAA+PROBE: NEGATIVE

## 2024-09-12 LAB
CYTOLOGIST CVX/VAG CYTO: NORMAL
CYTOLOGY CVX/VAG DOC CYTO: NORMAL
CYTOLOGY CVX/VAG DOC THIN PREP: NORMAL
DX ICD CODE: NORMAL
HPV GENOTYPE REFLEX: NORMAL
HPV I/H RISK 4 DNA CVX QL PROBE+SIG AMP: NEGATIVE
Lab: NORMAL
OTHER STN SPEC: NORMAL
STAT OF ADQ CVX/VAG CYTO-IMP: NORMAL

## 2024-11-01 NOTE — PROGRESS NOTES
Progress Note    she is a 38y.o. year old female who presents for evalution. Assessment/ Plan:   {There are no diagnoses linked to this encounter. (Refresh or delete this SmartLink)}     Patient is *** fasting for labs today. I have discussed the diagnosis with the patient and the intended plan as seen in the above orders. The patient has received an after-visit summary and questions were answered concerning future plans. Pt conveyed understanding of plan. Medication Side Effects and Warnings were discussed with patient        Subjective:     Chief Complaint   Patient presents with    Labs     Is fasting     Fatigue     About 6 months all she wants to do is sleep. \"Feeling like this for awhile now\"  Encouraged to go get checked out  She sleeps all the time:   8:30/9 am wakes up    Feels a little better with 9 am    Dress, off to school/work   4:30 pm notices feeling sleepy, eyes heavy    Goes and picks up [employer]   7/7:30/8 pm will wake up after 1 hour nap    Bedtime routine   10 pm laying down in bed    Scrolling on her phone   11:30 pm/12 am falls asleep    Does sleep through the night    Up once at night for nocturia  Over the last 6 months feels like things are worsening  After her coffee she feels well rested   Currently drinking 1 12 oz cup in the morning  No known snoring, she thinks that she does when she is very tired. Activity makes fatigue feel   In the morning better, hard to get up and go   Hasn't tried activity in the evening, she thinks it might help. She has gained weight, attributes to depo. No aches or pains. Ankle pain, chronic. Past injury, chipped a bone in her left ankle. Headaches are well controlled  Breakthrough headaches but then leveled out. No medication changes in the last year.   No current specialists   Previously seeing cardiology for rapid heart rate/palpitations which are still an issue    She overexerts herself easily    Pulse 125 after walking recently    Not active right now      Reviewed PmHx, RxHx, FmHx, SocHx, AllgHx and updated and dated in the chart. Review of Systems - negative except as listed above in the HPI    Objective:     Vitals:    03/24/23 1106   BP: 104/73   Pulse: 82   Temp: (!) 82 °F (27.8 °C)   SpO2: 98%   Weight: 173 lb 4.8 oz (78.6 kg)   Height: 5' 5\" (1.651 m)       Current Outpatient Medications   Medication Sig    hydroCHLOROthiazide (HYDRODIURIL) 25 mg tablet TAKE 1 TABLET BY MOUTH DAILY    amLODIPine (NORVASC) 10 mg tablet TAKE 1 TABLET BY MOUTH ONE TIME A DAY    escitalopram oxalate (LEXAPRO) 20 mg tablet TAKE 1 TABLET BY MOUTH ONE TIME A DAY    valACYclovir (VALTREX) 500 mg tablet TAKE ONE TABLET BY MOUTH TWO TIMES A DAY    Aimovig Autoinjector 70 mg/mL injection Inject 70 mg (1 pen) under the skin once monthly    buPROPion XL (WELLBUTRIN XL) 150 mg tablet TAKE ONE TABLET BY MOUTH EVERY MORNING    multivitamin (ONE A DAY) tablet Take 1 Tablet by mouth daily. butalbital-acetaminophen-caffeine (FIORICET, ESGIC) -40 mg per tablet Take 1 Tablet by mouth every six (6) hours as needed for Migraine. clonazePAM (KlonoPIN) 0.5 mg tablet Take 1 Tab by mouth nightly as needed for Anxiety. Max Daily Amount: 0.5 mg. No current facility-administered medications for this visit.        Physical Exam         Lew Johnson MD herself easily    Pulse 125 after walking recently    Not active right now      Reviewed PmHx, RxHx, FmHx, SocHx, AllgHx and updated and dated in the chart. Review of Systems - negative except as listed above in the HPI    Objective:     Vitals:    03/24/23 1106   BP: 104/73   Pulse: 82   Temp: (!) 82 °F (27.8 °C)   SpO2: 98%   Weight: 173 lb 4.8 oz (78.6 kg)   Height: 5' 5\" (1.651 m)       Current Outpatient Medications   Medication Sig    hydroCHLOROthiazide (HYDRODIURIL) 25 mg tablet TAKE 1 TABLET BY MOUTH DAILY    amLODIPine (NORVASC) 10 mg tablet TAKE 1 TABLET BY MOUTH ONE TIME A DAY    escitalopram oxalate (LEXAPRO) 20 mg tablet TAKE 1 TABLET BY MOUTH ONE TIME A DAY    valACYclovir (VALTREX) 500 mg tablet TAKE ONE TABLET BY MOUTH TWO TIMES A DAY    Aimovig Autoinjector 70 mg/mL injection Inject 70 mg (1 pen) under the skin once monthly    buPROPion XL (WELLBUTRIN XL) 150 mg tablet TAKE ONE TABLET BY MOUTH EVERY MORNING    multivitamin (ONE A DAY) tablet Take 1 Tablet by mouth daily. butalbital-acetaminophen-caffeine (FIORICET, ESGIC) -40 mg per tablet Take 1 Tablet by mouth every six (6) hours as needed for Migraine. clonazePAM (KlonoPIN) 0.5 mg tablet Take 1 Tab by mouth nightly as needed for Anxiety. Max Daily Amount: 0.5 mg. No current facility-administered medications for this visit. Physical Exam  Vitals and nursing note reviewed. Constitutional:       General: She is not in acute distress. Appearance: Normal appearance. She is not ill-appearing, toxic-appearing or diaphoretic. HENT:      Head: Normocephalic and atraumatic. Right Ear: External ear normal.      Left Ear: External ear normal.      Nose: Nose normal.      Mouth/Throat:      Mouth: Mucous membranes are moist.   Eyes:      General: No scleral icterus. Right eye: No discharge. Left eye: No discharge.       Conjunctiva/sclera: Conjunctivae normal.   Cardiovascular:      Rate and Rhythm: Normal How Severe Is This Condition?: mild rate and regular rhythm. Pulses: Normal pulses. Heart sounds: Normal heart sounds. Pulmonary:      Effort: Pulmonary effort is normal. No respiratory distress. Breath sounds: Normal breath sounds. Abdominal:      General: Abdomen is flat. Bowel sounds are normal. There is no distension. Palpations: Abdomen is soft. There is no mass. Tenderness: There is no abdominal tenderness. There is no guarding or rebound. Musculoskeletal:         General: No tenderness or deformity. Cervical back: No rigidity. Right lower leg: No edema. Left lower leg: No edema. Lymphadenopathy:      Cervical: No cervical adenopathy. Skin:     General: Skin is warm and dry. Neurological:      General: No focal deficit present. Mental Status: She is alert and oriented to person, place, and time. Psychiatric:         Mood and Affect: Mood normal.         Behavior: Behavior normal.         Thought Content:  Thought content normal.         Judgment: Judgment normal.            Norberto Franks MD

## 2024-11-22 ENCOUNTER — CLINICAL DOCUMENTATION (OUTPATIENT)
Facility: CLINIC | Age: 44
End: 2024-11-22

## 2024-11-26 ENCOUNTER — OFFICE VISIT (OUTPATIENT)
Facility: CLINIC | Age: 44
End: 2024-11-26

## 2024-11-26 VITALS
BODY MASS INDEX: 31.82 KG/M2 | HEART RATE: 87 BPM | OXYGEN SATURATION: 100 % | RESPIRATION RATE: 20 BRPM | WEIGHT: 191 LBS | TEMPERATURE: 99.1 F | HEIGHT: 65 IN | SYSTOLIC BLOOD PRESSURE: 136 MMHG | DIASTOLIC BLOOD PRESSURE: 86 MMHG

## 2024-11-26 DIAGNOSIS — F32.A DEPRESSION, UNSPECIFIED DEPRESSION TYPE: Primary | ICD-10-CM

## 2024-11-26 DIAGNOSIS — F32.1 MAJOR DEPRESSIVE DISORDER, SINGLE EPISODE, MODERATE (HCC): ICD-10-CM

## 2024-11-26 DIAGNOSIS — G47.10 HYPERSOMNIA: ICD-10-CM

## 2024-11-26 DIAGNOSIS — Z23 FLU VACCINE NEED: ICD-10-CM

## 2024-11-26 RX ORDER — BUTALBITAL, ACETAMINOPHEN AND CAFFEINE 50; 325; 40 MG/1; MG/1; MG/1
1 TABLET ORAL EVERY 6 HOURS PRN
Qty: 180 TABLET | Refills: 0 | Status: SHIPPED | OUTPATIENT
Start: 2024-11-26

## 2024-11-26 SDOH — ECONOMIC STABILITY: FOOD INSECURITY: WITHIN THE PAST 12 MONTHS, THE FOOD YOU BOUGHT JUST DIDN'T LAST AND YOU DIDN'T HAVE MONEY TO GET MORE.: NEVER TRUE

## 2024-11-26 SDOH — ECONOMIC STABILITY: INCOME INSECURITY: HOW HARD IS IT FOR YOU TO PAY FOR THE VERY BASICS LIKE FOOD, HOUSING, MEDICAL CARE, AND HEATING?: NOT HARD AT ALL

## 2024-11-26 SDOH — ECONOMIC STABILITY: FOOD INSECURITY: WITHIN THE PAST 12 MONTHS, YOU WORRIED THAT YOUR FOOD WOULD RUN OUT BEFORE YOU GOT MONEY TO BUY MORE.: NEVER TRUE

## 2024-11-26 ASSESSMENT — PATIENT HEALTH QUESTIONNAIRE - PHQ9
9. THOUGHTS THAT YOU WOULD BE BETTER OFF DEAD, OR OF HURTING YOURSELF: NOT AT ALL
SUM OF ALL RESPONSES TO PHQ QUESTIONS 1-9: 0
SUM OF ALL RESPONSES TO PHQ QUESTIONS 1-9: 0
4. FEELING TIRED OR HAVING LITTLE ENERGY: NOT AT ALL
5. POOR APPETITE OR OVEREATING: NOT AT ALL
SUM OF ALL RESPONSES TO PHQ QUESTIONS 1-9: 0
7. TROUBLE CONCENTRATING ON THINGS, SUCH AS READING THE NEWSPAPER OR WATCHING TELEVISION: NOT AT ALL
1. LITTLE INTEREST OR PLEASURE IN DOING THINGS: NOT AT ALL
6. FEELING BAD ABOUT YOURSELF - OR THAT YOU ARE A FAILURE OR HAVE LET YOURSELF OR YOUR FAMILY DOWN: NOT AT ALL
2. FEELING DOWN, DEPRESSED OR HOPELESS: NOT AT ALL
8. MOVING OR SPEAKING SO SLOWLY THAT OTHER PEOPLE COULD HAVE NOTICED. OR THE OPPOSITE, BEING SO FIGETY OR RESTLESS THAT YOU HAVE BEEN MOVING AROUND A LOT MORE THAN USUAL: NOT AT ALL
SUM OF ALL RESPONSES TO PHQ QUESTIONS 1-9: 0
10. IF YOU CHECKED OFF ANY PROBLEMS, HOW DIFFICULT HAVE THESE PROBLEMS MADE IT FOR YOU TO DO YOUR WORK, TAKE CARE OF THINGS AT HOME, OR GET ALONG WITH OTHER PEOPLE: NOT DIFFICULT AT ALL
SUM OF ALL RESPONSES TO PHQ9 QUESTIONS 1 & 2: 0
3. TROUBLE FALLING OR STAYING ASLEEP: NOT AT ALL

## 2024-11-26 NOTE — PROGRESS NOTES
Vonnie Mackenzie (:  1980) is a 44 y.o. female, here for evaluation of the following chief complaint(s):  No chief complaint on file.         Assessment & Plan      Results    1. Depression, unspecified depression type  -     [facility] - Fabio, Sarah, Vidya, Neuropsychology, [place]  2. Hypersomnia  -     [facility] - Fabio, Sarah, PsyD, Neuropsychology, [place]  3. Flu vaccine need  -     Influenza, FLUCELVAX Trivalent, (age  6 mo+), IM, Multi-Dose Vial, 0.5mL  4. Major depressive disorder, single episode, moderate (HCC)    No follow-ups on file.       Subjective   History of Present Illness  Patient presents today for her flu shot, medication refill, and referral.   The patient reports that her annual flu shot is needed.  Patient would also like to get a referral to see Dr. Mccarthy in reference to having some testing done.  Patient shares with me that she has had some episodes of depression recently and has moments that she feels that she needs to sleep right for long peers of time..  The patient reports around 3:00 each day is normally around the time that she starts to feel fatigued and sleepy.  Also the patient has had a migraine for 10 days.  The patient would like to get a refill of her Fioricet.    Review of Systems       Objective   There were no vitals taken for this visit.  Physical Exam  Patient is in no acute distress  Patient is not suicidal or homicidal.       No chief complaint on file.    She is a 44 y.o. female who presents for evalution.     Reviewed PmHx, RxHx, FmHx, SocHx, AllgHx and updated and dated in the chart.    CURRENT MEDS W/ ASSOC DIAG           Start Date End Date     amLODIPine (NORVASC) 10 MG tablet  23  --     TAKE ONE TABLET BY MOUTH EVERY DAY     Associated Diagnoses:  --     buPROPion (WELLBUTRIN XL) 150 MG extended release tablet  10/31/23  --     TAKE ONE TABLET BY MOUTH EVERY MORNING     Associated Diagnoses:  --     butalbital-acetaminophen-caffeine 
Vonnie Mackenzie is a 44 y.o. female , id x 2(name and ). Reviewed record, history, and  medications.      Chief Complaint   Patient presents with    Migraine     Patient here today for migraine x1 week, referral and flu shot.         Vitals:    24 1045   BP: 136/86   Site: Left Upper Arm   Position: Sitting   Cuff Size: Large Adult   Pulse: 87   Resp: 20   Temp: 99.1 °F (37.3 °C)   TempSrc: Oral   SpO2: 100%   Weight: 86.6 kg (191 lb)   Height: 1.651 m (5' 5\")             2024    10:47 AM   PHQ-9    Little interest or pleasure in doing things 0   Feeling down, depressed, or hopeless 0   Trouble falling or staying asleep, or sleeping too much 0   Feeling tired or having little energy 0   Poor appetite or overeating 0   Feeling bad about yourself - or that you are a failure or have let yourself or your family down 0   Trouble concentrating on things, such as reading the newspaper or watching television 0   Moving or speaking so slowly that other people could have noticed. Or the opposite - being so fidgety or restless that you have been moving around a lot more than usual 0   Thoughts that you would be better off dead, or of hurting yourself in some way 0   PHQ-2 Score 0   PHQ-9 Total Score 0   If you checked off any problems, how difficult have these problems made it for you to do your work, take care of things at home, or get along with other people? 0            No data to display                Social Determinants of Health     Tobacco Use: High Risk (2024)    Patient History     Smoking Tobacco Use: Never     Smokeless Tobacco Use: Current     Passive Exposure: Not on file   Alcohol Use: Not At Risk (2024)    AUDIT-C     Frequency of Alcohol Consumption: Never     Average Number of Drinks: Patient does not drink     Frequency of Binge Drinking: Never   Financial Resource Strain: Low Risk  (2024)    Overall Financial Resource Strain (CARDIA)     Difficulty of Paying Living 
hydroCHLOROthiazide (HYDRODIURIL) 25 MG tablet  24  --     Take 1 tablet by mouth daily Final refill without follow-up visit.  Please call the office to schedule an appointment.     Associated Diagnoses:  Primary hypertension     medroxyPROGESTERone (DEPO-PROVERA) 150 MG/ML injection  24  --     Inject 1 mL into the muscle every 3 months     Associated Diagnoses:  --     Multiple Vitamin (MULTIVITAMIN ADULT PO)  --  --     Associated Diagnoses:  --     ondansetron (ZOFRAN-ODT) 4 MG disintegrating tablet  24  --     Take 1 tablet by mouth 3 times daily as needed for Nausea or Vomiting     Associated Diagnoses:  Chronic migraine with aura and with status migrainosus, not intractable     propranolol (INDERAL LA) 60 MG extended release capsule  24  --     Take 1 capsule by mouth daily     Associated Diagnoses:  Chronic migraine with aura and with status migrainosus, not intractable     valACYclovir (VALTREX) 500 MG tablet  24  --     Take 1 tablet by mouth daily     Associated Diagnoses:  HSV infection     vitamin D (ERGOCALCIFEROL) 1.25 MG (66697 UT) CAPS capsule  23  --     TAKE ONE CAPSULE BY MOUTH ONE TIME WEEKLY PLEASE SCHEDULE AN APPOINTMENT TO HAVE LABS RECHECKED AFTER 3 MONTHS ON THIS SUPPLEMENT     Associated Diagnoses:  --             Patient Active Problem List   Diagnosis Code    Migraine G43.909    Endometriosis N80.9    Family history of cystic fibrosis Z83.49    Hypertension I10    Tachycardia R00.0    Current moderate episode of major depressive disorder without prior episode (HCC) F32.1    Fatigue R53.83    Overweight (BMI 25.0-29.9) E66.3    Hypovitaminosis D E55.9    Pure hypercholesterolemia E78.00    Chronic right-sided low back pain with right-sided sciatica M54.41, G89.29        Review of Systems - negative except as listed above in the HPI    {Time was used for billin}     The patient (or guardian, if applicable) and other individuals in attendance with

## 2024-12-02 ENCOUNTER — HOSPITAL ENCOUNTER (EMERGENCY)
Facility: HOSPITAL | Age: 44
Discharge: HOME | End: 2024-12-02
Attending: EMERGENCY MEDICINE
Payer: COMMERCIAL

## 2024-12-02 VITALS
OXYGEN SATURATION: 99 % | DIASTOLIC BLOOD PRESSURE: 82 MMHG | HEART RATE: 92 BPM | RESPIRATION RATE: 16 BRPM | WEIGHT: 194 LBS | SYSTOLIC BLOOD PRESSURE: 141 MMHG | HEIGHT: 65 IN | TEMPERATURE: 98.2 F | BODY MASS INDEX: 32.32 KG/M2

## 2024-12-02 DIAGNOSIS — R51.9 NONINTRACTABLE EPISODIC HEADACHE, UNSPECIFIED HEADACHE TYPE: Primary | ICD-10-CM

## 2024-12-02 PROCEDURE — 6370000000 HC RX 637 (ALT 250 FOR IP): Performed by: EMERGENCY MEDICINE

## 2024-12-02 PROCEDURE — 96372 THER/PROPH/DIAG INJ SC/IM: CPT

## 2024-12-02 PROCEDURE — 6360000002 HC RX W HCPCS

## 2024-12-02 PROCEDURE — 99284 EMERGENCY DEPT VISIT MOD MDM: CPT

## 2024-12-02 PROCEDURE — 6360000002 HC RX W HCPCS: Performed by: EMERGENCY MEDICINE

## 2024-12-02 RX ORDER — METOCLOPRAMIDE 10 MG/1
10 TABLET ORAL ONCE
Status: COMPLETED | OUTPATIENT
Start: 2024-12-02 | End: 2024-12-02

## 2024-12-02 RX ORDER — KETOROLAC TROMETHAMINE 30 MG/ML
30 INJECTION, SOLUTION INTRAMUSCULAR; INTRAVENOUS
Status: DISCONTINUED | OUTPATIENT
Start: 2024-12-02 | End: 2024-12-02

## 2024-12-02 RX ORDER — KETOROLAC TROMETHAMINE 30 MG/ML
30 INJECTION, SOLUTION INTRAMUSCULAR; INTRAVENOUS
Status: COMPLETED | OUTPATIENT
Start: 2024-12-02 | End: 2024-12-02

## 2024-12-02 RX ORDER — DIPHENHYDRAMINE HCL 25 MG
25 CAPSULE ORAL
Status: COMPLETED | OUTPATIENT
Start: 2024-12-02 | End: 2024-12-02

## 2024-12-02 RX ORDER — ONDANSETRON 4 MG/1
4 TABLET, ORALLY DISINTEGRATING ORAL
Status: COMPLETED | OUTPATIENT
Start: 2024-12-02 | End: 2024-12-02

## 2024-12-02 RX ADMIN — KETOROLAC TROMETHAMINE 30 MG: 30 INJECTION, SOLUTION INTRAMUSCULAR at 12:05

## 2024-12-02 RX ADMIN — DIPHENHYDRAMINE HYDROCHLORIDE 25 MG: 25 CAPSULE ORAL at 12:06

## 2024-12-02 RX ADMIN — METOCLOPRAMIDE 10 MG: 10 TABLET ORAL at 12:06

## 2024-12-02 RX ADMIN — ONDANSETRON 4 MG: 4 TABLET, ORALLY DISINTEGRATING ORAL at 13:13

## 2024-12-02 RX ADMIN — DEXAMETHASONE 10 MG: 6 TABLET ORAL at 12:06

## 2024-12-02 ASSESSMENT — PAIN DESCRIPTION - LOCATION
LOCATION: HEAD
LOCATION: HEAD

## 2024-12-02 ASSESSMENT — PAIN SCALES - GENERAL
PAINLEVEL_OUTOF10: 8
PAINLEVEL_OUTOF10: 0
PAINLEVEL_OUTOF10: 3

## 2024-12-02 ASSESSMENT — PAIN - FUNCTIONAL ASSESSMENT: PAIN_FUNCTIONAL_ASSESSMENT: 0-10

## 2024-12-02 NOTE — ED NOTES
Pt re-evaluated, endorses HA has \"leveled out\" and rates pain 3/10.  Pt is also reporting increased nausea at this time.

## 2024-12-02 NOTE — ED TRIAGE NOTES
Pt ambulatory to ED w/ c/o migraine that started last week. Pt denies medications PTA or vision changes. Pt has hx of migraines and has taken fioricet previously w/o relief. Pt endorses sensitivity to light, neck pain and nausea.     Pt endorses 8 of 10 pain.
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
## 2024-12-03 ENCOUNTER — CARE COORDINATION (OUTPATIENT)
Dept: OTHER | Facility: CLINIC | Age: 44
End: 2024-12-03

## 2024-12-03 NOTE — CARE COORDINATION
Ambulatory Care Coordination Note     12/3/2024 4:13 PM     Patient outreach attempt by this [facility] today to offer care management services. [facility] was unable to reach the patient by telephone today;   left voice message requesting a return phone call to this [facility].     ACM: Mercy Greer LPN     Care Summary Note: Patient was seen in the ER on 12/2/24    Patient comes in for headache. She states that she has had a migraine type headache for the last week. Despite taking her normal abortive and preventative medications, patient continues to have symptoms. She has light and noise sensitivity as well as nausea. She denies change in vision hearing or other complaint    Call 911 anytime you think you may need emergency care. For example, call if:   You have signs of a stroke. These may include:  Sudden numbness, paralysis, or weakness in your face, arm, or leg, especially on only  one side of your body.  Sudden vision changes.  Sudden trouble speaking.  Sudden confusion or trouble understanding simple statements.  Sudden problems with walking or balance.  A sudden, severe headache that is different from past headaches.    Call your doctor now or seek immediate medical care if:  You have a fever and a stiff neck.  You have new nausea and vomiting, or you cannot keep down food or fluids.  Your headache gets much worse.  Your headaches get worse, happen more often, or change in some way.  You have new symptoms.  Your life is disrupted by your headaches. For example, you often miss work, school, or other  activities.    PCP/Specialist follow up:   Future Appointments         Provider Specialty Dept Phone    9/12/2025 10:40 AM ILEANA HOWARD Obstetrics and Gynecology [phone]    9/12/2025 11:00 AM Rafaela Varner MD Obstetrics and Gynecology [phone]            Follow Up:   Plan for next [facility] outreach in approximately 1-2 days  to complete:  - outreach attempt to offer care management services.

## 2024-12-04 ENCOUNTER — CARE COORDINATION (OUTPATIENT)
Dept: OTHER | Facility: CLINIC | Age: 44
End: 2024-12-04

## 2024-12-19 ENCOUNTER — CARE COORDINATION (OUTPATIENT)
Dept: OTHER | Facility: CLINIC | Age: 44
End: 2024-12-19

## 2024-12-30 NOTE — TELEPHONE ENCOUNTER
Called pt to make her an apt, lvm to call us back.    Review of patient's allergies indicates:   Allergen Reactions    Cranberry Anaphylaxis    Codeine Itching     Other reaction(s): Itching  Other reaction(s): Itching    Sulfamethoxazole-trimethoprim      Other reaction(s): Urticaria  Other reaction(s): Urticaria     Creatinine   Date Value Ref Range Status   11/28/2023 0.8 0.5 - 1.4 mg/dL Final     Sodium   Date Value Ref Range Status   11/28/2023 143 136 - 145 mmol/L Final     Potassium   Date Value Ref Range Status   11/28/2023 3.9 3.5 - 5.1 mmol/L Final

## 2025-01-31 ENCOUNTER — OFFICE VISIT (OUTPATIENT)
Age: 45
End: 2025-01-31

## 2025-01-31 DIAGNOSIS — Z91.199 NO-SHOW FOR APPOINTMENT: Primary | ICD-10-CM

## 2025-02-10 ENCOUNTER — TELEPHONE (OUTPATIENT)
Age: 45
End: 2025-02-10

## 2025-02-12 ENCOUNTER — TELEMEDICINE (OUTPATIENT)
Age: 45
End: 2025-02-12
Payer: COMMERCIAL

## 2025-02-12 DIAGNOSIS — G31.84 MILD COGNITIVE IMPAIRMENT: Primary | ICD-10-CM

## 2025-02-12 DIAGNOSIS — Z76.89 SLEEP CONCERN: ICD-10-CM

## 2025-02-12 DIAGNOSIS — F32.A ANXIETY AND DEPRESSION: ICD-10-CM

## 2025-02-12 DIAGNOSIS — F41.9 ANXIETY AND DEPRESSION: ICD-10-CM

## 2025-02-12 PROCEDURE — 90791 PSYCH DIAGNOSTIC EVALUATION: CPT | Performed by: CLINICAL NEUROPSYCHOLOGIST

## 2025-02-12 NOTE — PROGRESS NOTES
Vonnie Mackenzie, was evaluated through a synchronous (real-time) audio-video encounter. The patient (or guardian if applicable) is aware that this is a billable service, which includes applicable co-pays. This Virtual Visit was conducted with patient's (and/or legal guardian's) consent. Patient identification was verified, and a caregiver was present when appropriate.   The patient was located at Home: [address] [place] [id number]  Provider was located at Facility (Appt Dept): [address] [place] [address]  [address] [place] [id number]  Confirm you are appropriately licensed, registered, or certified to deliver care in the state where the patient is located as indicated above. If you are not or unsure, please re-schedule the visit: Yes, I confirm.     Vonnie Mackenzie (:  1980) is a New patient, presenting virtually for evaluation of the following:        [facility]  NEUROLOGY CLINIC   [address] [place] [address] [phone] Office   [phone] Fax      Neuropsychology    Initial Diagnostic Interview Note      Referral:  Skye Sexton MD    Vonnie Mackenzie is a 44 y.o. right handed single  female who was unaccompanied to the initial clinical interview on 2025 .  Please refer to her medical records for details pertaining to her history.   At the start of the appointment, I reviewed the patient's [facility] Epic Chart (including Media scanned in from previous providers) for the active Problem List, all pertinent Past Medical Hx, medications, recent radiologic and laboratory findings.  In addition, I reviewed pt's documented Immunization Record and Encounter History.     Chief Complaint: New patient, establish care, for neurocognitive and psychologic concerns, as outlined above.     The  has a past medical history of Abnormal mammogram, Arrhythmia, Dysmenorrhea,

## 2025-02-17 ENCOUNTER — PROCEDURE VISIT (OUTPATIENT)
Age: 45
End: 2025-02-17
Payer: COMMERCIAL

## 2025-02-17 DIAGNOSIS — F32.A MILD DEPRESSION: ICD-10-CM

## 2025-02-17 DIAGNOSIS — F90.0 ADHD (ATTENTION DEFICIT HYPERACTIVITY DISORDER), INATTENTIVE TYPE: Chronic | ICD-10-CM

## 2025-02-17 DIAGNOSIS — G31.84 MILD COGNITIVE IMPAIRMENT: Primary | ICD-10-CM

## 2025-02-17 DIAGNOSIS — R45.89 ANXIETY ABOUT HEALTH: ICD-10-CM

## 2025-02-17 PROCEDURE — 96139 PSYCL/NRPSYC TST TECH EA: CPT | Performed by: CLINICAL NEUROPSYCHOLOGIST

## 2025-02-17 PROCEDURE — 96132 NRPSYC TST EVAL PHYS/QHP 1ST: CPT | Performed by: CLINICAL NEUROPSYCHOLOGIST

## 2025-02-17 PROCEDURE — 96133 NRPSYC TST EVAL PHYS/QHP EA: CPT | Performed by: CLINICAL NEUROPSYCHOLOGIST

## 2025-02-17 PROCEDURE — 96138 PSYCL/NRPSYC TECH 1ST: CPT | Performed by: CLINICAL NEUROPSYCHOLOGIST
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
## 2025-02-25 ENCOUNTER — TELEMEDICINE (OUTPATIENT)
Age: 45
End: 2025-02-25

## 2025-02-25 DIAGNOSIS — Z91.199 NO-SHOW FOR APPOINTMENT: Primary | ICD-10-CM

## 2025-02-27 ENCOUNTER — TELEPHONE (OUTPATIENT)
Age: 45
End: 2025-02-27

## 2025-03-04 NOTE — TELEPHONE ENCOUNTER
Patient is calling about r/s an appt. I could not find any appts that were cancelled or missed and needed r/s. I spoke to someone in the office and was told she completed all her appts and didn't need any appts. I relayed this msg to the patient. She stated that she checked in for the VV appt to get her results, but then got pulled away and missed the actual appt. She said she never got the results. Please advise

## 2025-03-21 ENCOUNTER — OFFICE VISIT (OUTPATIENT)
Age: 45
End: 2025-03-21

## 2025-03-21 VITALS
OXYGEN SATURATION: 100 % | SYSTOLIC BLOOD PRESSURE: 130 MMHG | HEIGHT: 65 IN | BODY MASS INDEX: 32.49 KG/M2 | HEART RATE: 92 BPM | WEIGHT: 195 LBS | DIASTOLIC BLOOD PRESSURE: 83 MMHG

## 2025-03-21 DIAGNOSIS — N89.8 VAGINAL DISCHARGE: Primary | ICD-10-CM

## 2025-03-21 DIAGNOSIS — R53.83 OTHER FATIGUE: ICD-10-CM

## 2025-03-21 DIAGNOSIS — D21.9 FIBROID: ICD-10-CM

## 2025-03-21 LAB
ERYTHROCYTE [DISTWIDTH] IN BLOOD BY AUTOMATED COUNT: 13.1 % (ref 11.7–15.4)
HCT VFR BLD AUTO: 40.1 % (ref 34–46.6)
HGB BLD-MCNC: 13.1 G/DL (ref 11.1–15.9)
MCH RBC QN AUTO: 29.4 PG (ref 26.6–33)
MCHC RBC AUTO-ENTMCNC: 32.7 G/DL (ref 31.5–35.7)
MCV RBC AUTO: 90 FL (ref 79–97)
PLATELET # BLD AUTO: 385 X10E3/UL (ref 150–450)
RBC # BLD AUTO: 4.46 X10E6/UL (ref 3.77–5.28)
WBC # BLD AUTO: 8 X10E3/UL (ref 3.4–10.8)

## 2025-03-21 RX ORDER — TERCONAZOLE 4 MG/G
CREAM VAGINAL
Qty: 45 G | Refills: 0 | Status: SHIPPED | OUTPATIENT
Start: 2025-03-21

## 2025-03-21 NOTE — PATIENT INSTRUCTIONS
perfumes in your vagina or on your vulva. These items can change the normal balance of organisms in your vagina.  When should you call for help?   Call your doctor now or seek immediate medical care if:    You have new or increased pain in your vagina or pelvis.   Watch closely for changes in your health, and be sure to contact your doctor if:    You have unexpected vaginal bleeding.     You have a fever.     You are not getting better after 2 days.     Your symptoms come back after you finish your medicines.   Where can you learn more?  Go to https://www.GitHub.net/patientEd and enter F639 to learn more about \"Vaginal Yeast Infection: Care Instructions.\"  Current as of: April 30, 2024  Content Version: 14.4  © 2024-2025 [employer].   Care instructions adapted under license by [employer]. If you have questions about a medical condition or this instruction, always ask your healthcare professional. [employer], disclaims any warranty or liability for your use of this information.

## 2025-04-08 ENCOUNTER — TELEMEDICINE (OUTPATIENT)
Age: 45
End: 2025-04-08
Payer: COMMERCIAL

## 2025-04-08 DIAGNOSIS — F32.A MILD DEPRESSION: ICD-10-CM

## 2025-04-08 DIAGNOSIS — F90.0 ADHD (ATTENTION DEFICIT HYPERACTIVITY DISORDER), INATTENTIVE TYPE: Chronic | ICD-10-CM

## 2025-04-08 DIAGNOSIS — G31.84 MILD COGNITIVE IMPAIRMENT: Primary | ICD-10-CM

## 2025-04-08 PROCEDURE — 96132 NRPSYC TST EVAL PHYS/QHP 1ST: CPT | Performed by: CLINICAL NEUROPSYCHOLOGIST

## 2025-04-08 NOTE — PROGRESS NOTES
Vonnie Mackenzie, was evaluated through a synchronous (real-time) audio-video encounter. The patient (or guardian if applicable) is aware that this is a billable service, which includes applicable co-pays. This Virtual Visit was conducted with patient's (and/or legal guardian's) consent. Patient identification was verified, and a caregiver was present when appropriate.   The patient was located at Home: [address] [place] [phone]  Provider was located at Facility (Appt Dept): [address] [place] [address]  [address]  [place] [address]  Confirm you are appropriately licensed, registered, or certified to deliver care in the state where the patient is located as indicated above. If you are not or unsure, please re-schedule the visit: Yes, I confirm.     Vonnie Mackenzie (:  1980) is a Established patient, presenting virtually for evaluation of the following:      Chief Complaint:  Follow up to discuss test results with this established patient related to neurocognitive and psychologic functioning, cognitive concerns and emotional/mood concerns as outlined below.     A neuropsychological evaluation was completed with the patient on 2025     Prior to seeing the patient for today's visit, I reviewed pertinent records, including the previously completed report, the records in Epic, and any updated visits from other providers since the patient's last visit.     During today's appointment I administered the following measures: NMSE, Fluency.  Exam normal          I provided feedback services related to the previously completed report. Attendees included: Patient. Education was provided regarding my diagnostic impressions, and we discussed treatment plan/options. Attendees were provided with the opportunity to ask questions, which were answered to the best of my ability.     We discussed, in detail, the following:      This patient generated a predominantly normal range Neuropsychological

## 2025-04-11 ENCOUNTER — TELEMEDICINE (OUTPATIENT)
Facility: CLINIC | Age: 45
End: 2025-04-11
Payer: COMMERCIAL

## 2025-04-11 DIAGNOSIS — F32.1 MAJOR DEPRESSIVE DISORDER, SINGLE EPISODE, MODERATE (HCC): ICD-10-CM

## 2025-04-11 DIAGNOSIS — F90.0 ATTENTION DEFICIT HYPERACTIVITY DISORDER (ADHD), PREDOMINANTLY INATTENTIVE TYPE: Primary | ICD-10-CM

## 2025-04-11 PROCEDURE — 99214 OFFICE O/P EST MOD 30 MIN: CPT | Performed by: STUDENT IN AN ORGANIZED HEALTH CARE EDUCATION/TRAINING PROGRAM

## 2025-04-11 PROCEDURE — G8427 DOCREV CUR MEDS BY ELIG CLIN: HCPCS | Performed by: STUDENT IN AN ORGANIZED HEALTH CARE EDUCATION/TRAINING PROGRAM

## 2025-04-11 RX ORDER — DEXTROAMPHETAMINE SACCHARATE, AMPHETAMINE ASPARTATE MONOHYDRATE, DEXTROAMPHETAMINE SULFATE AND AMPHETAMINE SULFATE 2.5; 2.5; 2.5; 2.5 MG/1; MG/1; MG/1; MG/1
10 CAPSULE, EXTENDED RELEASE ORAL DAILY
Qty: 30 CAPSULE | Refills: 0 | Status: SHIPPED | OUTPATIENT
Start: 2025-04-11 | End: 2025-05-11

## 2025-04-11 SDOH — ECONOMIC STABILITY: FOOD INSECURITY: WITHIN THE PAST 12 MONTHS, THE FOOD YOU BOUGHT JUST DIDN'T LAST AND YOU DIDN'T HAVE MONEY TO GET MORE.: NEVER TRUE

## 2025-04-11 SDOH — ECONOMIC STABILITY: FOOD INSECURITY: WITHIN THE PAST 12 MONTHS, YOU WORRIED THAT YOUR FOOD WOULD RUN OUT BEFORE YOU GOT MONEY TO BUY MORE.: NEVER TRUE

## 2025-04-11 ASSESSMENT — PATIENT HEALTH QUESTIONNAIRE - PHQ9
6. FEELING BAD ABOUT YOURSELF - OR THAT YOU ARE A FAILURE OR HAVE LET YOURSELF OR YOUR FAMILY DOWN: NOT AT ALL
1. LITTLE INTEREST OR PLEASURE IN DOING THINGS: NOT AT ALL
2. FEELING DOWN, DEPRESSED OR HOPELESS: NOT AT ALL
10. IF YOU CHECKED OFF ANY PROBLEMS, HOW DIFFICULT HAVE THESE PROBLEMS MADE IT FOR YOU TO DO YOUR WORK, TAKE CARE OF THINGS AT HOME, OR GET ALONG WITH OTHER PEOPLE: NOT DIFFICULT AT ALL
5. POOR APPETITE OR OVEREATING: NOT AT ALL
4. FEELING TIRED OR HAVING LITTLE ENERGY: NOT AT ALL
9. THOUGHTS THAT YOU WOULD BE BETTER OFF DEAD, OR OF HURTING YOURSELF: NOT AT ALL
SUM OF ALL RESPONSES TO PHQ QUESTIONS 1-9: 0
SUM OF ALL RESPONSES TO PHQ QUESTIONS 1-9: 0
3. TROUBLE FALLING OR STAYING ASLEEP: NOT AT ALL
7. TROUBLE CONCENTRATING ON THINGS, SUCH AS READING THE NEWSPAPER OR WATCHING TELEVISION: NOT AT ALL
SUM OF ALL RESPONSES TO PHQ QUESTIONS 1-9: 0
8. MOVING OR SPEAKING SO SLOWLY THAT OTHER PEOPLE COULD HAVE NOTICED. OR THE OPPOSITE, BEING SO FIGETY OR RESTLESS THAT YOU HAVE BEEN MOVING AROUND A LOT MORE THAN USUAL: NOT AT ALL
SUM OF ALL RESPONSES TO PHQ QUESTIONS 1-9: 0

## 2025-04-11 NOTE — PROGRESS NOTES
Virtual Visit Progress Note    she is a 44 y.o. year old female who presents for evaluation Follow-up        Assessment/ Plan:     Assessment & Plan  1. Attention deficit hyperactivity disorder (ADHD).  - The patient reports worsening fatigue, fogginess, and difficulty focusing, which has been affecting her daily activities and new job performance.  - Neuropsychological testing confirmed adult ADHD, contributing to depression and anxiety.  - Discussed behavioral therapy options, including working with a therapist or self-guided methods, and recommended Dr. Carranza's books on ADHD management.  - Prescribed Adderall 5 mg, with instructions to start at a low dose and monitor for side effects such as increased blood pressure, palpitations, and insomnia. Advised to continue Lexapro for the short term and maintain a consistent pharmacy at [facility] on [address].    Follow-up  The patient will follow up in 2 to 4 weeks via [employer] virtual visit.       1. Attention deficit hyperactivity disorder (ADHD), predominantly inattentive type  -     amphetamine-dextroamphetamine (ADDERALL XR) 10 MG extended release capsule; Take 1 capsule by mouth daily for 30 days. Max Daily Amount: 10 mg, Disp-30 capsule, R-0Normal  2. Major depressive disorder, single episode, moderate (HCC)         Return for follow-up ADHD 2-4 weeks virtual visit.      I have discussed the diagnosis with the patient and the intended plan as seen in the above orders.    Medication Side Effects and Warnings were discussed with patient  The patient was instructed to access after-visit summary via [employer] and questions were answered concerning future plans.    Patient conveyed understanding of plan.       Current Outpatient Medications   Medication Sig    amphetamine-dextroamphetamine (ADDERALL XR) 10 MG extended release capsule Take 1 capsule by mouth daily for 30 days. Max Daily Amount: 10 mg    terconazole (TERAZOL 7) 0.4 % vaginal cream Place vaginally nightly

## 2025-04-11 NOTE — PROGRESS NOTES
The patient (or guardian, if applicable) and other individuals in attendance with the patient were advised that Artificial Intelligence will be utilized during this visit to record, process the conversation to generate a clinical note, and support improvement of the AI technology. The patient (or guardian, if applicable) and other individuals in attendance at the appointment consented to the use of AI, including the recording.      Patient confirms they are located here in [place] for this virtual visit today.    Vonnie Mackenzie is a 44 y.o. female , id x 2(name and ). Reviewed record, history, and  medications.    Chief Complaint   Patient presents with    Follow-up        Health Maintenance Due   Topic    Hepatitis C screen     Hepatitis B vaccine (1 of 3 - 19+ 3-dose series)    DTaP/Tdap/Td vaccine (1 - Tdap)    Varicella vaccine (2 of 2 - 13+ 2-dose series)    COVID-19 Vaccine (3 - 2024- season)       Wt Readings from Last 1 Encounters:   25 88.5 kg (195 lb)     BP Readings from Last 3 Encounters:   25 130/83   24 (!) 141/82   24 136/86     Pulse Readings from Last 1 Encounters:   25 92       Surgery within the next month: no    Forms for completion: no    Chest pain/SOB: no        Depression Screening:  :     Depression: Not at risk (2024)    PHQ-2     PHQ-2 Score: 0          Coordination of Care Questionnaire:  :     \"Have you been to the ER, urgent care clinic since your last visit?  Hospitalized since your last visit?\"    NO    “Have you seen or consulted any other health care providers outside of [facility] since your last visit?”    NO        Click Here for Release of Records Request      --Tran Vásquez CMA       ------------------------------------------------

## 2025-04-11 NOTE — PATIENT INSTRUCTIONS
Instructions to find a therapist:  1.  Talk to insurance or look online to find in-network therapy options.  2.  Find out insurance out of network reimbursement.  Look into options: [employer], there are lots of text and video/phone therapy options online as well.  3.  Options for counseling, usually short term, through your employer and HR.  4.  [facility] mental health resources.

## 2025-04-14 ENCOUNTER — OFFICE VISIT (OUTPATIENT)
Age: 45
End: 2025-04-14
Payer: COMMERCIAL

## 2025-04-14 ENCOUNTER — TELEPHONE (OUTPATIENT)
Facility: CLINIC | Age: 45
End: 2025-04-14

## 2025-04-14 VITALS
DIASTOLIC BLOOD PRESSURE: 94 MMHG | BODY MASS INDEX: 31.99 KG/M2 | HEIGHT: 65 IN | WEIGHT: 192 LBS | HEART RATE: 93 BPM | SYSTOLIC BLOOD PRESSURE: 163 MMHG | OXYGEN SATURATION: 96 %

## 2025-04-14 DIAGNOSIS — D21.9 FIBROID: Primary | ICD-10-CM

## 2025-04-14 DIAGNOSIS — R53.83 OTHER FATIGUE: ICD-10-CM

## 2025-04-14 DIAGNOSIS — B00.9 HSV INFECTION: ICD-10-CM

## 2025-04-14 PROCEDURE — G8427 DOCREV CUR MEDS BY ELIG CLIN: HCPCS | Performed by: OBSTETRICS & GYNECOLOGY

## 2025-04-14 PROCEDURE — 99214 OFFICE O/P EST MOD 30 MIN: CPT | Performed by: OBSTETRICS & GYNECOLOGY

## 2025-04-14 PROCEDURE — 3080F DIAST BP >= 90 MM HG: CPT | Performed by: OBSTETRICS & GYNECOLOGY

## 2025-04-14 PROCEDURE — 3077F SYST BP >= 140 MM HG: CPT | Performed by: OBSTETRICS & GYNECOLOGY

## 2025-04-14 PROCEDURE — 4004F PT TOBACCO SCREEN RCVD TLK: CPT | Performed by: OBSTETRICS & GYNECOLOGY

## 2025-04-14 PROCEDURE — G8417 CALC BMI ABV UP PARAM F/U: HCPCS | Performed by: OBSTETRICS & GYNECOLOGY

## 2025-04-14 RX ORDER — VALACYCLOVIR HYDROCHLORIDE 500 MG/1
500 TABLET, FILM COATED ORAL DAILY
Qty: 90 TABLET | Refills: 3 | Status: SHIPPED | OUTPATIENT
Start: 2025-04-14

## 2025-04-14 RX ORDER — CYANOCOBALAMIN 1000 UG/ML
1000 INJECTION, SOLUTION INTRAMUSCULAR; SUBCUTANEOUS
Qty: 1 ML | Refills: 5 | Status: SHIPPED | OUTPATIENT
Start: 2025-04-14

## 2025-04-14 RX ORDER — MULTIVIT WITH MINERALS/LUTEIN
400 TABLET ORAL 2 TIMES DAILY
Qty: 30 CAPSULE | Refills: 3 | Status: SHIPPED | OUTPATIENT
Start: 2025-04-14

## 2025-04-29 NOTE — PROGRESS NOTES
Vonnie Mackenzie is a 44 y.o. female, , No LMP recorded. Patient has had an injection., who presents today for the following:  Chief Complaint   Patient presents with   • Follow-up     Discuss ultrasound and labs.        Allergies   Allergen Reactions   • Apple Flavoring Agent (Non-Screening) Other (See Comments)   • Nitrofurantoin Diarrhea   • Penicillins Other (See Comments)     Pt reported \"sick\".  14 0738 \"ok with ancef/keflex\"       Current Outpatient Medications   Medication Sig Dispense Refill   • cyanocobalamin 1000 MCG/ML injection Inject 1 mL into the muscle every 30 days 1 mL 5   • vitamin E 400 UNIT capsule Take 1 capsule by mouth 2 times daily 30 capsule 3   • vitamin D (CHOLECALCIFEROL) 25 MCG (1000 UT) TABS tablet Take 1 tablet by mouth daily 90 tablet 1   • valACYclovir (VALTREX) 500 MG tablet Take 1 tablet by mouth daily 90 tablet 3   • amphetamine-dextroamphetamine (ADDERALL XR) 10 MG extended release capsule Take 1 capsule by mouth daily for 30 days. Max Daily Amount: 10 mg 30 capsule 0   • terconazole (TERAZOL 7) 0.4 % vaginal cream Place vaginally nightly for 7 nights 45 g 0   • butalbital-acetaminophen-caffeine (FIORICET, ESGIC) -40 MG per tablet Take 1 tablet by mouth every 6 hours as needed for Migraine 180 tablet 0   • medroxyPROGESTERone (DEPO-PROVERA) 150 MG/ML injection Inject 1 mL into the muscle every 3 months 1 mL 4   • ondansetron (ZOFRAN-ODT) 4 MG disintegrating tablet Take 1 tablet by mouth 3 times daily as needed for Nausea or Vomiting 60 tablet 0   • Multiple Vitamin (MULTIVITAMIN ADULT PO) Take 1 tablet by mouth daily     • escitalopram (LEXAPRO) 20 MG tablet TAKE ONE TABLET BY MOUTH EVERY DAY 90 tablet 0   • amLODIPine (NORVASC) 10 MG tablet TAKE ONE TABLET BY MOUTH EVERY DAY 90 tablet 0     No current facility-administered medications for this visit.         Past Medical History:   Diagnosis Date   • Abnormal mammogram 2023    incomplete - left side   •

## 2025-05-09 ENCOUNTER — OFFICE VISIT (OUTPATIENT)
Age: 45
End: 2025-05-09
Payer: COMMERCIAL

## 2025-05-09 VITALS
OXYGEN SATURATION: 98 % | HEART RATE: 94 BPM | BODY MASS INDEX: 31.79 KG/M2 | RESPIRATION RATE: 20 BRPM | DIASTOLIC BLOOD PRESSURE: 85 MMHG | TEMPERATURE: 97.2 F | SYSTOLIC BLOOD PRESSURE: 136 MMHG | HEIGHT: 65 IN | WEIGHT: 190.8 LBS

## 2025-05-09 DIAGNOSIS — G43.E09 CHRONIC MIGRAINE WITH AURA WITHOUT STATUS MIGRAINOSUS, NOT INTRACTABLE: ICD-10-CM

## 2025-05-09 DIAGNOSIS — R41.840 ATTENTION OR CONCENTRATION DEFICIT: ICD-10-CM

## 2025-05-09 DIAGNOSIS — R53.83 OTHER FATIGUE: ICD-10-CM

## 2025-05-09 DIAGNOSIS — Z00.00 ANNUAL PHYSICAL EXAM: Primary | ICD-10-CM

## 2025-05-09 DIAGNOSIS — F90.0 ATTENTION DEFICIT HYPERACTIVITY DISORDER (ADHD), PREDOMINANTLY INATTENTIVE TYPE: ICD-10-CM

## 2025-05-09 PROCEDURE — 99204 OFFICE O/P NEW MOD 45 MIN: CPT | Performed by: STUDENT IN AN ORGANIZED HEALTH CARE EDUCATION/TRAINING PROGRAM

## 2025-05-09 PROCEDURE — 3079F DIAST BP 80-89 MM HG: CPT | Performed by: STUDENT IN AN ORGANIZED HEALTH CARE EDUCATION/TRAINING PROGRAM

## 2025-05-09 PROCEDURE — 3075F SYST BP GE 130 - 139MM HG: CPT | Performed by: STUDENT IN AN ORGANIZED HEALTH CARE EDUCATION/TRAINING PROGRAM

## 2025-05-09 PROCEDURE — G8417 CALC BMI ABV UP PARAM F/U: HCPCS | Performed by: STUDENT IN AN ORGANIZED HEALTH CARE EDUCATION/TRAINING PROGRAM

## 2025-05-09 PROCEDURE — G8427 DOCREV CUR MEDS BY ELIG CLIN: HCPCS | Performed by: STUDENT IN AN ORGANIZED HEALTH CARE EDUCATION/TRAINING PROGRAM

## 2025-05-09 PROCEDURE — 4004F PT TOBACCO SCREEN RCVD TLK: CPT | Performed by: STUDENT IN AN ORGANIZED HEALTH CARE EDUCATION/TRAINING PROGRAM

## 2025-05-09 RX ORDER — BUTALBITAL, ACETAMINOPHEN AND CAFFEINE 50; 325; 40 MG/1; MG/1; MG/1
1 TABLET ORAL EVERY 6 HOURS PRN
Qty: 180 TABLET | Refills: 0 | Status: SHIPPED | OUTPATIENT
Start: 2025-05-09 | End: 2025-05-13 | Stop reason: SDUPTHER

## 2025-05-09 RX ORDER — DEXTROAMPHETAMINE SACCHARATE, AMPHETAMINE ASPARTATE MONOHYDRATE, DEXTROAMPHETAMINE SULFATE AND AMPHETAMINE SULFATE 5; 5; 5; 5 MG/1; MG/1; MG/1; MG/1
20 CAPSULE, EXTENDED RELEASE ORAL DAILY
Qty: 30 CAPSULE | Refills: 0 | Status: SHIPPED | OUTPATIENT
Start: 2025-05-09 | End: 2025-06-08

## 2025-05-09 RX ORDER — DEXTROAMPHETAMINE SACCHARATE, AMPHETAMINE ASPARTATE MONOHYDRATE, DEXTROAMPHETAMINE SULFATE AND AMPHETAMINE SULFATE 2.5; 2.5; 2.5; 2.5 MG/1; MG/1; MG/1; MG/1
10 CAPSULE, EXTENDED RELEASE ORAL DAILY
Qty: 30 CAPSULE | Refills: 0 | Status: SHIPPED | OUTPATIENT
Start: 2025-05-09 | End: 2025-06-08

## 2025-05-09 RX ORDER — BUTALBITAL, ACETAMINOPHEN AND CAFFEINE 50; 325; 40 MG/1; MG/1; MG/1
1 TABLET ORAL EVERY 6 HOURS PRN
Qty: 180 TABLET | Refills: 0 | Status: CANCELLED | OUTPATIENT
Start: 2025-05-09

## 2025-05-09 NOTE — PROGRESS NOTES
Vonnie Mackenzie is a 44 y.o. female who presents to establish care. Patient was previously followed by outside provider.     Vonnie Mackenzie main concerns today are as follows:  Chief Complaint   Patient presents with    New Patient     Delightful 44 year old woman who presents to establish care. Current concerns:     Chronic fatigue: onset about 2 years ago since moving into her [place] home. She works full time, is in an [employer] program and also takes care of her son. She was recently diagnosed with ADHD and was started on adderall. She says she feels like it helps her focus but not her fatigue. She is currently on 10mg ER and feels like around 5 PM she has to take a nap or she cannot function. She is also on lexapro which she was started on a while ago for anxiety but does not feel like it is helping. She has been taking it sporadically for the past few months.   HTN: she is taking amlodipine 10mg although admits she also takes this sporadically. When she does take it she takes it at night. Did not take it last night and today  is she relatively normotensive.   She is on depo shot every 6 months for birth control. Tolerating this well.     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

## 2025-05-09 NOTE — PATIENT INSTRUCTIONS
Increase adderall to 20 mg daily for 1 week if still having fatigue around 5 pm on this dose add a 10mg dose around noon.     Stop Lexapro.    Have the house checked for mold and clean out duct system. Get air purifier.     Senna and miralax for constipation

## 2025-05-09 NOTE — PROGRESS NOTES
I have reviewed all needed documentation in preparation for visit. Verified patient by name and date of birth  Chief Complaint   Patient presents with    New Patient       Vitals:    05/09/25 1504   BP: 136/85   BP Site: Right Upper Arm   Patient Position: Sitting   BP Cuff Size: Medium Adult   Pulse: 94   Resp: 20   Temp: 97.2 °F (36.2 °C)   TempSrc: Temporal   SpO2: 98%   Weight: 86.5 kg (190 lb 12.8 oz)   Height: 1.651 m (5' 5\")       Health Maintenance Due   Topic Date Due    Hepatitis C screen  Never done    Hepatitis B vaccine (1 of 3 - 19+ 3-dose series) Never done    DTaP/Tdap/Td vaccine (1 - Tdap) Never done    Varicella vaccine (2 of 2 - 13+ 2-dose series) 12/05/2010    COVID-19 Vaccine (3 - 2024-25 season) 09/01/2024     \"Have you been to the ER, urgent care clinic since your last visit?  Hospitalized since your last visit?\"    NO    “Have you seen or consulted any other health care providers outside of [facility] since your last visit?”    NO            Click Here for Release of Records Request         YAMILETH Schultz

## 2025-05-12 ASSESSMENT — ENCOUNTER SYMPTOMS
ABDOMINAL PAIN: 0
NAUSEA: 0
WHEEZING: 0
DIARRHEA: 0
VOMITING: 0
SINUS PAIN: 0
SHORTNESS OF BREATH: 0
COUGH: 0
EYE REDNESS: 0
CONSTIPATION: 0

## 2025-05-13 ENCOUNTER — TELEPHONE (OUTPATIENT)
Age: 45
End: 2025-05-13

## 2025-05-13 DIAGNOSIS — G43.E09 CHRONIC MIGRAINE WITH AURA WITHOUT STATUS MIGRAINOSUS, NOT INTRACTABLE: Primary | ICD-10-CM

## 2025-05-13 RX ORDER — METHYLPREDNISOLONE 4 MG/1
TABLET ORAL
Qty: 1 KIT | Refills: 0 | Status: SHIPPED | OUTPATIENT
Start: 2025-05-13

## 2025-05-13 RX ORDER — BUTALBITAL, ACETAMINOPHEN AND CAFFEINE 50; 325; 40 MG/1; MG/1; MG/1
1 TABLET ORAL EVERY 6 HOURS PRN
Qty: 180 TABLET | Refills: 0 | Status: SHIPPED | OUTPATIENT
Start: 2025-05-13

## 2025-05-14 NOTE — TELEPHONE ENCOUNTER
Patient having type B symptoms to include nausea, headache, chills of extremities and fatigue x 1 day at this severity but has feel unwell for the past few days. Discontinued Lexapro 1 week ago. Prior to that was taking intermittently. Unsure if has fevers.   Discussed her symptoms over the phone. More likely viral etiology however unknown pathogen, recommended supportive care, fioricet for headaches and medrol dose pack to alleviate symptoms.

## 2025-05-16 ENCOUNTER — HOSPITAL ENCOUNTER (EMERGENCY)
Facility: HOSPITAL | Age: 45
Discharge: HOME | End: 2025-05-16
Attending: STUDENT IN AN ORGANIZED HEALTH CARE EDUCATION/TRAINING PROGRAM
Payer: COMMERCIAL

## 2025-05-16 ENCOUNTER — APPOINTMENT (OUTPATIENT)
Facility: HOSPITAL | Age: 45
End: 2025-05-16
Payer: COMMERCIAL

## 2025-05-16 VITALS
RESPIRATION RATE: 18 BRPM | BODY MASS INDEX: 31.65 KG/M2 | DIASTOLIC BLOOD PRESSURE: 96 MMHG | TEMPERATURE: 98.1 F | SYSTOLIC BLOOD PRESSURE: 149 MMHG | HEART RATE: 87 BPM | OXYGEN SATURATION: 99 % | WEIGHT: 190 LBS | HEIGHT: 65 IN

## 2025-05-16 DIAGNOSIS — J06.9 UPPER RESPIRATORY TRACT INFECTION, UNSPECIFIED TYPE: Primary | ICD-10-CM

## 2025-05-16 DIAGNOSIS — R06.02 SOB (SHORTNESS OF BREATH): Primary | ICD-10-CM

## 2025-05-16 DIAGNOSIS — R11.0 NAUSEA: ICD-10-CM

## 2025-05-16 LAB
FLUAV RNA SPEC QL NAA+PROBE: NOT DETECTED
FLUBV RNA SPEC QL NAA+PROBE: NOT DETECTED
SARS-COV-2 RNA RESP QL NAA+PROBE: NOT DETECTED
SOURCE: NORMAL

## 2025-05-16 PROCEDURE — 71046 X-RAY EXAM CHEST 2 VIEWS: CPT

## 2025-05-16 PROCEDURE — 87636 SARSCOV2 & INF A&B AMP PRB: CPT

## 2025-05-16 PROCEDURE — 6370000000 HC RX 637 (ALT 250 FOR IP): Performed by: STUDENT IN AN ORGANIZED HEALTH CARE EDUCATION/TRAINING PROGRAM

## 2025-05-16 PROCEDURE — 99284 EMERGENCY DEPT VISIT MOD MDM: CPT

## 2025-05-16 RX ORDER — ACETAMINOPHEN 325 MG/1
650 TABLET ORAL
Status: COMPLETED | OUTPATIENT
Start: 2025-05-16 | End: 2025-05-16

## 2025-05-16 RX ORDER — ONDANSETRON 4 MG/1
4 TABLET, FILM COATED ORAL 3 TIMES DAILY PRN
Qty: 15 TABLET | Refills: 0 | Status: SHIPPED | OUTPATIENT
Start: 2025-05-16

## 2025-05-16 RX ORDER — ONDANSETRON 4 MG/1
4 TABLET, ORALLY DISINTEGRATING ORAL ONCE
Status: COMPLETED | OUTPATIENT
Start: 2025-05-16 | End: 2025-05-16

## 2025-05-16 RX ADMIN — ACETAMINOPHEN 650 MG: 325 TABLET ORAL at 18:38

## 2025-05-16 RX ADMIN — ONDANSETRON 4 MG: 4 TABLET, ORALLY DISINTEGRATING ORAL at 18:38

## 2025-05-16 ASSESSMENT — ENCOUNTER SYMPTOMS
NAUSEA: 1
COUGH: 1
VOMITING: 0

## 2025-05-16 ASSESSMENT — PAIN - FUNCTIONAL ASSESSMENT: PAIN_FUNCTIONAL_ASSESSMENT: 0-10

## 2025-05-16 NOTE — ED TRIAGE NOTES
Pt c/o nausea and cold symptoms x 1 week. Pt denies OTC meds PTA.  Patient arrives to ED ambulatory w/o difficulty. No acute distress noted in triage. A&O x 4. Skin is warm, dry & intact on obs.

## 2025-05-16 NOTE — DISCHARGE INSTRUCTIONS
Drink plenty fluids stay well-hydrated.  Follow-up with your primary care doctor.  Return to the ER for any new or worsening symptoms.
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
## 2025-05-16 NOTE — ED NOTES
Pt discharged to home in [place], rx x 1 sent, states understanding of dc instructions and followup, pain 2/10, vss

## 2025-05-19 ENCOUNTER — HOSPITAL ENCOUNTER (OUTPATIENT)
Facility: HOSPITAL | Age: 45
Discharge: HOME | End: 2025-05-22

## 2025-05-19 DIAGNOSIS — R53.83 OTHER FATIGUE: ICD-10-CM

## 2025-05-19 DIAGNOSIS — Z00.00 ANNUAL PHYSICAL EXAM: ICD-10-CM

## 2025-05-20 LAB
25(OH)D3 SERPL-MCNC: 23 NG/ML (ref 30–100)
ALBUMIN SERPL-MCNC: 3.3 G/DL (ref 3.5–5)
ALBUMIN/GLOB SERPL: 0.9 (ref 1.1–2.2)
ALP SERPL-CCNC: 146 U/L (ref 45–117)
ALT SERPL-CCNC: 23 U/L (ref 12–78)
ANION GAP SERPL CALC-SCNC: 5 MMOL/L (ref 2–12)
AST SERPL-CCNC: 13 U/L (ref 15–37)
BASOPHILS # BLD: 0.05 K/UL (ref 0–0.1)
BASOPHILS NFR BLD: 0.5 % (ref 0–1)
BILIRUB SERPL-MCNC: 0.2 MG/DL (ref 0.2–1)
BUN SERPL-MCNC: 15 MG/DL (ref 6–20)
BUN/CREAT SERPL: 17 (ref 12–20)
CALCIUM SERPL-MCNC: 9.1 MG/DL (ref 8.5–10.1)
CHLORIDE SERPL-SCNC: 107 MMOL/L (ref 97–108)
CHOLEST SERPL-MCNC: 178 MG/DL
CO2 SERPL-SCNC: 26 MMOL/L (ref 21–32)
CREAT SERPL-MCNC: 0.88 MG/DL (ref 0.55–1.02)
DIFFERENTIAL METHOD BLD: NORMAL
EOSINOPHIL # BLD: 0.06 K/UL (ref 0–0.4)
EOSINOPHIL NFR BLD: 0.6 % (ref 0–7)
ERYTHROCYTE [DISTWIDTH] IN BLOOD BY AUTOMATED COUNT: 12.4 % (ref 11.5–14.5)
EST. AVERAGE GLUCOSE BLD GHB EST-MCNC: 117 MG/DL
FERRITIN SERPL-MCNC: 28 NG/ML (ref 8–252)
FOLATE SERPL-MCNC: 19.2 NG/ML (ref 5–21)
GLOBULIN SER CALC-MCNC: 3.8 G/DL (ref 2–4)
GLUCOSE SERPL-MCNC: 107 MG/DL (ref 65–100)
HBA1C MFR BLD: 5.7 % (ref 4–5.6)
HCT VFR BLD AUTO: 37.3 % (ref 35–47)
HDLC SERPL-MCNC: 40 MG/DL
HDLC SERPL: 4.5 (ref 0–5)
HGB BLD-MCNC: 12.7 G/DL (ref 11.5–16)
IMM GRANULOCYTES # BLD AUTO: 0.03 K/UL (ref 0–0.04)
IMM GRANULOCYTES NFR BLD AUTO: 0.3 % (ref 0–0.5)
IRON SATN MFR SERPL: 9 % (ref 20–50)
IRON SERPL-MCNC: 25 UG/DL (ref 35–150)
LDLC SERPL CALC-MCNC: 95.2 MG/DL (ref 0–100)
LYMPHOCYTES # BLD: 2.69 K/UL (ref 0.8–3.5)
LYMPHOCYTES NFR BLD: 27 % (ref 12–49)
MCH RBC QN AUTO: 29.3 PG (ref 26–34)
MCHC RBC AUTO-ENTMCNC: 34 G/DL (ref 30–36.5)
MCV RBC AUTO: 86.1 FL (ref 80–99)
MONOCYTES # BLD: 0.65 K/UL (ref 0–1)
MONOCYTES NFR BLD: 6.5 % (ref 5–13)
NEUTS SEG # BLD: 6.47 K/UL (ref 1.8–8)
NEUTS SEG NFR BLD: 65.1 % (ref 32–75)
NRBC # BLD: 0 K/UL (ref 0–0.01)
NRBC BLD-RTO: 0 PER 100 WBC
PLATELET # BLD AUTO: 361 K/UL (ref 150–400)
PMV BLD AUTO: 10.5 FL (ref 8.9–12.9)
POTASSIUM SERPL-SCNC: 3.8 MMOL/L (ref 3.5–5.1)
PROT SERPL-MCNC: 7.1 G/DL (ref 6.4–8.2)
RBC # BLD AUTO: 4.33 M/UL (ref 3.8–5.2)
SODIUM SERPL-SCNC: 138 MMOL/L (ref 136–145)
TIBC SERPL-MCNC: 280 UG/DL (ref 250–450)
TRIGL SERPL-MCNC: 214 MG/DL
VIT B12 SERPL-MCNC: 457 PG/ML (ref 193–986)
VLDLC SERPL CALC-MCNC: 42.8 MG/DL
WBC # BLD AUTO: 10 K/UL (ref 3.6–11)

## 2025-05-21 LAB — TSH SERPL DL<=0.05 MIU/L-ACNC: 1.43 UIU/ML (ref 0.45–4.5)

## 2025-05-27 ENCOUNTER — RESULTS FOLLOW-UP (OUTPATIENT)
Age: 45
End: 2025-05-27

## 2025-08-01 ENCOUNTER — OFFICE VISIT (OUTPATIENT)
Age: 45
End: 2025-08-01
Payer: COMMERCIAL

## 2025-08-01 VITALS
DIASTOLIC BLOOD PRESSURE: 88 MMHG | WEIGHT: 192.6 LBS | RESPIRATION RATE: 18 BRPM | BODY MASS INDEX: 32.09 KG/M2 | SYSTOLIC BLOOD PRESSURE: 132 MMHG | OXYGEN SATURATION: 97 % | HEART RATE: 100 BPM | HEIGHT: 65 IN | TEMPERATURE: 98.2 F

## 2025-08-01 DIAGNOSIS — R74.8 ALKALINE PHOSPHATASE ELEVATION: ICD-10-CM

## 2025-08-01 DIAGNOSIS — R74.8 ALKALINE PHOSPHATASE ELEVATION: Primary | ICD-10-CM

## 2025-08-01 PROCEDURE — G8427 DOCREV CUR MEDS BY ELIG CLIN: HCPCS | Performed by: STUDENT IN AN ORGANIZED HEALTH CARE EDUCATION/TRAINING PROGRAM

## 2025-08-01 PROCEDURE — 3075F SYST BP GE 130 - 139MM HG: CPT | Performed by: STUDENT IN AN ORGANIZED HEALTH CARE EDUCATION/TRAINING PROGRAM

## 2025-08-01 PROCEDURE — 99214 OFFICE O/P EST MOD 30 MIN: CPT | Performed by: STUDENT IN AN ORGANIZED HEALTH CARE EDUCATION/TRAINING PROGRAM

## 2025-08-01 PROCEDURE — G8417 CALC BMI ABV UP PARAM F/U: HCPCS | Performed by: STUDENT IN AN ORGANIZED HEALTH CARE EDUCATION/TRAINING PROGRAM

## 2025-08-01 PROCEDURE — 3079F DIAST BP 80-89 MM HG: CPT | Performed by: STUDENT IN AN ORGANIZED HEALTH CARE EDUCATION/TRAINING PROGRAM

## 2025-08-01 PROCEDURE — 4004F PT TOBACCO SCREEN RCVD TLK: CPT | Performed by: STUDENT IN AN ORGANIZED HEALTH CARE EDUCATION/TRAINING PROGRAM

## 2025-08-01 ASSESSMENT — ENCOUNTER SYMPTOMS
EYE REDNESS: 0
WHEEZING: 0
DIARRHEA: 0
SINUS PAIN: 0
CONSTIPATION: 0
NAUSEA: 0
VOMITING: 0
COUGH: 0
SHORTNESS OF BREATH: 0
ABDOMINAL PAIN: 0

## 2025-08-01 NOTE — PROGRESS NOTES
I have reviewed all needed documentation in preparation for visit. Verified patient by name and date of birth  Chief Complaint   Patient presents with    3 Month Follow-Up       Vitals:    08/01/25 1145   BP: 132/88   BP Site: Left Upper Arm   Patient Position: Sitting   BP Cuff Size: Medium Adult   Pulse: 100   Resp: 18   Temp: 98.2 °F (36.8 °C)   TempSrc: Temporal   SpO2: 97%   Weight: 87.4 kg (192 lb 9.6 oz)   Height: 1.651 m (5' 5\")       Health Maintenance Due   Topic Date Due    Hepatitis C screen  Never done    Hepatitis B vaccine (1 of 3 - 19+ 3-dose series) Never done    DTaP/Tdap/Td vaccine (1 - Tdap) Never done    Varicella vaccine (2 of 2 - 13+ 2-dose series) 12/05/2010    COVID-19 Vaccine (3 - 2024-25 season) 09/01/2024    Flu vaccine (1) 08/01/2025     \"Have you been to the ER, urgent care clinic since your last visit?  Hospitalized since your last visit?\"    YES - When: approximately 4 months ago.  Where and Why: [facility] .    “Have you seen or consulted any other health care providers outside of [facility] since your last visit?”    NO            Click Here for Release of Records Request         YAMILETH Schultz

## 2025-08-01 NOTE — PROGRESS NOTES
Vonnie Mackenzie is a 44 y.o. female  Chief Complaint   Patient presents with    3 Month Follow-Up     Follow up      HPI  Since our last visit she discontinued her adderall and amlodipine and has felt much better. Her blood pressure is normal. She does still have amlodipine which she takes when she has a headache although has not checked the blood pressure at time of headache so unclear if that is a factor. She takes amlodipine along with fioricet for migraines. Has found that excedrin works best though. Also has Zofran as needed for migraines. Has noticed worse breathing and foot pain since weight gain but is working on losing weight.   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   Her son is doing well, they spent about 4 weeks of the summer at her mom's house to help with  as Vonnie is also in school to get her CATHERINE.     ROS  Review of Systems   Constitutional:  Negative for chills and fever.   HENT:  Negative for sinus pain.    Eyes:  Negative for redness.   Respiratory:  Negative for cough, shortness of breath and wheezing.    Cardiovascular:  Negative for chest pain.   Gastrointestinal:  Negative for abdominal pain, constipation, diarrhea, nausea and vomiting.   Endocrine: Negative for polyuria.   Genitourinary:  Negative for dysuria.   Musculoskeletal:  Negative for arthralgias and myalgias.   Neurological:  Negative for light-headedness and headaches.   Psychiatric/Behavioral:  Negative for agitation and dysphoric mood. The patient is not nervous/anxious.          Vitals:    08/01/25 1145   BP: 132/88   Pulse: 100   Resp: 18   Temp: 98.2 °F (36.8 °C)   SpO2: 97%      Wt Readings from Last 3 Encounters:   08/01/25 87.4 kg (192 lb 9.6 oz)   05/16/25 86.2 kg (190 lb)   05/09/25 86.5 kg (190 lb 12.8 oz)       EXAM  Physical Exam  Vitals and nursing note reviewed.   Constitutional:       Appearance:

## 2025-08-02 LAB
ALBUMIN SERPL-MCNC: 3.7 G/DL (ref 3.5–5.2)
ALBUMIN/GLOB SERPL: 1 (ref 1.1–2.2)
ALP SERPL-CCNC: 131 U/L (ref 35–104)
ALT SERPL-CCNC: 17 U/L (ref 10–35)
AST SERPL-CCNC: 19 U/L (ref 10–35)
BILIRUB DIRECT SERPL-MCNC: 0.1 MG/DL (ref 0.1–0.3)
BILIRUB SERPL-MCNC: 0.3 MG/DL (ref 0–1.2)
GGT SERPL-CCNC: 12 U/L (ref 5–36)
GLOBULIN SER CALC-MCNC: 3.8 G/DL (ref 2–4)
PROT SERPL-MCNC: 7.4 G/DL (ref 6.4–8.3)

## 2025-08-29 DIAGNOSIS — M62.838 MUSCLE SPASM: Primary | ICD-10-CM

## 2025-08-29 RX ORDER — LIDOCAINE 50 MG/G
1 PATCH TOPICAL DAILY
Qty: 30 PATCH | Refills: 0 | Status: SHIPPED | OUTPATIENT
Start: 2025-08-29 | End: 2025-09-08

## 2025-08-29 RX ORDER — METHOCARBAMOL 750 MG/1
750 TABLET, FILM COATED ORAL 4 TIMES DAILY
Qty: 40 TABLET | Refills: 0 | Status: SHIPPED | OUTPATIENT
Start: 2025-08-29 | End: 2025-09-08